# Patient Record
Sex: MALE | Race: WHITE | Employment: OTHER | URBAN - METROPOLITAN AREA
[De-identification: names, ages, dates, MRNs, and addresses within clinical notes are randomized per-mention and may not be internally consistent; named-entity substitution may affect disease eponyms.]

---

## 2017-02-06 ENCOUNTER — ANTICOAGULATION THERAPY VISIT (OUTPATIENT)
Dept: NURSING | Facility: CLINIC | Age: 82
End: 2017-02-06
Payer: COMMERCIAL

## 2017-02-06 DIAGNOSIS — Z79.01 LONG-TERM (CURRENT) USE OF ANTICOAGULANTS: Primary | ICD-10-CM

## 2017-02-06 DIAGNOSIS — I48.91 ATRIAL FIBRILLATION, UNSPECIFIED TYPE (H): ICD-10-CM

## 2017-02-06 LAB — INR POINT OF CARE: 2 (ref 0.86–1.14)

## 2017-02-06 PROCEDURE — 85610 PROTHROMBIN TIME: CPT | Mod: QW

## 2017-02-06 PROCEDURE — 99207 ZZC NO CHARGE NURSE ONLY: CPT

## 2017-02-06 PROCEDURE — 36416 COLLJ CAPILLARY BLOOD SPEC: CPT

## 2017-02-06 NOTE — PROGRESS NOTES
ANTICOAGULATION FOLLOW-UP CLINIC VISIT    Patient Name:  Linwood Chi  Date:  2/6/2017  Contact Type:  Face to Face    SUBJECTIVE:     Patient Findings     Positives No Problem Findings           OBJECTIVE    INR PROTIME   Date Value Ref Range Status   02/06/2017 2.0* 0.86 - 1.14 Final       ASSESSMENT / PLAN  INR assessment THER    Recheck INR In: 6 WEEKS    INR Location Clinic      Anticoagulation Summary as of 2/6/2017     INR goal 2.0-3.0   Selected INR 2.0 (2/6/2017)   Maintenance plan 5 mg (5 mg x 1) on Mon; 2.5 mg (5 mg x 0.5) all other days   Full instructions 5 mg on Mon; 2.5 mg all other days   Weekly total 20 mg   No change documented Marin Iqbal RN   Plan last modified Nannette Muhammad RN (4/11/2016)   Next INR check 3/20/2017   Priority INR   Target end date Indefinite    Indications   Long-term (current) use of anticoagulants [Z79.01] [Z79.01]  Atrial fibrillation (HCC) [I48.91] [I48.91]         Anticoagulation Episode Summary     INR check location     Preferred lab     Send INR reminders to [facility]    Comments             See the Encounter Report to view Anticoagulation Flowsheet and Dosing Calendar (Go to Encounters tab in chart review, and find the Anticoagulation Therapy Visit)    RN reviewed patient's weekly warfarin dose.  Pt INR remains within therapeutic range.  Pt will continue with current dosing and monitoring as planned, based on physician directed care plan.    [facility]     Marin Iqbal RN

## 2017-02-06 NOTE — MR AVS SNAPSHOT
Linwood Chi   2/6/2017 10:45 AM   Anticoagulation Therapy Visit    Description:  86 year old male   Provider:  SILVA ANTI COAG   Department:   Nurse           INR as of 2/6/2017     Selected INR 2.0 (2/6/2017)      Anticoagulation Summary as of 2/6/2017     INR goal 2.0-3.0   Selected INR 2.0 (2/6/2017)   Full instructions 5 mg on Mon; 2.5 mg all other days   Next INR check 3/20/2017    Indications   Long-term (current) use of anticoagulants [Z79.01] [Z79.01]  Atrial fibrillation (HCC) [I48.91] [I48.91]         Your next Anticoagulation Clinic appointment(s)     Feb 06, 2017 10:45 AM   [facility] SILVA ANTI COAG   [facility]    [address] [address] [phone]            Mar 20, 2017 10:45 AM   Anticoagulation Visit with SILVA ANTI COAG   [facility] ([address] [address] [phone]              Contact Numbers     [facility]  Please call [phone] to cancel and/or reschedule your appointment   Please call [phone] with any problems or questions regarding your therapy.        February 2017 Details    Sun Mon Tue Wed Thu Fri Sat        1               2               3               4                 5               6      5 mg   See details      7      2.5 mg         8      2.5 mg         9      2.5 mg         10      2.5 mg         11      2.5 mg           12      2.5 mg         13      5 mg         14      2.5 mg         15      2.5 mg         16      2.5 mg         17      2.5 mg         18      2.5 mg           19      2.5 mg         20      5 mg         21      2.5 mg         22      2.5 mg         23      2.5 mg         24      2.5 mg         25      2.5 mg           26      2.5 mg         27      5 mg         28      2.5 mg              Date Details   02/06 This INR check               How to take your warfarin dose     To take:  2.5 mg Take  0.5 of a 5 mg tablet.    To take:  5 mg Take 1 of the 5 mg tablets.           March 2017 Details    Sun Mon Tue Wed Thu Fri Sat        1      2.5 mg         2      2.5 mg         3      2.5 mg         4      2.5 mg           5      2.5 mg         6      5 mg         7      2.5 mg         8      2.5 mg         9      2.5 mg         10      2.5 mg         11      2.5 mg           12      2.5 mg         13      5 mg         14      2.5 mg         15      2.5 mg         16      2.5 mg         17      2.5 mg         18      2.5 mg           19      2.5 mg         20            21               22               23               24               25                 26               27               28               29               30               31                 Date Details   No additional details    Date of next INR:  3/20/2017         How to take your warfarin dose     To take:  2.5 mg Take 0.5 of a 5 mg tablet.    To take:  5 mg Take 1 of the 5 mg tablets.

## 2017-03-14 DIAGNOSIS — I48.91 ATRIAL FIBRILLATION, UNSPECIFIED TYPE (H): ICD-10-CM

## 2017-03-14 DIAGNOSIS — K21.9 GASTROESOPHAGEAL REFLUX DISEASE WITHOUT ESOPHAGITIS: Primary | ICD-10-CM

## 2017-03-14 RX ORDER — WARFARIN SODIUM 5 MG/1
TABLET ORAL
Qty: 50 TABLET | Refills: 11 | Status: SHIPPED | OUTPATIENT
Start: 2017-03-14 | End: 2018-06-16

## 2017-03-14 NOTE — TELEPHONE ENCOUNTER
omeprazole (PRILOSEC) 20 MG capsule (Discontinued) 12/26/2016      Last Written Prescription Date: 3/22/2016  Last Fill Quantity: 90,  # refills: 3   Last Office Visit with [facility], [facility] or [facility] prescribing provider: 12/26/2016    warfarin (COUMADIN) 5 MG tablet    Last Written Prescription Date: 12/14/2016  Last Fill Qty: 50, # refills: 0  Last Office Visit with [facility], [facility] or [facility] prescribing provider: 12/26/2016       Date and Result of Last PT/INR:   Lab Results   Component Value Date    INR 2.0 02/06/2017    INR 2.4 12/26/2016    INR 2.8 07/14/2015    INR  06/08/2013     NO ORDERS IN SYSTEM.  DR.DANG GARCIA ON CALL PLACED ORDER AND RESULT GIVEN TO HIM   VERBALLY AT 11:20./[facility]/MM  2.70    INR 8.54 06/04/2013

## 2017-03-17 ENCOUNTER — TELEPHONE (OUTPATIENT)
Dept: NURSING | Facility: CLINIC | Age: 82
End: 2017-03-17

## 2017-03-17 DIAGNOSIS — Z79.01 LONG-TERM (CURRENT) USE OF ANTICOAGULANTS: Primary | ICD-10-CM

## 2017-03-17 DIAGNOSIS — I48.91 ATRIAL FIBRILLATION, UNSPECIFIED TYPE (H): ICD-10-CM

## 2017-03-22 ENCOUNTER — ANTICOAGULATION THERAPY VISIT (OUTPATIENT)
Dept: NURSING | Facility: CLINIC | Age: 82
End: 2017-03-22
Payer: COMMERCIAL

## 2017-03-22 DIAGNOSIS — Z79.01 LONG-TERM (CURRENT) USE OF ANTICOAGULANTS: ICD-10-CM

## 2017-03-22 LAB — INR POINT OF CARE: 2.8 (ref 0.86–1.14)

## 2017-03-22 PROCEDURE — 99207 ZZC NO CHARGE NURSE ONLY: CPT

## 2017-03-22 PROCEDURE — 36416 COLLJ CAPILLARY BLOOD SPEC: CPT

## 2017-03-22 PROCEDURE — 85610 PROTHROMBIN TIME: CPT | Mod: QW

## 2017-03-22 NOTE — PROGRESS NOTES
ANTICOAGULATION FOLLOW-UP CLINIC VISIT    Patient Name:  Linwood Chi  Date:  3/22/2017  Contact Type:  Face to Face    SUBJECTIVE:     Patient Findings     Positives No Problem Findings           OBJECTIVE    INR Protime   Date Value Ref Range Status   03/22/2017 2.8 (A) 0.86 - 1.14 Final       ASSESSMENT / PLAN  INR assessment THER    Recheck INR In: 6 WEEKS    INR Location Clinic      Anticoagulation Summary as of 3/22/2017     INR goal 2.0-3.0   Today's INR 2.8   Maintenance plan 5 mg (5 mg x 1) on Mon; 2.5 mg (5 mg x 0.5) all other days   Full instructions 5 mg on Mon; 2.5 mg all other days   Weekly total 20 mg   No change documented Layla Garcia, RN   Plan last modified Nannette Muhammad RN (4/11/2016)   Next INR check 5/3/2017   Priority INR   Target end date Indefinite    Indications   Long-term (current) use of anticoagulants [Z79.01] [Z79.01]  Atrial fibrillation (HCC) [I48.91] [I48.91]         Anticoagulation Episode Summary     INR check location     Preferred lab     Send INR reminders to [facility]    Comments             See the Encounter Report to view Anticoagulation Flowsheet and Dosing Calendar (Go to Encounters tab in chart review, and find the Anticoagulation Therapy Visit)    Dosage adjustment made based on physician directed care plan.    Layla Garcia, SAMIR

## 2017-04-17 ENCOUNTER — OFFICE VISIT (OUTPATIENT)
Dept: FAMILY MEDICINE | Facility: CLINIC | Age: 82
End: 2017-04-17
Payer: COMMERCIAL

## 2017-04-17 VITALS
DIASTOLIC BLOOD PRESSURE: 62 MMHG | WEIGHT: 190 LBS | OXYGEN SATURATION: 98 % | BODY MASS INDEX: 30.53 KG/M2 | SYSTOLIC BLOOD PRESSURE: 142 MMHG | HEIGHT: 66 IN | TEMPERATURE: 97.4 F | HEART RATE: 63 BPM

## 2017-04-17 DIAGNOSIS — K62.5 RECTAL BLEEDING: Primary | ICD-10-CM

## 2017-04-17 LAB
BASOPHILS # BLD AUTO: 0 10E9/L (ref 0–0.2)
BASOPHILS NFR BLD AUTO: 0.2 %
DIFFERENTIAL METHOD BLD: ABNORMAL
EOSINOPHIL # BLD AUTO: 0.2 10E9/L (ref 0–0.7)
EOSINOPHIL NFR BLD AUTO: 1.5 %
ERYTHROCYTE [DISTWIDTH] IN BLOOD BY AUTOMATED COUNT: 15.9 % (ref 10–15)
HCT VFR BLD AUTO: 42.6 % (ref 40–53)
HGB BLD-MCNC: 14.6 G/DL (ref 13.3–17.7)
LYMPHOCYTES # BLD AUTO: 2.7 10E9/L (ref 0.8–5.3)
LYMPHOCYTES NFR BLD AUTO: 22.3 %
MCH RBC QN AUTO: 31.2 PG (ref 26.5–33)
MCHC RBC AUTO-ENTMCNC: 34.3 G/DL (ref 31.5–36.5)
MCV RBC AUTO: 91 FL (ref 78–100)
MONOCYTES # BLD AUTO: 0.9 10E9/L (ref 0–1.3)
MONOCYTES NFR BLD AUTO: 7 %
NEUTROPHILS # BLD AUTO: 8.5 10E9/L (ref 1.6–8.3)
NEUTROPHILS NFR BLD AUTO: 69 %
PLATELET # BLD AUTO: 247 10E9/L (ref 150–450)
RBC # BLD AUTO: 4.68 10E12/L (ref 4.4–5.9)
WBC # BLD AUTO: 12.3 10E9/L (ref 4–11)

## 2017-04-17 PROCEDURE — 36415 COLL VENOUS BLD VENIPUNCTURE: CPT | Performed by: FAMILY MEDICINE

## 2017-04-17 PROCEDURE — 85025 COMPLETE CBC W/AUTO DIFF WBC: CPT | Performed by: FAMILY MEDICINE

## 2017-04-17 PROCEDURE — 99214 OFFICE O/P EST MOD 30 MIN: CPT | Performed by: FAMILY MEDICINE

## 2017-04-17 ASSESSMENT — PAIN SCALES - GENERAL: PAINLEVEL: MILD PAIN (3)

## 2017-04-17 NOTE — MR AVS SNAPSHOT
After Visit Summary   4/17/2017    Linwood Chi    MRN: [id number]           Patient Information     Date Of Birth          5/27/1930        Visit Information        Provider Department      4/17/2017 9:00 AM Bernie Amaro MD [facility]        Today's Diagnoses     Rectal bleeding    -  1      Care Instructions    [facility]    If you have any questions regarding to your visit please contact your care team:       Team Purple:   Clinic Hours Telephone Number   [address] Dr[address]   7am-7pm  Monday - Thursday   7am-5pm  Fridays  [phone]  (Appointment scheduling available 24/7)    Questions about your Visit?   Team Line:  [phone]   Urgent Care - [place] and [facility] - 11am-9pm Monday-Friday Saturday-Sunday- 9am-5pm   [place] - 5pm-9pm Monday-Friday Saturday-Sunday- 9am-5pm  [phone] - [place]   [phone] - [place]       What options do I have for visits at the clinic other than the traditional office visit?  To expand how we care for you, many of our providers are utilizing electronic visits (e-visits) and telephone visits, when medically appropriate, for interactions with their patients rather than a visit in the clinic.   We also offer nurse visits for many medical concerns. Just like any other service, we will bill your insurance company for this type of visit based on time spent on the phone with your provider. Not all insurance companies cover these visits. Please check with your medical insurance if this type of visit is covered. You will be responsible for any charges that are not paid by your insurance.      E-visits via [employer]:  generally incur a $35.00 fee.  Telephone visits:  Time spent on the phone: *charged based on time that is spent on the phone in increments of 10 minutes. Estimated cost:   5-10 mins $30.00   11-20 mins. $59.00   21-30 mins. $85.00      Use [employer] Rock[employer] (secure email communication and access to your chart) to send your primary care provider a message or make an appointment. Ask someone on your Team how to sign up for [employer].  For a Price Quote for your services, please call our Consumer Price Line at [phone].  As always, Thank you for trusting us with your health care needs!            Follow-ups after your visit        Additional Services     GASTROENTEROLOGY ADULT REF CONSULT ONLY       Preferred Location: [facility] [phone]   For acute rectal bleeding       Please be aware that coverage of these services is subject to the terms and limitations of your health insurance plan.  Call member services at your health plan with any benefit or coverage questions.  Any procedures must be performed at a [facility] facility OR coordinated by your clinic's referral office.    Please bring the following with you to your appointment:    (1) Any X-Rays, CTs or MRIs which have been performed.  Contact the facility where they were done to arrange for  prior to your scheduled appointment.    (2) List of current medications   (3) This referral request   (4) Any documents/labs given to you for this referral                  Your next 10 appointments already scheduled     May 03, 2017  9:45 AM CDT   Anticoagulation Visit with FZ ANTI COAELICEO   [facility] [place] ([facility])    [address]  [place] [address] [phone]              Who to contact     If you have questions or need follow up information about today's clinic visit or your schedule please contact [facility] directly at [phone].  Normal or non-critical lab and imaging results will be communicated to you by MyChart, letter or phone within 4 business days after the clinic has received the results. If you do not hear from us within 7 days, please contact the clinic through avocadostorehart or phone. If you have a critical or abnormal lab result, we  "will notify you by phone as soon as possible.  Submit refill requests through [employer] or call your pharmacy and they will forward the refill request to us. Please allow 3 business days for your refill to be completed.          Additional Information About Your Visit        Rabixohart Information     [employer] lets you send messages to your doctor, view your test results, renew your prescriptions, schedule appointments and more. To sign up, go to [email or web address] . Click on \"Log in\" on the left side of the screen, which will take you to the Welcome page. Then click on \"Sign up Now\" on the right side of the page.     You will be asked to enter the access code listed below, as well as some personal information. Please follow the directions to create your username and password.     Your access code is: [email or web address]  Expires: 2017  9:34 AM     Your access code will  in 90 days. If you need help or a new code, please call your [facility] clinic or [phone].        Care EveryWhere ID     This is your Care EveryWhere ID. This could be used by other organizations to access your [facility] medical records  [id number]        Your Vitals Were     Pulse Temperature Height Pulse Oximetry BMI (Body Mass Index)       63 97.4  F (36.3  C) (Oral) 5' 6\" (1.676 m) 98% 30.67 kg/m2        Blood Pressure from Last 3 Encounters:   17 142/62   16 138/80   16 123/72    Weight from Last 3 Encounters:   17 190 lb (86.2 kg)   16 189 lb 8 oz (86 kg)   16 189 lb (85.7 kg)              We Performed the Following     CBC with platelets differential     GASTROENTEROLOGY ADULT REF CONSULT ONLY        Primary Care Provider Office Phone # Fax #    Bernie Amaro [phone]       [address] [address]        Thank you!     Thank you for choosing [facility]  for your care. Our goal is always to provide you with " excellent care. Hearing back from our patients is one way we can continue to improve our services. Please take a few minutes to complete the written survey that you may receive in the mail after your visit with us. Thank you!             Your Updated Medication List - Protect others around you: Learn how to safely use, store and throw away your medicines at www.disposemymeds.org.          This list is accurate as of: 4/17/17  9:34 AM.  Always use your most recent med list.                   Brand Name Dispense Instructions for use    amLODIPine 5 MG tablet    NORVASC    90 tablet    Take 1 tablet (5 mg) by mouth daily       aspirin 81 MG tablet      Take 1 tablet by mouth daily.       atorvastatin 40 MG tablet    LIPITOR    90 tablet    Take 1 tablet (40 mg) by mouth daily       dorzolamide-timolol 2-0.5 % ophthalmic solution    COSOPT     Place 1 drop Into the left eye 2 times daily       latanoprost 0.005 % ophthalmic solution    XALATAN     Place 1 drop Into the left eye daily       lisinopril 20 MG tablet    PRINIVIL/ZESTRIL    90 tablet    Take 1 tablet (20 mg) by mouth daily       metFORMIN 500 MG tablet    GLUCOPHAGE    180 tablet    TAKE 1 TABLET BY MOUTH TWICE DAILY WITH MEALS       metoprolol 25 MG tablet    LOPRESSOR    90 tablet    Take one a day       omeprazole 20 MG CR capsule    priLOSEC    90 capsule    TAKE 1 CAPSULE BY MOUTH EVERY DAY 30 TO 60 MINUTES BEFORE A MEAL       order for DME     1 each    Equipment being ordered: Mild compression hose       warfarin 5 MG tablet    COUMADIN    50 tablet    TAKE ONE-HALF TABLET BY MOUTH ON DAILY EXCEPT TAKE 1 TABLET DAILY ON MONDAYS

## 2017-04-17 NOTE — NURSING NOTE
"Chief Complaint   Patient presents with     Rectal Problem     blood in stool       Initial /62  Pulse 63  Temp 97.4  F (36.3  C) (Oral)  Ht 5' 6\" (1.676 m)  Wt 190 lb (86.2 kg)  SpO2 98%  BMI 30.67 kg/m2 Estimated body mass index is 30.67 kg/(m^2) as calculated from the following:    Height as of this encounter: 5' 6\" (1.676 m).    Weight as of this encounter: 190 lb (86.2 kg).  Medication Reconciliation: complete   Malissa Li CMA    "

## 2017-04-17 NOTE — LETTER
[address] [place] [address]    April 17, 2017    Linwood Chi  [address] [address]          Dear Donnie Palumbo is a copy of your results.   Your lab tests are looking good. The colonoscopy will give more answers as to why you are having rectal bleeding.    Results for orders placed or performed in visit on 04/17/17   CBC with platelets differential   Result Value Ref Range    WBC 12.3 (H) 4.0 - 11.0 10e9/L    RBC Count 4.68 4.4 - 5.9 10e12/L    Hemoglobin 14.6 13.3 - 17.7 g/dL    Hematocrit 42.6 40.0 - 53.0 %    MCV 91 78 - 100 fl    MCH 31.2 26.5 - 33.0 pg    MCHC 34.3 31.5 - 36.5 g/dL    RDW 15.9 (H) 10.0 - 15.0 %    Platelet Count 247 150 - 450 10e9/L    Diff Method Automated Method     % Neutrophils 69.0 %    % Lymphocytes 22.3 %    % Monocytes 7.0 %    % Eosinophils 1.5 %    % Basophils 0.2 %    Absolute Neutrophil 8.5 (H) 1.6 - 8.3 10e9/L    Absolute Lymphocytes 2.7 0.8 - 5.3 10e9/L    Absolute Monocytes 0.9 0.0 - 1.3 10e9/L    Absolute Eosinophils 0.2 0.0 - 0.7 10e9/L    Absolute Basophils 0.0 0.0 - 0.2 10e9/L       If you have any questions or concerns, please call myself or my nurse at [phone].      Sincerely,        Bernie Amaro MD   /

## 2017-04-17 NOTE — PATIENT INSTRUCTIONS
[facility]    If you have any questions regarding to your visit please contact your care team:       Team Purple:   Clinic Hours Telephone Number   [address] Dr[address]   7am-7pm  Monday - Thursday   7am-5pm  Fridays  [phone]  (Appointment scheduling available 24/7)    Questions about your Visit?   Team Line:  [phone]   Urgent Care - [place] and [facility] - 11am-9pm Monday-Friday Saturday-Sunday- 9am-5pm   [place] - 5pm-9pm Monday-Friday Saturday-Sunday- 9am-5pm  [phone] - [facility]  [phone] - [place]       What options do I have for visits at the clinic other than the traditional office visit?  To expand how we care for you, many of our providers are utilizing electronic visits (e-visits) and telephone visits, when medically appropriate, for interactions with their patients rather than a visit in the clinic.   We also offer nurse visits for many medical concerns. Just like any other service, we will bill your insurance company for this type of visit based on time spent on the phone with your provider. Not all insurance companies cover these visits. Please check with your medical insurance if this type of visit is covered. You will be responsible for any charges that are not paid by your insurance.      E-visits via [employer]:  generally incur a $35.00 fee.  Telephone visits:  Time spent on the phone: *charged based on time that is spent on the phone in increments of 10 minutes. Estimated cost:   5-10 mins $30.00   11-20 mins. $59.00   21-30 mins. $85.00     Use echoechot (secure email communication and access to your chart) to send your primary care provider a message or make an appointment. Ask someone on your Team how to sign up for [employer].  For a Price Quote for your services, please call our Consumer Price Line at [phone].  As always, Thank you for trusting us with your health care needs!

## 2017-04-17 NOTE — PROGRESS NOTES
SUBJECTIVE:                                                    Linwood Chi is a 86 year old male who presents to clinic today for the following health issues:      Blood in stool      Duration: 14 hours    Description (location/character/radiation): blood in stool 6 times yesterday, patient states he passed gas and blood came out.    Intensity:  moderate    Accompanying signs and symptoms: abdominal pain    History (similar episodes/previous evaluation): Patient states he had a tear in his colon in the past and needed a blood transfusion (he states 5 pints of blood). Patient is now on warfarin    Precipitating or alleviating factors: Patient didn't take his warfarin and aspirin today    Therapies tried and outcome: None            Problem list and histories reviewed & adjusted, as indicated.  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     DM (diabetes mellitus), type 2 with renal complications (H)     A-fib (H)     Osteoarthritis of left knee     Onychomycosis     Long-term (current) use of anticoagulants [Z79.01]     Atrial fibrillation (HCC) [I48.91]     Type 2 diabetes mellitus with diabetic nephropathy (H)     Benign essential hypertension     Gastroesophageal reflux disease without esophagitis     Past Surgical History:   Procedure Laterality Date     BYPASS GRAFT ARTERY CORONARY  2004    3 vessel 2004     OPEN REDUCTION INTERNAL FIXATION PATELLA         Social History   Substance Use Topics     Smoking status: Former Smoker     Years: 50.00     Types: Cigarettes     Quit date: 3/4/1992     Smokeless tobacco: Former User      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     Family History   Problem Relation Age of Onset     Asthma Father      DIABETES Paternal Grandmother      CEREBROVASCULAR DISEASE Brother      Hypertension Brother      DIABETES Sister      Psychotic Disorder Sister          Current Outpatient Prescriptions   Medication Sig Dispense Refill     omeprazole (PRILOSEC) 20 MG CR capsule TAKE 1 CAPSULE BY MOUTH EVERY DAY 30 TO 60 MINUTES BEFORE A MEAL 90 capsule 3     warfarin (COUMADIN) 5 MG tablet TAKE ONE-HALF TABLET BY MOUTH ON DAILY EXCEPT TAKE 1 TABLET DAILY ON MONDAYS 50 tablet 11     metoprolol (LOPRESSOR) 25 MG tablet Take one a day 90 tablet 4     atorvastatin (LIPITOR) 40 MG tablet Take 1 tablet (40 mg) by mouth daily 90 tablet 4     lisinopril (PRINIVIL/ZESTRIL) 20 MG tablet Take 1 tablet (20 mg) by mouth daily 90 tablet 4     amLODIPine (NORVASC) 5 MG tablet Take 1 tablet (5 mg) by mouth daily 90 tablet 4     metFORMIN (GLUCOPHAGE) 500 MG tablet TAKE 1 TABLET BY MOUTH TWICE DAILY WITH MEALS 180 tablet 4     dorzolamide-timolol (COSOPT) 2-0.5 % ophthalmic solution Place 1 drop Into the left eye 2 times daily  6     latanoprost (XALATAN) 0.005 % ophthalmic solution Place 1 drop Into the left eye daily  3     order for DME Equipment being ordered: Mild compression hose 1 each 0     aspirin 81 MG tablet Take 1 tablet by mouth daily.  3     Allergies   Allergen Reactions     Crestor [Rosuvastatin] Cramps     Pcn [Bicillin C-R,]      Swelling      BP Readings from Last 3 Encounters:   04/17/17 142/62   12/26/16 138/80   07/22/16 123/72    Wt Readings from Last 3 Encounters:   04/17/17 190 lb (86.2 kg)   12/26/16 189 lb 8 oz (86 kg)   07/22/16 189 lb (85.7 kg)                  Labs reviewed in EPIC    Reviewed and updated as needed this visit by clinical staff       Reviewed and updated as needed this visit by Provider         ROS:  This 86 year old male is here today because he was invited to a neighbor's house  "yesterday after [employer] for [employer] dinner. Once he got home, he had about 6-7 loose bowel movements and they were bloody at the end. He just had another bloody bowel movement prior to this appointment. Has last colonoscopy was 6 years ago, but his prep was not very good. He will need another colonoscopy. His INR was 2.8 on 3/22/17.  All other review of systems are negative  Personal, family, and social history reviewed with patient and revised.         OBJECTIVE:                                                    /62  Pulse 63  Temp 97.4  F (36.3  C) (Oral)  Ht 5' 6\" (1.676 m)  Wt 190 lb (86.2 kg)  SpO2 98%  BMI 30.67 kg/m2  Body mass index is 30.67 kg/(m^2).  GENERAL: healthy, alert and no distress, but he is anxious and appears a little pale   NECK: no adenopathy, no asymmetry, masses, or scars and thyroid normal to palpation  RESP: lungs clear to auscultation - no rales, rhonchi or wheezes  CV: regular rate and rhythm, normal S1 S2, no S3 or S4, no murmur, click or rub, no peripheral edema and peripheral pulses strong  ABDOMEN: soft, nontender, no hepatosplenomegaly, no masses and bowel sounds normal  No shortness of breath with walking.   Rectal digital exam: positive for gross blood. He has no rectal fissures and no hemorrhoids.   Diagnostic Test Results:  No results found for this or any previous visit (from the past 24 hour(s)).     ASSESSMENT/PLAN:                                                             1. Rectal bleeding  As above, he needs colonoscopy ASAP. I will send him to [facility]. He will need to hold his coumadin for the procedure.   - CBC with platelets differential  - GASTROENTEROLOGY ADULT REF PROCEDURE ONLY    Return to clinic if no improvement     LORENZO DONAHUE MD  [facility]  "

## 2017-05-04 ENCOUNTER — TELEPHONE (OUTPATIENT)
Dept: FAMILY MEDICINE | Facility: CLINIC | Age: 82
End: 2017-05-04

## 2017-05-04 NOTE — TELEPHONE ENCOUNTER
Message left for patient to call the INR Clinic # at [phone] to reschedule his appointment. He was a no show for appointment on 5/3.    Layla Garcia RN - BC

## 2017-05-08 ENCOUNTER — ANTICOAGULATION THERAPY VISIT (OUTPATIENT)
Dept: NURSING | Facility: CLINIC | Age: 82
End: 2017-05-08
Payer: COMMERCIAL

## 2017-05-08 DIAGNOSIS — Z79.01 LONG-TERM (CURRENT) USE OF ANTICOAGULANTS: ICD-10-CM

## 2017-05-08 LAB — INR POINT OF CARE: 2.6 (ref 0.86–1.14)

## 2017-05-08 PROCEDURE — 36416 COLLJ CAPILLARY BLOOD SPEC: CPT

## 2017-05-08 PROCEDURE — 85610 PROTHROMBIN TIME: CPT | Mod: QW

## 2017-05-08 PROCEDURE — 99207 ZZC NO CHARGE NURSE ONLY: CPT

## 2017-05-08 NOTE — MR AVS SNAPSHOT
Linwood Chi   5/8/2017 1:00 PM   Anticoagulation Therapy Visit    Description:  86 year old male   Provider:  MIRANDA ANTI COAG   Department:  [facility]           INR as of 5/8/2017     Today's INR 2.6      Anticoagulation Summary as of 5/8/2017     INR goal 2.0-3.0   Today's INR 2.6   Full instructions 5 mg on Mon; 2.5 mg all other days   Next INR check     Indications   Long-term (current) use of anticoagulants [Z79.01] [Z79.01]  Atrial fibrillation (HCC) [I48.91] [I48.91]         Your next Anticoagulation Clinic appointment(s)     Jun 19, 2017  1:30 PM CDT   Anticoagulation Visit with MIRANDA ANTI COAG   [facility] ([facility]    [address] [place] [address] [phone]              Contact Numbers     [facility]  Please call [phone] to cancel and/or reschedule your appointment   Please call [phone] with any problems or questions regarding your therapy.        May 2017 Details    Sun Mon Tue Wed Thu Fri Sat      1               2               3               4               5               6                 7               8      5 mg   See details      9      2.5 mg         10      2.5 mg         11      2.5 mg         12      2.5 mg         13      2.5 mg           14      2.5 mg         15      5 mg         16      2.5 mg         17      2.5 mg         18      2.5 mg         19      2.5 mg         20      2.5 mg           21      2.5 mg         22      5 mg         23      2.5 mg         24      2.5 mg         25      2.5 mg         26      2.5 mg         27      2.5 mg           28      2.5 mg         29      5 mg         30      2.5 mg         31      2.5 mg             Date Details   05/08 This INR check      Date of next INR: No date specified         How to take your warfarin dose     To take:  2.5 mg Take 0.5 of a 5 mg tablet.    To take:  5 mg Take 1 of the 5 mg tablets.

## 2017-05-08 NOTE — PROGRESS NOTES
ANTICOAGULATION FOLLOW-UP CLINIC VISIT    Patient Name:  Linwood Chi  Date:  5/8/2017  Contact Type:  Face to Face    SUBJECTIVE:     Patient Findings     Positives No Problem Findings           OBJECTIVE    INR Protime   Date Value Ref Range Status   05/08/2017 2.6 (A) 0.86 - 1.14 Final       ASSESSMENT / PLAN  INR assessment THER    Recheck INR In: 6 WEEKS    INR Location Clinic      Anticoagulation Summary as of 5/8/2017     INR goal 2.0-3.0   Today's INR 2.6   Maintenance plan 5 mg (5 mg x 1) on Mon; 2.5 mg (5 mg x 0.5) all other days   Full instructions 5 mg on Mon; 2.5 mg all other days   Weekly total 20 mg   No change documented Mely Luz RN   Plan last modified Nannette Muhammad RN (4/11/2016)   Next INR check    Priority INR   Target end date Indefinite    Indications   Long-term (current) use of anticoagulants [Z79.01] [Z79.01]  Atrial fibrillation (HCC) [I48.91] [I48.91]         Anticoagulation Episode Summary     INR check location     Preferred lab     Send INR reminders to [facility]    Comments             See the Encounter Report to view Anticoagulation Flowsheet and Dosing Calendar (Go to Encounters tab in chart review, and find the Anticoagulation Therapy Visit)    Dosage adjustment made based on physician directed care plan. Reviewed both bleeding and clotting signs and symptoms with patient this visit. Pt. has no further questions or concerns.  Will call with any changes to diet, medications, or missed doses at INR line [phone].      Mely Luz RN

## 2017-06-19 ENCOUNTER — ANTICOAGULATION THERAPY VISIT (OUTPATIENT)
Dept: NURSING | Facility: CLINIC | Age: 82
End: 2017-06-19
Payer: COMMERCIAL

## 2017-06-19 DIAGNOSIS — Z79.01 LONG-TERM (CURRENT) USE OF ANTICOAGULANTS: ICD-10-CM

## 2017-06-19 LAB — INR POINT OF CARE: 2.7 (ref 0.86–1.14)

## 2017-06-19 PROCEDURE — 85610 PROTHROMBIN TIME: CPT | Mod: QW

## 2017-06-19 PROCEDURE — 99207 ZZC NO CHARGE NURSE ONLY: CPT

## 2017-06-19 PROCEDURE — 36416 COLLJ CAPILLARY BLOOD SPEC: CPT

## 2017-06-19 NOTE — MR AVS SNAPSHOT
Linwood Chi   6/19/2017 1:30 PM   Anticoagulation Therapy Visit    Description:  87 year old male   Provider:  MIRANDA ANTI COAG   Department:  [facility]           INR as of 6/19/2017     Today's INR 2.7      Anticoagulation Summary as of 6/19/2017     INR goal 2.0-3.0   Today's INR 2.7   Full instructions 5 mg on Mon; 2.5 mg all other days   Next INR check 7/31/2017    Indications   Long-term (current) use of anticoagulants [Z79.01] [Z79.01]  Atrial fibrillation (HCC) [I48.91] [I48.91]         Your next Anticoagulation Clinic appointment(s)     Jun 19, 2017  1:30 PM CDT   Anticoagulation Visit with MIRANDA ANTI COAG   [facility] ([facility]    [address] [address] [phone]            Jul 31, 2017  1:30 PM CDT   Anticoagulation Visit with MIRANDA ANTI COAG   [facility] ([facility]    [address] [address] [phone]              Contact Numbers     [facility]  Please call [phone] to cancel and/or reschedule your appointment   Please call [phone] with any problems or questions regarding your therapy.        June 2017 Details    Sun Mon Tue Wed Thu Fri Sat         1               2               3                 4               5               6               7               8               9               10                 11               12               13               14               15               16               17                 18               19      5 mg   See details      20      2.5 mg         21      2.5 mg         22      2.5 mg         23      2.5 mg         24      2.5 mg           25      2.5 mg         26      5 mg         27      2.5 mg         28      2.5 mg         29      2.5 mg         30      2.5 mg           Date Details   06/19 This INR check               How to take your warfarin dose     To take:  2.5 mg Take 0.5 of a 5 mg tablet.    To take:  5 mg Take 1  of the 5 mg tablets.           July 2017 Details    Sun Mon Tue Wed Thu Fri Sat           1      2.5 mg           2      2.5 mg         3      5 mg         4      2.5 mg         5      2.5 mg         6      2.5 mg         7      2.5 mg         8      2.5 mg           9      2.5 mg         10      5 mg         11      2.5 mg         12      2.5 mg         13      2.5 mg         14      2.5 mg         15      2.5 mg           16      2.5 mg         17      5 mg         18      2.5 mg         19      2.5 mg         20      2.5 mg         21      2.5 mg         22      2.5 mg           23      2.5 mg         24      5 mg         25      2.5 mg         26      2.5 mg         27      2.5 mg         28      2.5 mg         29      2.5 mg           30      2.5 mg         31                  Date Details   No additional details    Date of next INR:  7/31/2017         How to take your warfarin dose     To take:  2.5 mg Take 0.5 of a 5 mg tablet.    To take:  5 mg Take 1 of the 5 mg tablets.

## 2017-06-19 NOTE — PROGRESS NOTES
ANTICOAGULATION FOLLOW-UP CLINIC VISIT    Patient Name:  Linwood Chi  Date:  6/19/2017  Contact Type:  Face to Face    SUBJECTIVE:     Patient Findings     Positives No Problem Findings           OBJECTIVE    INR Protime   Date Value Ref Range Status   06/19/2017 2.7 (A) 0.86 - 1.14 Final       ASSESSMENT / PLAN  INR assessment THER    Recheck INR In: 6 WEEKS    INR Location Clinic      Anticoagulation Summary as of 6/19/2017     INR goal 2.0-3.0   Today's INR 2.7   Maintenance plan 5 mg (5 mg x 1) on Mon; 2.5 mg (5 mg x 0.5) all other days   Full instructions 5 mg on Mon; 2.5 mg all other days   Weekly total 20 mg   No change documented Mely Luz RN   Plan last modified Nannette Muhammad RN (4/11/2016)   Next INR check 7/31/2017   Priority INR   Target end date Indefinite    Indications   Long-term (current) use of anticoagulants [Z79.01] [Z79.01]  Atrial fibrillation (HCC) [I48.91] [I48.91]         Anticoagulation Episode Summary     INR check location     Preferred lab     Send INR reminders to [facility]    Comments             See the Encounter Report to view Anticoagulation Flowsheet and Dosing Calendar (Go to Encounters tab in chart review, and find the Anticoagulation Therapy Visit)    Dosage adjustment made based on physician directed care plan. Reviewed both bleeding and clotting signs and symptoms with patient this visit. Pt. has no further questions or concerns.  Will call with any changes to diet, medications, or missed doses at INR line [phone].      Mely Luz RN

## 2017-07-31 ENCOUNTER — ANTICOAGULATION THERAPY VISIT (OUTPATIENT)
Dept: NURSING | Facility: CLINIC | Age: 82
End: 2017-07-31
Payer: COMMERCIAL

## 2017-07-31 ENCOUNTER — TELEPHONE (OUTPATIENT)
Dept: FAMILY MEDICINE | Facility: CLINIC | Age: 82
End: 2017-07-31

## 2017-07-31 DIAGNOSIS — Z79.01 LONG-TERM (CURRENT) USE OF ANTICOAGULANTS: Primary | ICD-10-CM

## 2017-07-31 DIAGNOSIS — I48.91 ATRIAL FIBRILLATION (H): ICD-10-CM

## 2017-07-31 DIAGNOSIS — Z79.01 LONG-TERM (CURRENT) USE OF ANTICOAGULANTS: ICD-10-CM

## 2017-07-31 LAB — INR POINT OF CARE: 2.2 (ref 0.86–1.14)

## 2017-07-31 PROCEDURE — 36416 COLLJ CAPILLARY BLOOD SPEC: CPT

## 2017-07-31 PROCEDURE — 99207 ZZC NO CHARGE NURSE ONLY: CPT

## 2017-07-31 PROCEDURE — 85610 PROTHROMBIN TIME: CPT | Mod: QW

## 2017-07-31 NOTE — MR AVS SNAPSHOT
Linwood Chi   7/31/2017 1:30 PM   Anticoagulation Therapy Visit    Description:  87 year old male   Provider:  MIRANDA ANTI COAG   Department:  [facility]           INR as of 7/31/2017     Today's INR 2.2      Anticoagulation Summary as of 7/31/2017     INR goal 2.0-3.0   Today's INR 2.2   Full instructions 5 mg on Mon; 2.5 mg all other days   Next INR check 9/11/2017    Indications   Long-term (current) use of anticoagulants [Z79.01] [Z79.01]  Atrial fibrillation (HCC) [I48.91] [I48.91]         Your next Anticoagulation Clinic appointment(s)     Jul 31, 2017  1:30 PM CDT   Anticoagulation Visit with MIRANDA ANTI COAG   [facility] ([facility]    [address] [address] [phone]            Sep 11, 2017  1:30 PM CDT   Anticoagulation Visit with MIRANDA ANTI COAG   [facility] ([place] [id number] [address] [phone]              Contact Numbers     [facility]  Please call [phone] to cancel and/or reschedule your appointment   Please call [phone] with any problems or questions regarding your therapy.        July 2017 Details    Sun Mon Tue Wed Thu Fri Sat           1                 2               3               4               5               6               7               8                 9               10               11               12               13               14               15                 16               17               18               19               20               21               22                 23               24               25               26               27               28               29                 30               31      5 mg   See details            Date Details   07/31 This INR check               How to take your warfarin dose     To take:  5 mg Take 1 of the 5 mg tablets.           August 2017 Details    Sun Mon Tue Wed Thu Fri Sat        1      2.5 mg         2      2.5 mg         3      2.5 mg         4      2.5 mg         5      2.5 mg           6      2.5 mg         7      5 mg         8      2.5 mg         9      2.5 mg         10      2.5 mg         11      2.5 mg         12      2.5 mg           13      2.5 mg         14      5 mg         15      2.5 mg         16      2.5 mg         17      2.5 mg         18      2.5 mg         19      2.5 mg           20      2.5 mg         21      5 mg         22      2.5 mg         23      2.5 mg         24      2.5 mg         25      2.5 mg         26      2.5 mg           27      2.5 mg         28      5 mg         29      2.5 mg         30      2.5 mg         31      2.5 mg            Date Details   No additional details            How to take your warfarin dose     To take:  2.5 mg Take 0.5 of a 5 mg tablet.    To take:  5 mg Take 1 of the 5 mg tablets.           September 2017 Details    Sun Mon Tue Wed Thu Fri Sat          1      2.5 mg         2      2.5 mg           3      2.5 mg         4      5 mg         5      2.5 mg         6      2.5 mg         7      2.5 mg         8      2.5 mg         9      2.5 mg           10      2.5 mg         11            12               13               14               15               16                 17               18               19               20               21               22               23                 24               25               26               27               28               29               30                Date Details   No additional details    Date of next INR:  9/11/2017         How to take your warfarin dose     To take:  2.5 mg Take 0.5 of a 5 mg tablet.    To take:  5 mg Take 1 of the 5 mg tablets.

## 2017-07-31 NOTE — PROGRESS NOTES
ANTICOAGULATION FOLLOW-UP CLINIC VISIT    Patient Name:  Linwood Chi  Date:  7/31/2017  Contact Type:  Face to Face    SUBJECTIVE:     Patient Findings     Positives No Problem Findings           OBJECTIVE    INR Protime   Date Value Ref Range Status   07/31/2017 2.2 (A) 0.86 - 1.14 Final       ASSESSMENT / PLAN  No question data found.  Anticoagulation Summary as of 7/31/2017     INR goal 2.0-3.0   Today's INR 2.2   Maintenance plan 5 mg (5 mg x 1) on Mon; 2.5 mg (5 mg x 0.5) all other days   Full instructions 5 mg on Mon; 2.5 mg all other days   Weekly total 20 mg   No change documented Layla Garcia, RN   Plan last modified Nannette Muhammad RN (4/11/2016)   Next INR check 9/11/2017   Priority INR   Target end date Indefinite    Indications   Long-term (current) use of anticoagulants [Z79.01] [Z79.01]  Atrial fibrillation (HCC) [I48.91] [I48.91]         Anticoagulation Episode Summary     INR check location     Preferred lab     Send INR reminders to [facility]    Comments             See the Encounter Report to view Anticoagulation Flowsheet and Dosing Calendar (Go to Encounters tab in chart review, and find the Anticoagulation Therapy Visit)    Dosage adjustment made based on physician directed care plan.    Layla Garcia, SAMIR

## 2017-09-11 ENCOUNTER — ANTICOAGULATION THERAPY VISIT (OUTPATIENT)
Dept: NURSING | Facility: CLINIC | Age: 82
End: 2017-09-11
Payer: COMMERCIAL

## 2017-09-11 DIAGNOSIS — I48.91 ATRIAL FIBRILLATION (H): ICD-10-CM

## 2017-09-11 DIAGNOSIS — Z79.01 LONG-TERM (CURRENT) USE OF ANTICOAGULANTS: ICD-10-CM

## 2017-09-11 LAB — INR POINT OF CARE: 2.4 (ref 0.86–1.14)

## 2017-09-11 PROCEDURE — 36416 COLLJ CAPILLARY BLOOD SPEC: CPT

## 2017-09-11 PROCEDURE — 85610 PROTHROMBIN TIME: CPT | Mod: QW

## 2017-09-11 PROCEDURE — 99207 ZZC NO CHARGE NURSE ONLY: CPT

## 2017-09-11 NOTE — PROGRESS NOTES
ANTICOAGULATION FOLLOW-UP CLINIC VISIT    Patient Name:  Linwood Chi  Date:  9/11/2017  Contact Type:  Face to Face    SUBJECTIVE:     Patient Findings     Positives No Problem Findings           OBJECTIVE    INR Protime   Date Value Ref Range Status   09/11/2017 2.4 (A) 0.86 - 1.14 Final       ASSESSMENT / PLAN  INR assessment THER    Recheck INR In: 6 WEEKS    INR Location Clinic      Anticoagulation Summary as of 9/11/2017     INR goal 2.0-3.0   Today's INR 2.4   Maintenance plan 5 mg (5 mg x 1) on Mon; 2.5 mg (5 mg x 0.5) all other days   Full instructions 5 mg on Mon; 2.5 mg all other days   Weekly total 20 mg   No change documented Davie Lyles RN   Plan last modified Nannette Muhammad RN (4/11/2016)   Next INR check 10/23/2017   Priority INR   Target end date Indefinite    Indications   Long-term (current) use of anticoagulants [Z79.01] [Z79.01]  Atrial fibrillation (HCC) [I48.91] [I48.91]         Anticoagulation Episode Summary     INR check location     Preferred lab     Send INR reminders to [facility]    Comments             See the Encounter Report to view Anticoagulation Flowsheet and Dosing Calendar (Go to Encounters tab in chart review, and find the Anticoagulation Therapy Visit)    Dosage adjustment made based on physician directed care plan.    Davie Lyles, SAMIR

## 2017-09-11 NOTE — MR AVS SNAPSHOT
Linwood Chi   9/11/2017 1:30 PM   Anticoagulation Therapy Visit    Description:  87 year old male   Provider:  MIRANDA ANTI COAG   Department:  [facility]           INR as of 9/11/2017     Today's INR 2.4      Anticoagulation Summary as of 9/11/2017     INR goal 2.0-3.0   Today's INR 2.4   Full instructions 5 mg on Mon; 2.5 mg all other days   Next INR check 10/23/2017    Indications   Long-term (current) use of anticoagulants [Z79.01] [Z79.01]  Atrial fibrillation (HCC) [I48.91] [I48.91]         Your next Anticoagulation Clinic appointment(s)     Sep 11, 2017  1:30 PM CDT   Anticoagulation Visit with MIRANDA ANTI COAG   [facility] ([facility]    [address] [address] [phone]            Oct 23, 2017  1:30 PM CDT   Anticoagulation Visit with MIRANDA ANTI COAG   [facility] ([place] [id number] [address] [facility] [id number] [phone]              Contact Numbers     [facility]  Please call [phone] to cancel and/or reschedule your appointment   Please call [phone] with any problems or questions regarding your therapy.        September 2017 Details    Sun Mon Tue Wed Thu Fri Sat          1               2                 3               4               5               6               7               8               9                 10               11      5 mg   See details      12      2.5 mg         13      2.5 mg         14      2.5 mg         15      2.5 mg         16      2.5 mg           17      2.5 mg         18      5 mg         19      2.5 mg         20      2.5 mg         21      2.5 mg         22      2.5 mg         23      2.5 mg           24      2.5 mg         25      5 mg         26      2.5 mg         27      2.5 mg         28      2.5 mg         29      2.5 mg         30      2.5 mg          Date Details   09/11 This INR check               How to take your warfarin dose     To take:  2.5 mg  Take 0.5 of a 5 mg tablet.    To take:  5 mg Take 1 of the 5 mg tablets.           October 2017 Details    Sun Mon Tue Wed Thu Fri Sat     1      2.5 mg         2      5 mg         3      2.5 mg         4      2.5 mg         5      2.5 mg         6      2.5 mg         7      2.5 mg           8      2.5 mg         9      5 mg         10      2.5 mg         11      2.5 mg         12      2.5 mg         13      2.5 mg         14      2.5 mg           15      2.5 mg         16      5 mg         17      2.5 mg         18      2.5 mg         19      2.5 mg         20      2.5 mg         21      2.5 mg           22      2.5 mg         23            24               25               26               27               28                 29               30               31                    Date Details   No additional details    Date of next INR:  10/23/2017         How to take your warfarin dose     To take:  2.5 mg Take 0.5 of a 5 mg tablet.    To take:  5 mg Take 1 of the 5 mg tablets.

## 2017-10-23 ENCOUNTER — ANTICOAGULATION THERAPY VISIT (OUTPATIENT)
Dept: NURSING | Facility: CLINIC | Age: 82
End: 2017-10-23
Payer: COMMERCIAL

## 2017-10-23 DIAGNOSIS — Z79.01 LONG-TERM (CURRENT) USE OF ANTICOAGULANTS: ICD-10-CM

## 2017-10-23 DIAGNOSIS — I48.91 ATRIAL FIBRILLATION (H): ICD-10-CM

## 2017-10-23 LAB — INR POINT OF CARE: 2.4 (ref 0.86–1.14)

## 2017-10-23 PROCEDURE — 36416 COLLJ CAPILLARY BLOOD SPEC: CPT

## 2017-10-23 PROCEDURE — 85610 PROTHROMBIN TIME: CPT | Mod: QW

## 2017-10-23 PROCEDURE — 99207 ZZC NO CHARGE NURSE ONLY: CPT

## 2017-10-23 NOTE — PROGRESS NOTES
ANTICOAGULATION FOLLOW-UP CLINIC VISIT    Patient Name:  Linwood Chi  Date:  10/23/2017  Contact Type:  Face to Face    SUBJECTIVE:     Patient Findings     Positives No Problem Findings           OBJECTIVE    INR Protime   Date Value Ref Range Status   10/23/2017 2.4 (A) 0.86 - 1.14 Final       ASSESSMENT / PLAN  No question data found.  Anticoagulation Summary as of 10/23/2017     INR goal 2.0-3.0   Today's INR 2.4   Maintenance plan 5 mg (5 mg x 1) on Mon; 2.5 mg (5 mg x 0.5) all other days   Full instructions 5 mg on Mon; 2.5 mg all other days   Weekly total 20 mg   No change documented Layla Garcia, RN   Plan last modified Nannette Muhammad RN (4/11/2016)   Next INR check 12/4/2017   Priority INR   Target end date Indefinite    Indications   Long-term (current) use of anticoagulants [Z79.01] [Z79.01]  Atrial fibrillation (HCC) [I48.91] [I48.91]         Anticoagulation Episode Summary     INR check location     Preferred lab     Send INR reminders to [facility]    Comments             See the Encounter Report to view Anticoagulation Flowsheet and Dosing Calendar (Go to Encounters tab in chart review, and find the Anticoagulation Therapy Visit)    Dosage adjustment made based on physician directed care plan.    Layla Garcia, SAMIR

## 2017-10-23 NOTE — MR AVS SNAPSHOT
Linwood Chi   10/23/2017 1:30 PM   Anticoagulation Therapy Visit    Description:  87 year old male   Provider:  MIRANDA ANTI COAG   Department:  [facility]           INR as of 10/23/2017     Today's INR 2.4      Anticoagulation Summary as of 10/23/2017     INR goal 2.0-3.0   Today's INR 2.4   Full instructions 5 mg on Mon; 2.5 mg all other days   Next INR check 12/4/2017    Indications   Long-term (current) use of anticoagulants [Z79.01] [Z79.01]  Atrial fibrillation (HCC) [I48.91] [I48.91]         Your next Anticoagulation Clinic appointment(s)     Oct 23, 2017  1:30 PM CDT   Anticoagulation Visit with MIRANDA ANTI COAG   [facility] ([facility]    [address] [address] [phone]            Dec 04, 2017  1:00 PM CST   Anticoagulation Visit with MIRANDA ANTI COAG   [facility] ([facility]    [address] [address] [phone]              Contact Numbers     [facility]  Please call [phone] to cancel and/or reschedule your appointment   Please call [phone] with any problems or questions regarding your therapy.        October 2017 Details    Sun Mon Tue Wed Thu Fri Sat     1               2               3               4               5               6               7                 8               9               10               11               12               13               14                 15               16               17               18               19               20               21                 22               23      5 mg   See details      24      2.5 mg         25      2.5 mg         26      2.5 mg         27      2.5 mg         28      2.5 mg           29      2.5 mg         30      5 mg         31      2.5 mg              Date Details   10/23 This INR check               How to take your warfarin dose     To take:  2.5 mg Take 0.5 of a 5 mg tablet.    To take:  5 mg Take  1 of the 5 mg tablets.           November 2017 Details    Sun Mon Tue Wed Thu Fri Sat        1      2.5 mg         2      2.5 mg         3      2.5 mg         4      2.5 mg           5      2.5 mg         6      5 mg         7      2.5 mg         8      2.5 mg         9      2.5 mg         10      2.5 mg         11      2.5 mg           12      2.5 mg         13      5 mg         14      2.5 mg         15      2.5 mg         16      2.5 mg         17      2.5 mg         18      2.5 mg           19      2.5 mg         20      5 mg         21      2.5 mg         22      2.5 mg         23      2.5 mg         24      2.5 mg         25      2.5 mg           26      2.5 mg         27      5 mg         28      2.5 mg         29      2.5 mg         30      2.5 mg            Date Details   No additional details            How to take your warfarin dose     To take:  2.5 mg Take 0.5 of a 5 mg tablet.    To take:  5 mg Take 1 of the 5 mg tablets.           December 2017 Details    Sun Mon Tue Wed Thu Fri Sat          1      2.5 mg         2      2.5 mg           3      2.5 mg         4            5               6               7               8               9                 10               11               12               13               14               15               16                 17               18               19               20               21               22               23                 24               25               26               27               28               29               30                 31                      Date Details   No additional details    Date of next INR:  12/4/2017         How to take your warfarin dose     To take:  2.5 mg Take 0.5 of a 5 mg tablet.    To take:  5 mg Take 1 of the 5 mg tablets.

## 2017-12-04 ENCOUNTER — ANTICOAGULATION THERAPY VISIT (OUTPATIENT)
Dept: NURSING | Facility: CLINIC | Age: 82
End: 2017-12-04
Payer: COMMERCIAL

## 2017-12-04 DIAGNOSIS — I48.91 ATRIAL FIBRILLATION (H): ICD-10-CM

## 2017-12-04 DIAGNOSIS — Z79.01 LONG-TERM (CURRENT) USE OF ANTICOAGULANTS: ICD-10-CM

## 2017-12-04 LAB — INR POINT OF CARE: 2.6 (ref 0.86–1.14)

## 2017-12-04 PROCEDURE — 99207 ZZC NO CHARGE NURSE ONLY: CPT

## 2017-12-04 PROCEDURE — 36416 COLLJ CAPILLARY BLOOD SPEC: CPT

## 2017-12-04 PROCEDURE — 85610 PROTHROMBIN TIME: CPT | Mod: QW

## 2017-12-04 NOTE — PROGRESS NOTES
ANTICOAGULATION FOLLOW-UP CLINIC VISIT    Patient Name:  Linwood Chi  Date:  12/4/2017  Contact Type:  Face to Face    SUBJECTIVE:     Patient Findings     Positives No Problem Findings           OBJECTIVE    INR Protime   Date Value Ref Range Status   12/04/2017 2.6 (A) 0.86 - 1.14 Final       ASSESSMENT / PLAN  No question data found.  Anticoagulation Summary as of 12/4/2017     INR goal 2.0-3.0   Today's INR 2.6   Maintenance plan 5 mg (5 mg x 1) on Mon; 2.5 mg (5 mg x 0.5) all other days   Full instructions 5 mg on Mon; 2.5 mg all other days   Weekly total 20 mg   No change documented Layla Garcia, RN   Plan last modified Nannette Muhammad RN (4/11/2016)   Next INR check 1/15/2018   Priority INR   Target end date Indefinite    Indications   Long-term (current) use of anticoagulants [Z79.01] [Z79.01]  Atrial fibrillation (HCC) [I48.91] [I48.91]         Anticoagulation Episode Summary     INR check location     Preferred lab     Send INR reminders to [facility] CL[facility]    Comments             See the Encounter Report to view Anticoagulation Flowsheet and Dosing Calendar (Go to Encounters tab in chart review, and find the Anticoagulation Therapy Visit)    Dosage adjustment made based on physician directed care plan.    Layla Garcia, SAMIR

## 2017-12-04 NOTE — MR AVS SNAPSHOT
Linwood Chi   12/4/2017 1:00 PM   Anticoagulation Therapy Visit    Description:  87 year old male   Provider:  MIRANDA ANTI COAG   Department:  [facility]           INR as of 12/4/2017     Today's INR 2.6      Anticoagulation Summary as of 12/4/2017     INR goal 2.0-3.0   Today's INR 2.6   Full instructions 5 mg on Mon; 2.5 mg all other days   Next INR check 1/15/2018    Indications   Long-term (current) use of anticoagulants [Z79.01] [Z79.01]  Atrial fibrillation (HCC) [I48.91] [I48.91]         Your next Anticoagulation Clinic appointment(s)     Dec 04, 2017  1:00 PM CST   Anticoagulation Visit with  ANTI COAG   [facility] ([facility]    [address] [place] [phone]            Zohaib 15, 2018  1:30 PM CST   Anticoagulation Visit with MIRANDA ANTI COAG   [facility] ([facility]    [address] [facility] [phone]              Contact Numbers     [facility]  Please call [phone] to cancel and/or reschedule your appointment   Please call [phone] with any problems or questions regarding your therapy.        December 2017 Details    Sun Mon Tue Wed Thu Fri Sat          1               2                 3               4      5 mg   See details      5      2.5 mg         6      2.5 mg         7      2.5 mg         8      2.5 mg         9      2.5 mg           10      2.5 mg         11      5 mg         12      2.5 mg         13      2.5 mg         14      2.5 mg         15      2.5 mg         16      2.5 mg           17      2.5 mg         18      5 mg         19      2.5 mg         20      2.5 mg         21      2.5 mg         22      2.5 mg         23      2.5 mg           24      2.5 mg         25      5 mg         26      2.5 mg         27      2.5 mg         28      2.5 mg         29      2.5 mg         30      2.5 mg           31      2.5 mg                Date Details   12/04 This INR  check               How to take your warfarin dose     To take:  2.5 mg Take 0.5 of a 5 mg tablet.    To take:  5 mg Take 1 of the 5 mg tablets.           January 2018 Details    Sun Mon Tue Wed Thu Fri Sat      1      5 mg         2      2.5 mg         3      2.5 mg         4      2.5 mg         5      2.5 mg         6      2.5 mg           7      2.5 mg         8      5 mg         9      2.5 mg         10      2.5 mg         11      2.5 mg         12      2.5 mg         13      2.5 mg           14      2.5 mg         15            16               17               18               19               20                 21               22               23               24               25               26               27                 28               29               30               31                   Date Details   No additional details    Date of next INR:  1/15/2018         How to take your warfarin dose     To take:  2.5 mg Take 0.5 of a 5 mg tablet.    To take:  5 mg Take 1 of the 5 mg tablets.

## 2018-01-16 ENCOUNTER — ANTICOAGULATION THERAPY VISIT (OUTPATIENT)
Dept: NURSING | Facility: CLINIC | Age: 83
End: 2018-01-16
Payer: COMMERCIAL

## 2018-01-16 DIAGNOSIS — Z79.01 LONG-TERM (CURRENT) USE OF ANTICOAGULANTS: ICD-10-CM

## 2018-01-16 DIAGNOSIS — I48.91 ATRIAL FIBRILLATION (H): ICD-10-CM

## 2018-01-16 LAB — INR POINT OF CARE: 2.9 (ref 0.86–1.14)

## 2018-01-16 PROCEDURE — 36416 COLLJ CAPILLARY BLOOD SPEC: CPT

## 2018-01-16 PROCEDURE — 85610 PROTHROMBIN TIME: CPT | Mod: QW

## 2018-01-16 PROCEDURE — 99207 ZZC NO CHARGE NURSE ONLY: CPT

## 2018-01-16 NOTE — MR AVS SNAPSHOT
Linwood Chi   1/16/2018 2:00 PM   Anticoagulation Therapy Visit    Description:  87 year old male   Provider:  MIRANDA ANTI COAG   Department:  [facility]           INR as of 1/16/2018     Today's INR 2.9      Anticoagulation Summary as of 1/16/2018     INR goal 2.0-3.0   Today's INR 2.9   Full instructions 5 mg on Mon; 2.5 mg all other days   Next INR check 2/12/2018    Indications   Long-term (current) use of anticoagulants [Z79.01] [Z79.01]  Atrial fibrillation (HCC) [I48.91] [I48.91]         Your next Anticoagulation Clinic appointment(s)     Feb 12, 2018  2:00 PM CST   Anticoagulation Visit with MIRANDA ANTI COAG   [facility] ([facility]    [address] [place] [id number] [phone]              Contact Numbers     [facility]  Please call [phone] to cancel and/or reschedule your appointment   Please call [phone] with any problems or questions regarding your therapy.        January 2018 Details    Sun Mon Tue Wed Thu Fri Sat      1               2               3               4               5               6                 7               8               9               10               11               12               13                 14               15               16      2.5 mg   See details      17      2.5 mg         18      2.5 mg         19      2.5 mg         20      2.5 mg           21      2.5 mg         22      5 mg         23      2.5 mg         24      2.5 mg         25      2.5 mg         26      2.5 mg         27      2.5 mg           28      2.5 mg         29      5 mg         30      2.5 mg         31      2.5 mg             Date Details   01/16 This INR check               How to take your warfarin dose     To take:  2.5 mg Take 0.5 of a 5 mg tablet.    To take:  5 mg Take 1 of the 5 mg tablets.           February 2018 Details    Sun Mon Tue Wed Thu Fri Sat         1      2.5 mg         2      2.5 mg         3      2.5 mg            4      2.5 mg         5      5 mg         6      2.5 mg         7      2.5 mg         8      2.5 mg         9      2.5 mg         10      2.5 mg           11      2.5 mg         12            13               14               15               16               17                 18               19               20               21               22               23               24                 25               26               27               28                   Date Details   No additional details    Date of next INR:  2/12/2018         How to take your warfarin dose     To take:  2.5 mg Take 0.5 of a 5 mg tablet.    To take:  5 mg Take 1 of the 5 mg tablets.

## 2018-01-16 NOTE — PROGRESS NOTES
ANTICOAGULATION FOLLOW-UP CLINIC VISIT    Patient Name:  Linwood Chi  Date:  1/16/2018  Contact Type:  Face to Face    SUBJECTIVE:     Patient Findings     Positives Other complaints (Right shoulder pain), No Problem Findings           OBJECTIVE    INR Protime   Date Value Ref Range Status   01/16/2018 2.9 (A) 0.86 - 1.14 Final       ASSESSMENT / PLAN  INR assessment THER    Recheck INR In: 5 WEEKS    INR Location Clinic      Anticoagulation Summary as of 1/16/2018     INR goal 2.0-3.0   Today's INR 2.9   Maintenance plan 5 mg (5 mg x 1) on Mon; 2.5 mg (5 mg x 0.5) all other days   Full instructions 5 mg on Mon; 2.5 mg all other days   Weekly total 20 mg   No change documented Brianne Gibbs RN   Plan last modified Nannette Muhammad RN (4/11/2016)   Next INR check 2/12/2018   Priority INR   Target end date Indefinite    Indications   Long-term (current) use of anticoagulants [Z79.01] [Z79.01]  Atrial fibrillation (HCC) [I48.91] [I48.91]         Anticoagulation Episode Summary     INR check location     Preferred lab     Send INR reminders to [facility] CLINIC    Comments             See the Encounter Report to view Anticoagulation Flowsheet and Dosing Calendar (Go to Encounters tab in chart review, and find the Anticoagulation Therapy Visit)    Dosage adjustment made based on physician directed care plan.    Brianne Gibbs RN

## 2018-01-22 DIAGNOSIS — E78.5 HYPERLIPIDEMIA WITH TARGET LDL LESS THAN 100: ICD-10-CM

## 2018-01-22 DIAGNOSIS — I10 BENIGN ESSENTIAL HYPERTENSION: ICD-10-CM

## 2018-01-22 RX ORDER — ATORVASTATIN CALCIUM 40 MG/1
TABLET, FILM COATED ORAL
Qty: 90 TABLET | Refills: 0 | Status: SHIPPED | OUTPATIENT
Start: 2018-01-22 | End: 2018-02-12

## 2018-01-22 NOTE — TELEPHONE ENCOUNTER
Pending Prescriptions:                       Disp   Refills    atorvastatin (LIPITOR) 40 MG tablet [Phar*90 tab*0            Sig: TAKE 1 TABLET(40 MG) BY MOUTH DAILY    Routing refill request to provider for review/approval because:  Labs not current:  LDL Lab order pending     Bailey Zee RN

## 2018-01-22 NOTE — TELEPHONE ENCOUNTER
Appointment scheduled 2/12/18  Signed Prescriptions:                        Disp   Refills    atorvastatin (LIPITOR) 40 MG tablet        90 tab*0        Sig: TAKE 1 TABLET(40 MG) BY MOUTH DAILY  Authorizing Provider: ALEX EMERY

## 2018-02-12 ENCOUNTER — OFFICE VISIT (OUTPATIENT)
Dept: FAMILY MEDICINE | Facility: CLINIC | Age: 83
End: 2018-02-12
Payer: COMMERCIAL

## 2018-02-12 ENCOUNTER — ANTICOAGULATION THERAPY VISIT (OUTPATIENT)
Dept: NURSING | Facility: CLINIC | Age: 83
End: 2018-02-12
Payer: COMMERCIAL

## 2018-02-12 VITALS
WEIGHT: 191 LBS | DIASTOLIC BLOOD PRESSURE: 70 MMHG | SYSTOLIC BLOOD PRESSURE: 132 MMHG | RESPIRATION RATE: 14 BRPM | HEIGHT: 66 IN | OXYGEN SATURATION: 97 % | TEMPERATURE: 97.8 F | HEART RATE: 67 BPM | BODY MASS INDEX: 30.7 KG/M2

## 2018-02-12 DIAGNOSIS — I10 BENIGN ESSENTIAL HYPERTENSION: ICD-10-CM

## 2018-02-12 DIAGNOSIS — Z79.01 LONG-TERM (CURRENT) USE OF ANTICOAGULANTS: ICD-10-CM

## 2018-02-12 DIAGNOSIS — K21.9 GASTROESOPHAGEAL REFLUX DISEASE WITHOUT ESOPHAGITIS: ICD-10-CM

## 2018-02-12 DIAGNOSIS — Z13.89 SCREENING FOR DIABETIC PERIPHERAL NEUROPATHY: ICD-10-CM

## 2018-02-12 DIAGNOSIS — E78.5 HYPERLIPIDEMIA WITH TARGET LDL LESS THAN 100: ICD-10-CM

## 2018-02-12 DIAGNOSIS — L30.1 DYSHIDROSIS: ICD-10-CM

## 2018-02-12 DIAGNOSIS — N18.30 CKD (CHRONIC KIDNEY DISEASE) STAGE 3, GFR 30-59 ML/MIN (H): ICD-10-CM

## 2018-02-12 DIAGNOSIS — Z91.81 AT RISK FOR FALLING: ICD-10-CM

## 2018-02-12 DIAGNOSIS — E11.21 TYPE 2 DIABETES MELLITUS WITH DIABETIC NEPHROPATHY, WITHOUT LONG-TERM CURRENT USE OF INSULIN (H): Primary | ICD-10-CM

## 2018-02-12 DIAGNOSIS — I48.91 ATRIAL FIBRILLATION (H): ICD-10-CM

## 2018-02-12 LAB
HBA1C MFR BLD: 6.3 % (ref 4.3–6)
INR POINT OF CARE: 2.5 (ref 0.86–1.14)

## 2018-02-12 PROCEDURE — 80053 COMPREHEN METABOLIC PANEL: CPT | Performed by: FAMILY MEDICINE

## 2018-02-12 PROCEDURE — 83036 HEMOGLOBIN GLYCOSYLATED A1C: CPT | Performed by: FAMILY MEDICINE

## 2018-02-12 PROCEDURE — 36416 COLLJ CAPILLARY BLOOD SPEC: CPT

## 2018-02-12 PROCEDURE — 99214 OFFICE O/P EST MOD 30 MIN: CPT | Performed by: FAMILY MEDICINE

## 2018-02-12 PROCEDURE — 85610 PROTHROMBIN TIME: CPT | Mod: QW

## 2018-02-12 PROCEDURE — 80061 LIPID PANEL: CPT | Performed by: FAMILY MEDICINE

## 2018-02-12 PROCEDURE — 99207 C FOOT EXAM  NO CHARGE: CPT | Performed by: FAMILY MEDICINE

## 2018-02-12 PROCEDURE — 99207 ZZC NO CHARGE NURSE ONLY: CPT

## 2018-02-12 RX ORDER — LISINOPRIL 20 MG/1
TABLET ORAL
Qty: 90 TABLET | Refills: 4 | Status: SHIPPED | OUTPATIENT
Start: 2018-02-12 | End: 2019-04-03

## 2018-02-12 RX ORDER — ATORVASTATIN CALCIUM 40 MG/1
TABLET, FILM COATED ORAL
Qty: 90 TABLET | Refills: 4 | Status: SHIPPED | OUTPATIENT
Start: 2018-02-12 | End: 2019-05-09

## 2018-02-12 RX ORDER — AMLODIPINE BESYLATE 5 MG/1
TABLET ORAL
Qty: 90 TABLET | Refills: 4 | Status: SHIPPED | OUTPATIENT
Start: 2018-02-12 | End: 2019-04-25

## 2018-02-12 RX ORDER — METOPROLOL TARTRATE 25 MG/1
TABLET, FILM COATED ORAL
Qty: 90 TABLET | Refills: 4 | Status: SHIPPED | OUTPATIENT
Start: 2018-02-12 | End: 2020-02-04

## 2018-02-12 NOTE — PROGRESS NOTES
ANTICOAGULATION FOLLOW-UP CLINIC VISIT    Patient Name:  Linwood Chi  Date:  2/12/2018  Contact Type:  Face to Face    SUBJECTIVE:     Patient Findings     Positives No Problem Findings           OBJECTIVE    INR Protime   Date Value Ref Range Status   02/12/2018 2.5 (A) 0.86 - 1.14 Final       ASSESSMENT / PLAN  INR assessment THER    Recheck INR In: 6 WEEKS    INR Location Clinic      Anticoagulation Summary as of 2/12/2018     INR goal 2.0-3.0   Today's INR 2.5   Maintenance plan 5 mg (5 mg x 1) on Mon; 2.5 mg (5 mg x 0.5) all other days   Full instructions 5 mg on Mon; 2.5 mg all other days   Weekly total 20 mg   No change documented Layla Garcia, RN   Plan last modified Nannette Muhammad RN (4/11/2016)   Next INR check 3/26/2018   Priority INR   Target end date Indefinite    Indications   Long-term (current) use of anticoagulants [Z79.01] [Z79.01]  Atrial fibrillation (HCC) [I48.91] [I48.91]         Anticoagulation Episode Summary     INR check location     Preferred lab     Send INR reminders to [facility] CL[facility]    Comments             See the Encounter Report to view Anticoagulation Flowsheet and Dosing Calendar (Go to Encounters tab in chart review, and find the Anticoagulation Therapy Visit)    Dosage adjustment made based on physician directed care plan.    Layla Garcia, SAMIR

## 2018-02-12 NOTE — MR AVS SNAPSHOT
After Visit Summary   2/12/2018    Linwood Chi    MRN: [id number]           Patient Information     Date Of Birth          5/27/1930        Visit Information        Provider Department      2/12/2018 2:15 PM Bernie Amaro MD [facility]        Today's Diagnoses     Type 2 diabetes mellitus with diabetic nephropathy, without long-term current use of insulin (H)    -  1    CKD (chronic kidney disease) stage 3, GFR 30-59 ml/min        Benign essential hypertension        Hyperlipidemia with target LDL less than 100        Gastroesophageal reflux disease without esophagitis        Screening for diabetic peripheral neuropathy        Dyshidrosis        At risk for falling          Care Instructions    [facility]    If you have any questions regarding to your visit please contact your care team:       Team Purple:   Clinic Hours Telephone Number   Dr. Bernie Salcido   7am-7pm  Monday - Thursday   7am-5pm  Fridays  [phone]  (Appointment scheduling available 24/7)    Questions about your Visit?   Team Line:  [phone]   Urgent Care - [place] [facility] - 11am-9pm Monday-Friday Saturday-Sunday- 9am-5pm   [place] - 5pm-9pm Monday-Friday Saturday-Sunday- 9am-5pm  [phone] - [place]   [phone] - [place]       What options do I have for visits at the clinic other than the traditional office visit?  To expand how we care for you, many of our providers are utilizing electronic visits (e-visits) and telephone visits, when medically appropriate, for interactions with their patients rather than a visit in the clinic.   We also offer nurse visits for many medical concerns. Just like any other service, we will bill your insurance company for this type of visit based on time spent on the phone with your provider. Not all insurance companies cover these visits. Please check with your  medical insurance if this type of visit is covered. You will be responsible for any charges that are not paid by your insurance.      E-visits via [employer]:  generally incur a $35.00 fee.  Telephone visits:  Time spent on the phone: *charged based on time that is spent on the phone in increments of 10 minutes. Estimated cost:   5-10 mins $30.00   11-20 mins. $59.00   21-30 mins. $85.00     Use [employer] (secure email communication and access to your chart) to send your primary care provider a message or make an appointment. Ask someone on your Team how to sign up for [employer].  For a Price Quote for your services, please call our [employer] Line at [phone].  As always, Thank you for trusting us with your health care needs!              Follow-ups after your visit        Your next 10 appointments already scheduled     Mar 26, 2018  2:00 PM CDT   Anticoagulation Visit with FZ ANTI COAG   [facility] [place] ([address]  [place] [address] [phone]              Who to contact     If you have questions or need follow up information about today's clinic visit or your schedule please contact [facility] directly at [phone].  Normal or non-critical lab and imaging results will be communicated to you by [employer], letter or phone within 4 business days after the clinic has received the results. If you do not hear from us within 7 days, please contact the clinic through [employer] or phone. If you have a critical or abnormal lab result, we will notify you by phone as soon as possible.  Submit refill requests through [employer] or call your pharmacy and they will forward the refill request to us. Please allow 3 business days for your refill to be completed.          Additional Information About Your Visit        [employer] Information     [employer] lets you send messages to your doctor, view your test results, renew your prescriptions, schedule appointments and  "more. To sign up, go to [email or web address] . Click on \"Log in\" on the left side of the screen, which will take you to the Welcome page. Then click on \"Sign up Now\" on the right side of the page.     You will be asked to enter the access code listed below, as well as some personal information. Please follow the directions to create your username and password.     Your access code is: [email or web address]  Expires: 2018  3:01 PM     Your access code will  in 90 days. If you need help or a new code, please call your [place] clinic or [phone].        Care EveryWhere ID     This is your Care EveryWhere ID. This could be used by other organizations to access your [place] medical records  [id number]        Your Vitals Were     Pulse Temperature Respirations Height Pulse Oximetry BMI (Body Mass Index)    67 97.8  F (36.6  C) 14 5' 6\" (1.676 m) 97% 30.83 kg/m2       Blood Pressure from Last 3 Encounters:   18 132/70   17 142/62   16 138/80    Weight from Last 3 Encounters:   18 191 lb (86.6 kg)   17 190 lb (86.2 kg)   16 189 lb 8 oz (86 kg)              We Performed the Following     Albumin Random Urine Quantitative with Creat Ratio     Comprehensive metabolic panel     FOOT EXAM  NO CHARGE [97594.114]     HEMOGLOBIN A1C     Lipid panel reflex to direct LDL Fasting          Today's Medication Changes          These changes are accurate as of 18  3:01 PM.  If you have any questions, ask your nurse or doctor.               These medicines have changed or have updated prescriptions.        Dose/Directions    amLODIPine 5 MG tablet   Commonly known as:  NORVASC   This may have changed:  See the new instructions.   Used for:  CKD (chronic kidney disease) stage 3, GFR 30-59 ml/min   Changed by:  Bernie Amaro MD        TAKE 1 TABLET(5 MG) BY MOUTH DAILY   Quantity:  90 tablet   Refills:  4       atorvastatin 40 MG tablet   Commonly known as:  LIPITOR   This may have " changed:  See the new instructions.   Used for:  Hyperlipidemia with target LDL less than 100, Benign essential hypertension   Changed by:  Bernie Amaro MD        TAKE 1 TABLET(40 MG) BY MOUTH DAILY   Quantity:  90 tablet   Refills:  4       lisinopril 20 MG tablet   Commonly known as:  PRINIVIL/ZESTRIL   This may have changed:  See the new instructions.   Used for:  CKD (chronic kidney disease) stage 3, GFR 30-59 ml/min   Changed by:  Bernie Amaro MD        TAKE 1 TABLET(20 MG) BY MOUTH DAILY   Quantity:  90 tablet   Refills:  4       metFORMIN 500 MG tablet   Commonly known as:  GLUCOPHAGE   This may have changed:  See the new instructions.   Used for:  Type 2 diabetes mellitus with diabetic nephropathy, without long-term current use of insulin (H)   Changed by:  Bernie Amaro MD        TAKE 1 TABLET BY MOUTH TWICE DAILY WITH MEALS   Quantity:  180 tablet   Refills:  4       omeprazole 20 MG CR capsule   Commonly known as:  priLOSEC   This may have changed:  See the new instructions.   Used for:  Gastroesophageal reflux disease without esophagitis   Changed by:  Bernie Amaro MD        TAKE 1 CAPSULE BY MOUTH EVERY DAY 30 TO 60 MINUTES BEFORE A MEAL   Quantity:  90 capsule   Refills:  4            Where to get your medicines      [facility] [address] [place] [address] & [facility]  [address] [facility] [address]    Hours:  24-hours Phone:  [phone]     amLODIPine 5 MG tablet    atorvastatin 40 MG tablet    lisinopril 20 MG tablet    metFORMIN 500 MG tablet    metoprolol tartrate 25 MG tablet    omeprazole 20 MG CR capsule                Primary Care Provider Office Phone # Fax #    Bernie Amaro [phone]       [facility] [address] [facility] [address]        Equal Access to Services     CHALINO SNOW AH: Renetta nagy  Sojewellali, waaxda luqadaha, qaybta kaalmada gokul, teresita chisholmreji angelica. So [facility] [phone].    ATENCIÓN: Si jessi wright, tiene a khanna disposición servicios gratuitos de asistencia lingüística. Trudy al [phone].    We comply with applicable federal civil rights laws and [place] laws. We do not discriminate on the basis of race, color, national origin, age, disability, sex, sexual orientation, or gender identity.            Thank you!     Thank you for choosing [facility]  for your care. Our goal is always to provide you with excellent care. Hearing back from our patients is one way we can continue to improve our services. Please take a few minutes to complete the written survey that you may receive in the mail after your visit with us. Thank you!             Your Updated Medication List - Protect others around you: Learn how to safely use, store and throw away your medicines at www.disposemymeds.org.          This list is accurate as of 2/12/18  3:01 PM.  Always use your most recent med list.                   Brand Name Dispense Instructions for use Diagnosis    amLODIPine 5 MG tablet    NORVASC    90 tablet    TAKE 1 TABLET(5 MG) BY MOUTH DAILY    CKD (chronic kidney disease) stage 3, GFR 30-59 ml/min       aspirin 81 MG tablet      Take 1 tablet by mouth daily.    Type 2 diabetes, HbA1c goal < 7% (H)       atorvastatin 40 MG tablet    LIPITOR    90 tablet    TAKE 1 TABLET(40 MG) BY MOUTH DAILY    Hyperlipidemia with target LDL less than 100, Benign essential hypertension       dorzolamide-timolol 2-0.5 % ophthalmic solution    COSOPT     Place 1 drop Into the left eye 2 times daily        latanoprost 0.005 % ophthalmic solution    XALATAN     Place 1 drop Into the left eye daily        lisinopril 20 MG tablet    PRINIVIL/ZESTRIL    90 tablet    TAKE 1 TABLET(20 MG) BY MOUTH DAILY    CKD (chronic kidney disease) stage 3, GFR 30-59 ml/min       metFORMIN 500 MG tablet     GLUCOPHAGE    180 tablet    TAKE 1 TABLET BY MOUTH TWICE DAILY WITH MEALS    Type 2 diabetes mellitus with diabetic nephropathy, without long-term current use of insulin (H)       metoprolol tartrate 25 MG tablet    LOPRESSOR    90 tablet    Take one a day    Benign essential hypertension       omeprazole 20 MG CR capsule    priLOSEC    90 capsule    TAKE 1 CAPSULE BY MOUTH EVERY DAY 30 TO 60 MINUTES BEFORE A MEAL    Gastroesophageal reflux disease without esophagitis       order for DME     1 each    Equipment being ordered: Mild compression hose    Pain and swelling of left lower leg       warfarin 5 MG tablet    COUMADIN    50 tablet    TAKE ONE-HALF TABLET BY MOUTH ON DAILY EXCEPT TAKE 1 TABLET DAILY ON MONDAYS    Atrial fibrillation, unspecified type (H)

## 2018-02-12 NOTE — MR AVS SNAPSHOT
Linwood Chi   2/12/2018 2:00 PM   Anticoagulation Therapy Visit    Description:  87 year old male   Provider:  MIRANDA ANTI COAG   Department:  [facility]           INR as of 2/12/2018     Today's INR 2.5      Anticoagulation Summary as of 2/12/2018     INR goal 2.0-3.0   Today's INR 2.5   Full instructions 5 mg on Mon; 2.5 mg all other days   Next INR check 3/26/2018    Indications   Long-term (current) use of anticoagulants [Z79.01] [Z79.01]  Atrial fibrillation (HCC) [I48.91] [I48.91]         Your next Anticoagulation Clinic appointment(s)     Feb 12, 2018  2:00 PM CST   Anticoagulation Visit with MIRANDA ANTI COAG   [facility] ([facility]    [address] [address] [phone]            Mar 26, 2018  2:00 PM CDT   Anticoagulation Visit with MIRANDA ANTI COAG   [facility] ([facility]    [address] [address] [phone]              Contact Numbers     [facility]  Please call [phone] to cancel and/or reschedule your appointment   Please call [phone] with any problems or questions regarding your therapy.        February 2018 Details    Sun Mon Tue Wed Thu Fri Sat         1               2               3                 4               5               6               7               8               9               10                 11               12      5 mg   See details      13      2.5 mg         14      2.5 mg         15      2.5 mg         16      2.5 mg         17      2.5 mg           18      2.5 mg         19      5 mg         20      2.5 mg         21      2.5 mg         22      2.5 mg         23      2.5 mg         24      2.5 mg           25      2.5 mg         26      5 mg         27      2.5 mg         28      2.5 mg             Date Details   02/12 This INR check               How to take your warfarin dose     To take:  2.5 mg Take 0.5 of a 5 mg tablet.    To take:  5 mg Take 1  of the 5 mg tablets.           March 2018 Details    Sun Mon Tue Wed Thu Fri Sat         1      2.5 mg         2      2.5 mg         3      2.5 mg           4      2.5 mg         5      5 mg         6      2.5 mg         7      2.5 mg         8      2.5 mg         9      2.5 mg         10      2.5 mg           11      2.5 mg         12      5 mg         13      2.5 mg         14      2.5 mg         15      2.5 mg         16      2.5 mg         17      2.5 mg           18      2.5 mg         19      5 mg         20      2.5 mg         21      2.5 mg         22      2.5 mg         23      2.5 mg         24      2.5 mg           25      2.5 mg         26            27               28               29               30               31                Date Details   No additional details    Date of next INR:  3/26/2018         How to take your warfarin dose     To take:  2.5 mg Take 0.5 of a 5 mg tablet.    To take:  5 mg Take 1 of the 5 mg tablets.

## 2018-02-12 NOTE — PATIENT INSTRUCTIONS
[facility]    If you have any questions regarding to your visit please contact your care team:       Team Purple:   Clinic Hours Telephone Number   Dr. Bernie Salcido   7am-7pm  Monday - Thursday   7am-5pm  Fridays  [phone]  (Appointment scheduling available 24/7)    Questions about your Visit?   Team Line:  [phone]   Urgent Care - [place] [facility] - 11am-9pm Monday-Friday Saturday-Sunday- 9am-5pm   [place] - 5pm-9pm Monday-Friday Saturday-Sunday- 9am-5pm  [phone] - [facility]  [phone] - [place]       What options do I have for visits at the clinic other than the traditional office visit?  To expand how we care for you, many of our providers are utilizing electronic visits (e-visits) and telephone visits, when medically appropriate, for interactions with their patients rather than a visit in the clinic.   We also offer nurse visits for many medical concerns. Just like any other service, we will bill your insurance company for this type of visit based on time spent on the phone with your provider. Not all insurance companies cover these visits. Please check with your medical insurance if this type of visit is covered. You will be responsible for any charges that are not paid by your insurance.      E-visits via [employer]:  generally incur a $35.00 fee.  Telephone visits:  Time spent on the phone: *charged based on time that is spent on the phone in increments of 10 minutes. Estimated cost:   5-10 mins $30.00   11-20 mins. $59.00   21-30 mins. $85.00     Use [employer] (secure email communication and access to your chart) to send your primary care provider a message or make an appointment. Ask someone on your Team how to sign up for [employer].  For a Price Quote for your services, please call our Consumer Price Line at [phone].  As always, Thank you for trusting us with your health care needs!

## 2018-02-13 LAB
ALBUMIN SERPL-MCNC: 3.8 G/DL (ref 3.4–5)
ALP SERPL-CCNC: 100 U/L (ref 40–150)
ALT SERPL W P-5'-P-CCNC: 30 U/L (ref 0–70)
ANION GAP SERPL CALCULATED.3IONS-SCNC: 8 MMOL/L (ref 3–14)
AST SERPL W P-5'-P-CCNC: 16 U/L (ref 0–45)
BILIRUB SERPL-MCNC: 0.6 MG/DL (ref 0.2–1.3)
BUN SERPL-MCNC: 18 MG/DL (ref 7–30)
CALCIUM SERPL-MCNC: 9.1 MG/DL (ref 8.5–10.1)
CHLORIDE SERPL-SCNC: 103 MMOL/L (ref 94–109)
CHOLEST SERPL-MCNC: 133 MG/DL
CO2 SERPL-SCNC: 25 MMOL/L (ref 20–32)
CREAT SERPL-MCNC: 1.1 MG/DL (ref 0.66–1.25)
GFR SERPL CREATININE-BSD FRML MDRD: 63 ML/MIN/1.7M2
GLUCOSE SERPL-MCNC: 97 MG/DL (ref 70–99)
HDLC SERPL-MCNC: 48 MG/DL
LDLC SERPL CALC-MCNC: 59 MG/DL
NONHDLC SERPL-MCNC: 85 MG/DL
POTASSIUM SERPL-SCNC: 4.9 MMOL/L (ref 3.4–5.3)
PROT SERPL-MCNC: 7.5 G/DL (ref 6.8–8.8)
SODIUM SERPL-SCNC: 136 MMOL/L (ref 133–144)
TRIGL SERPL-MCNC: 132 MG/DL

## 2018-03-26 ENCOUNTER — ANTICOAGULATION THERAPY VISIT (OUTPATIENT)
Dept: NURSING | Facility: CLINIC | Age: 83
End: 2018-03-26
Payer: COMMERCIAL

## 2018-03-26 DIAGNOSIS — I48.91 ATRIAL FIBRILLATION (H): ICD-10-CM

## 2018-03-26 DIAGNOSIS — Z79.01 LONG-TERM (CURRENT) USE OF ANTICOAGULANTS: ICD-10-CM

## 2018-03-26 LAB — INR POINT OF CARE: 2.9 (ref 0.86–1.14)

## 2018-03-26 PROCEDURE — 85610 PROTHROMBIN TIME: CPT | Mod: QW

## 2018-03-26 PROCEDURE — 99207 ZZC NO CHARGE NURSE ONLY: CPT

## 2018-03-26 PROCEDURE — 36416 COLLJ CAPILLARY BLOOD SPEC: CPT

## 2018-03-26 NOTE — MR AVS SNAPSHOT
Linwood Chi   3/26/2018 2:00 PM   Anticoagulation Therapy Visit    Description:  87 year old male   Provider:  MIRANDA ANTI COAG   Department:  [facility]           INR as of 3/26/2018     Today's INR 2.9      Anticoagulation Summary as of 3/26/2018     INR goal 2.0-3.0   Today's INR 2.9   Full instructions 5 mg on Mon; 2.5 mg all other days   Next INR check 5/7/2018    Indications   Long-term (current) use of anticoagulants [Z79.01] [Z79.01]  Atrial fibrillation (HCC) [I48.91] [I48.91]         Your next Anticoagulation Clinic appointment(s)     Mar 26, 2018  2:00 PM CDT   Anticoagulation Visit with MIRANDA ANTI COAG   [facility] ([facility]    [address] [address] [phone]            May 07, 2018  1:45 PM CDT   Anticoagulation Visit with MIRANDA ANTI COAG   [facility] ([facility]    [address] [address] [phone]              Contact Numbers     [facility]  Please call [phone] to cancel and/or reschedule your appointment   Please call [phone] with any problems or questions regarding your therapy.        March 2018 Details    Sun Mon Tue Wed Thu Fri Sat         1               2               3                 4               5               6               7               8               9               10                 11               12               13               14               15               16               17                 18               19               20               21               22               23               24                 25               26      5 mg   See details      27      2.5 mg         28      2.5 mg         29      2.5 mg         30      2.5 mg         31      2.5 mg          Date Details   03/26 This INR check               How to take your warfarin dose     To take:  2.5 mg Take 0.5 of a 5 mg tablet.    To take:  5 mg Take 1 of the 5 mg  tablets.           April 2018 Details    Sun Mon Tue Wed Thu Fri Sat     1      2.5 mg         2      5 mg         3      2.5 mg         4      2.5 mg         5      2.5 mg         6      2.5 mg         7      2.5 mg           8      2.5 mg         9      5 mg         10      2.5 mg         11      2.5 mg         12      2.5 mg         13      2.5 mg         14      2.5 mg           15      2.5 mg         16      5 mg         17      2.5 mg         18      2.5 mg         19      2.5 mg         20      2.5 mg         21      2.5 mg           22      2.5 mg         23      5 mg         24      2.5 mg         25      2.5 mg         26      2.5 mg         27      2.5 mg         28      2.5 mg           29      2.5 mg         30      5 mg               Date Details   No additional details            How to take your warfarin dose     To take:  2.5 mg Take 0.5 of a 5 mg tablet.    To take:  5 mg Take 1 of the 5 mg tablets.           May 2018 Details    Sun Mon Tue Wed Thu Fri Sat       1      2.5 mg         2      2.5 mg         3      2.5 mg         4      2.5 mg         5      2.5 mg           6      2.5 mg         7            8               9               10               11               12                 13               14               15               16               17               18               19                 20               21               22               23               24               25               26                 27               28               29               30               31                  Date Details   No additional details    Date of next INR:  5/7/2018         How to take your warfarin dose     To take:  2.5 mg Take 0.5 of a 5 mg tablet.    To take:  5 mg Take 1 of the 5 mg tablets.

## 2018-03-26 NOTE — PROGRESS NOTES
ANTICOAGULATION FOLLOW-UP CLINIC VISIT    Patient Name:  Linwood Chi  Date:  3/26/2018  Contact Type:  Face to Face    SUBJECTIVE:     Patient Findings     Positives No Problem Findings           OBJECTIVE    INR Protime   Date Value Ref Range Status   03/26/2018 2.9 (A) 0.86 - 1.14 Final       ASSESSMENT / PLAN  INR assessment THER    Recheck INR In: 6 WEEKS    INR Location Clinic      Anticoagulation Summary as of 3/26/2018     INR goal 2.0-3.0   Today's INR 2.9   Maintenance plan 5 mg (5 mg x 1) on Mon; 2.5 mg (5 mg x 0.5) all other days   Full instructions 5 mg on Mon; 2.5 mg all other days   Weekly total 20 mg   No change documented Layla Garcia, RN   Plan last modified Nannette Muhammad RN (4/11/2016)   Next INR check 5/7/2018   Priority INR   Target end date Indefinite    Indications   Long-term (current) use of anticoagulants [Z79.01] [Z79.01]  Atrial fibrillation (HCC) [I48.91] [I48.91]         Anticoagulation Episode Summary     INR check location     Preferred lab     Send INR reminders to [facility] CL[facility]    Comments             See the Encounter Report to view Anticoagulation Flowsheet and Dosing Calendar (Go to Encounters tab in chart review, and find the Anticoagulation Therapy Visit)    Dosage adjustment made based on physician directed care plan.    Layla Garcia, SAMIR

## 2018-05-07 ENCOUNTER — ANTICOAGULATION THERAPY VISIT (OUTPATIENT)
Dept: NURSING | Facility: CLINIC | Age: 83
End: 2018-05-07
Payer: COMMERCIAL

## 2018-05-07 DIAGNOSIS — I48.91 ATRIAL FIBRILLATION (H): ICD-10-CM

## 2018-05-07 DIAGNOSIS — Z79.01 LONG-TERM (CURRENT) USE OF ANTICOAGULANTS: ICD-10-CM

## 2018-05-07 LAB — INR POINT OF CARE: 2.8 (ref 0.86–1.14)

## 2018-05-07 PROCEDURE — 99207 ZZC NO CHARGE NURSE ONLY: CPT

## 2018-05-07 PROCEDURE — 36416 COLLJ CAPILLARY BLOOD SPEC: CPT

## 2018-05-07 PROCEDURE — 85610 PROTHROMBIN TIME: CPT | Mod: QW

## 2018-05-07 NOTE — MR AVS SNAPSHOT
Linwood Chi   5/7/2018 1:45 PM   Anticoagulation Therapy Visit    Description:  87 year old male   Provider:  MIRANDA ANTI COAG   Department:  [facility]           INR as of 5/7/2018     Today's INR 2.8      Anticoagulation Summary as of 5/7/2018     INR goal 2.0-3.0   Today's INR 2.8   Full instructions 5 mg on Mon; 2.5 mg all other days   Next INR check 6/18/2018    Indications   Long-term (current) use of anticoagulants [Z79.01] [Z79.01]  Atrial fibrillation (HCC) [I48.91] [I48.91]         Your next Anticoagulation Clinic appointment(s)     Jun 18, 2018  2:00 PM CDT   Anticoagulation Visit with MIRANDA ANTI COAG   [facility] ([address] [address] [phone]              Contact Numbers     [facility]  Please call [phone] to cancel and/or reschedule your appointment   Please call [phone] with any problems or questions regarding your therapy.        May 2018 Details    Sun Mon Tue Wed Thu Fri Sat       1               2               3               4               5                 6               7      5 mg   See details      8      2.5 mg         9      2.5 mg         10      2.5 mg         11      2.5 mg         12      2.5 mg           13      2.5 mg         14      5 mg         15      2.5 mg         16      2.5 mg         17      2.5 mg         18      2.5 mg         19      2.5 mg           20      2.5 mg         21      5 mg         22      2.5 mg         23      2.5 mg         24      2.5 mg         25      2.5 mg         26      2.5 mg           27      2.5 mg         28      5 mg         29      2.5 mg         30      2.5 mg         31      2.5 mg            Date Details   05/07 This INR check               How to take your warfarin dose     To take:  2.5 mg Take 0.5 of a 5 mg tablet.    To take:  5 mg Take 1 of the 5 mg tablets.           June 2018 Details    Sun Mon Tue Wed Thu Fri Sat          1      2.5 mg          2      2.5 mg           3      2.5 mg         4      5 mg         5      2.5 mg         6      2.5 mg         7      2.5 mg         8      2.5 mg         9      2.5 mg           10      2.5 mg         11      5 mg         12      2.5 mg         13      2.5 mg         14      2.5 mg         15      2.5 mg         16      2.5 mg           17      2.5 mg         18            19               20               21               22               23                 24               25               26               27               28               29               30                Date Details   No additional details    Date of next INR:  6/18/2018         How to take your warfarin dose     To take:  2.5 mg Take 0.5 of a 5 mg tablet.    To take:  5 mg Take 1 of the 5 mg tablets.

## 2018-05-07 NOTE — PROGRESS NOTES
SUBJECTIVE:   Linwood Chi is a 87 year old male who presents to clinic today for the following health issues:      Diabetes Follow-up      Patient is checking blood sugars: not at all    Diabetic concerns: None     Symptoms of hypoglycemia (low blood sugar): none     Paresthesias (numbness or burning in feet) or sores: No     Date of last diabetic eye exam: go every 6 months, have an appointment coming up on 05/29/2018    Hyperlipidemia Follow-Up      Rate your low fat/cholesterol diet?: good    Taking statin?  Yes, no muscle aches from statin    Other lipid medications/supplements?:  none    Hypertension Follow-up      Outpatient blood pressures are not being checked.    Low Salt Diet: watching salt level intake    BP Readings from Last 2 Encounters:   05/08/18 152/70   02/12/18 132/70     Hemoglobin A1C (%)   Date Value   02/12/2018 6.3 (H)   12/26/2016 6.3 (H)     LDL Cholesterol Calculated (mg/dL)   Date Value   02/12/2018 59   12/26/2016 76       Amount of exercise or physical activity: 3 days/week for an average of 30-45 minutes going to the [facility]    Problems taking medications regularly: No    Medication side effects: none    Diet: low salt and low fat/cholesterol             Problem list and histories reviewed & adjusted, as indicated.  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     DM (diabetes mellitus), type 2 with renal complications (H)     A-fib (H)     Osteoarthritis of left knee     Onychomycosis     Long-term (current) use of anticoagulants [Z79.01]     Atrial fibrillation (HCC) [I48.91]     Benign essential  hypertension     Gastroesophageal reflux disease without esophagitis     Type 2 diabetes mellitus with diabetic nephropathy, without long-term current use of insulin (H)     Past Surgical History:   Procedure Laterality Date     BYPASS GRAFT ARTERY CORONARY  2004    3 vessel 2004     OPEN REDUCTION INTERNAL FIXATION PATELLA         Social History   Substance Use Topics     Smoking status: Former Smoker     Years: 50.00     Types: Cigarettes     Quit date: 3/4/1992     Smokeless tobacco: Former User     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  3     atorvastatin (LIPITOR) 40 MG tablet TAKE 1 TABLET(40 MG) BY MOUTH DAILY 90 tablet 4     dorzolamide-timolol (COSOPT) 2-0.5 % ophthalmic solution Place 1 drop Into the left eye 2 times daily  6     latanoprost (XALATAN) 0.005 % ophthalmic solution Place 1 drop Into the left eye daily  3     lisinopril (PRINIVIL/ZESTRIL) 20 MG tablet TAKE 1 TABLET(20 MG) BY MOUTH DAILY 90 tablet 4     metFORMIN (GLUCOPHAGE) 500 MG tablet TAKE 1 TABLET BY MOUTH TWICE DAILY WITH MEALS 180 tablet 4     metoprolol tartrate (LOPRESSOR) 25 MG tablet Take one a day 90 tablet 4     omeprazole (PRILOSEC) 20 MG CR capsule TAKE 1 CAPSULE BY MOUTH EVERY DAY 30 TO 60 MINUTES BEFORE A MEAL 90 capsule 4     order for DME Equipment being ordered: Mild compression hose 1 each 0     warfarin (COUMADIN) 5 MG tablet TAKE ONE-HALF TABLET BY MOUTH ON DAILY EXCEPT TAKE 1 TABLET DAILY ON MONDAYS 50 tablet 11     Allergies   Allergen Reactions     Crestor [Rosuvastatin] Cramps     Pcn  "[Bicillin C-R,]      Swelling      Recent Labs   Lab Test  05/08/18   1528  02/12/18   1406  12/26/16   0929  07/22/16   1309  12/31/15   1356   08/31/15   1546   01/15/15   1418   A1C  6.3*  6.3*  6.3*  6.3*   --    < >  6.1*   --   6.4*   LDL   --   59  76   --   71   --    --    < >   --    HDL   --   48  49   --   41   --    --    < >   --    TRIG   --   132  135   --   116   --    --    < >   --    ALT   --   30  23   --    --    --   25   --    --    CR   --   1.10  1.51*  1.27*  1.17   --   1.17   --   1.31*   GFRESTIMATED   --   63  44*  54*  59*   --   59*   --   52*   GFRESTBLACK   --   76  53*  65  72   --   72   --   63   POTASSIUM   --   4.9  4.4  4.7  4.6   --   4.7   --   4.5   TSH   --    --    --   1.53   --    --    --    --   1.75    < > = values in this interval not displayed.      BP Readings from Last 3 Encounters:   05/08/18 120/60   02/12/18 132/70   04/17/17 142/62    Wt Readings from Last 3 Encounters:   05/08/18 188 lb 8 oz (85.5 kg)   02/12/18 191 lb (86.6 kg)   04/17/17 190 lb (86.2 kg)                  Labs reviewed in EPIC    Reviewed and updated as needed this visit by clinical staff       Reviewed and updated as needed this visit by Provider         ROS:  This 87 year old male is here today for diabetes check. He lives in his own house and still does his own yard work. Neighbor clear his snow for him. He is active in his [employer]od[employer]. Likes to sign, compose Confucianism songs, and do sketches of people. Has no new concerns. All other review of systems are negative  Personal, family, and social history reviewed with patient and revised.    CONSTITUTIONAL: NEGATIVE for fever, chills, change in weight  ENT/MOUTH: NEGATIVE for ear, mouth and throat problems  RESP: NEGATIVE for significant cough or SOB  CV: NEGATIVE for chest pain, palpitations or peripheral edema    OBJECTIVE:     /60  Pulse 55  Temp 96.3  F (35.7  C) (Oral)  Resp 24  Ht 5' 6\" (1.676 m)  Wt 188 lb 8 oz (85.5 kg)  SpO2 " 98%  BMI 30.42 kg/m2  Body mass index is 30.42 kg/(m^2).  GENERAL: healthy, alert and no distress  Appears younger than his age   NECK: no adenopathy, no asymmetry, masses, or scars and thyroid normal to palpation  RESP: lungs clear to auscultation - no rales, rhonchi or wheezes  CV: regular rate and rhythm, normal S1 S2, no S3 or S4, no murmur, click or rub, no peripheral edema and peripheral pulses strong  ABDOMEN: soft, nontender, no hepatosplenomegaly, no masses and bowel sounds normal  MS: no gross musculoskeletal defects noted, no edema  Well hydrated  Well nourished  Well groomed  Alert and oriented X 3  Good spirits  Brisk gait with no shortness of breath   His artistic sketching is very good. He is very talented.      Diagnostic Test Results:  Results for orders placed or performed in visit on 05/08/18 (from the past 24 hour(s))   Hemoglobin A1c   Result Value Ref Range    Hemoglobin A1C 6.3 (H) 0 - 5.6 %       ASSESSMENT/PLAN:              1. Type 2 diabetes mellitus with diabetic nephropathy, without long-term current use of insulin (H)  Good control   - Albumin Random Urine Quantitative with Creat Ratio  - Hemoglobin A1c  - Basic metabolic panel    2. Benign essential hypertension  Good control   - Basic metabolic panel  - CBC with platelets differential    3. CKD (chronic kidney disease) stage 3, GFR 30-59 ml/min  Good control   - Basic metabolic panel  - CBC with platelets differential    Return to clinic 6 months     LORENZO DONAHUE MD  [facility]

## 2018-05-07 NOTE — PROGRESS NOTES
ANTICOAGULATION FOLLOW-UP CLINIC VISIT    Patient Name:  Linwood Chi  Date:  5/7/2018  Contact Type:  Face to Face    SUBJECTIVE:     Patient Findings     Positives No Problem Findings           OBJECTIVE    INR Protime   Date Value Ref Range Status   05/07/2018 2.8 (A) 0.86 - 1.14 Final       ASSESSMENT / PLAN  INR assessment THER    Recheck INR In: 6 WEEKS    INR Location Clinic      Anticoagulation Summary as of 5/7/2018     INR goal 2.0-3.0   Today's INR 2.8   Maintenance plan 5 mg (5 mg x 1) on Mon; 2.5 mg (5 mg x 0.5) all other days   Full instructions 5 mg on Mon; 2.5 mg all other days   Weekly total 20 mg   No change documented Brianne Gibbs RN   Plan last modified Nannette Muhammad RN (4/11/2016)   Next INR check 6/18/2018   Priority INR   Target end date Indefinite    Indications   Long-term (current) use of anticoagulants [Z79.01] [Z79.01]  Atrial fibrillation (HCC) [I48.91] [I48.91]         Anticoagulation Episode Summary     INR check location     Preferred lab     Send INR reminders to [facility]    Comments             See the Encounter Report to view Anticoagulation Flowsheet and Dosing Calendar (Go to Encounters tab in chart review, and find the Anticoagulation Therapy Visit)    Dosage adjustment made based on physician directed care plan.    Brianne Gibbs RN

## 2018-05-08 ENCOUNTER — OFFICE VISIT (OUTPATIENT)
Dept: FAMILY MEDICINE | Facility: CLINIC | Age: 83
End: 2018-05-08
Payer: COMMERCIAL

## 2018-05-08 VITALS
HEART RATE: 55 BPM | RESPIRATION RATE: 24 BRPM | HEIGHT: 66 IN | OXYGEN SATURATION: 98 % | DIASTOLIC BLOOD PRESSURE: 60 MMHG | WEIGHT: 188.5 LBS | BODY MASS INDEX: 30.29 KG/M2 | TEMPERATURE: 96.3 F | SYSTOLIC BLOOD PRESSURE: 120 MMHG

## 2018-05-08 DIAGNOSIS — N18.30 CKD (CHRONIC KIDNEY DISEASE) STAGE 3, GFR 30-59 ML/MIN (H): ICD-10-CM

## 2018-05-08 DIAGNOSIS — I10 BENIGN ESSENTIAL HYPERTENSION: ICD-10-CM

## 2018-05-08 DIAGNOSIS — E11.21 TYPE 2 DIABETES MELLITUS WITH DIABETIC NEPHROPATHY, WITHOUT LONG-TERM CURRENT USE OF INSULIN (H): Primary | ICD-10-CM

## 2018-05-08 LAB
BASOPHILS # BLD AUTO: 0 10E9/L (ref 0–0.2)
BASOPHILS NFR BLD AUTO: 0.3 %
CREAT UR-MCNC: 54 MG/DL
DIFFERENTIAL METHOD BLD: ABNORMAL
EOSINOPHIL # BLD AUTO: 0.3 10E9/L (ref 0–0.7)
EOSINOPHIL NFR BLD AUTO: 3.4 %
ERYTHROCYTE [DISTWIDTH] IN BLOOD BY AUTOMATED COUNT: 16.9 % (ref 10–15)
HBA1C MFR BLD: 6.3 % (ref 0–5.6)
HCT VFR BLD AUTO: 40.9 % (ref 40–53)
HGB BLD-MCNC: 13.4 G/DL (ref 13.3–17.7)
LYMPHOCYTES # BLD AUTO: 2.7 10E9/L (ref 0.8–5.3)
LYMPHOCYTES NFR BLD AUTO: 34.8 %
MCH RBC QN AUTO: 30.2 PG (ref 26.5–33)
MCHC RBC AUTO-ENTMCNC: 32.8 G/DL (ref 31.5–36.5)
MCV RBC AUTO: 92 FL (ref 78–100)
MICROALBUMIN UR-MCNC: 910 MG/L
MICROALBUMIN/CREAT UR: 1691.45 MG/G CR (ref 0–17)
MONOCYTES # BLD AUTO: 0.7 10E9/L (ref 0–1.3)
MONOCYTES NFR BLD AUTO: 9.2 %
NEUTROPHILS # BLD AUTO: 4 10E9/L (ref 1.6–8.3)
NEUTROPHILS NFR BLD AUTO: 52.3 %
PLATELET # BLD AUTO: 260 10E9/L (ref 150–450)
RBC # BLD AUTO: 4.44 10E12/L (ref 4.4–5.9)
WBC # BLD AUTO: 7.7 10E9/L (ref 4–11)

## 2018-05-08 PROCEDURE — 99214 OFFICE O/P EST MOD 30 MIN: CPT | Performed by: FAMILY MEDICINE

## 2018-05-08 PROCEDURE — 85025 COMPLETE CBC W/AUTO DIFF WBC: CPT | Performed by: FAMILY MEDICINE

## 2018-05-08 PROCEDURE — 83036 HEMOGLOBIN GLYCOSYLATED A1C: CPT | Performed by: FAMILY MEDICINE

## 2018-05-08 PROCEDURE — 36415 COLL VENOUS BLD VENIPUNCTURE: CPT | Performed by: FAMILY MEDICINE

## 2018-05-08 PROCEDURE — 82043 UR ALBUMIN QUANTITATIVE: CPT | Performed by: FAMILY MEDICINE

## 2018-05-08 PROCEDURE — 80048 BASIC METABOLIC PNL TOTAL CA: CPT | Performed by: FAMILY MEDICINE

## 2018-05-08 NOTE — MR AVS SNAPSHOT
After Visit Summary   5/8/2018    Linwood Chi    MRN: [id number]           Patient Information     Date Of Birth          5/27/1930        Visit Information        Provider Department      5/8/2018 3:30 PM Bernie Amaro MD [facility]        Today's Diagnoses     Type 2 diabetes mellitus with diabetic nephropathy, without long-term current use of insulin (H)    -  1    Benign essential hypertension        CKD (chronic kidney disease) stage 3, GFR 30-59 ml/min          Care Instructions    [facility]    If you have any questions regarding to your visit please contact your care team:       Team Purple:   Clinic Hours Telephone Number   Dr. Bernie Salcido   7am-7pm  Monday - Thursday   7am-5pm  Fridays  [phone]  (Appointment scheduling available 24/7)    Questions about your recent visit?   Team Line:  [phone]   Urgent Care - [place] and [facility] - 11am-9pm Monday-Friday Saturday-Sunday- 9am-5pm   [place] - 5pm-9pm Monday-Friday Saturday-Sunday- 9am-5pm  [phone] - [place]  [phone] - [place]       What options do I have for a visit other than an office visit? We offer electronic visits (e-visits) and telephone visits, when medically appropriate.  Please check with your medical insurance to see if these types of visits are covered, as you will be responsible for any charges that are not paid by your insurance.      You can use Blurr (secure electronic communication) to access to your chart, send your primary care provider a message, or make an appointment. Ask a team member how to get started.     For a price quote for your services, please call our Consumer Price Line at [phone] or our Imaging Cost estimation line at [phone] (for imaging tests).              Follow-ups after your visit        Your next 10 appointments already scheduled     Jun 18, 2018  2:00 PM  "CDT   Anticoagulation Visit with SILVA ANTI COAG   [facility] ([facility])    [address]  [place] [address] [phone]              Who to contact     If you have questions or need follow up information about today's clinic visit or your schedule please contact [facility] directly at [phone].  Normal or non-critical lab and imaging results will be communicated to you by MyChart, letter or phone within 4 business days after the clinic has received the results. If you do not hear from us within 7 days, please contact the clinic through EmpowrNethart or phone. If you have a critical or abnormal lab result, we will notify you by phone as soon as possible.  Submit refill requests through Sensorion or call your pharmacy and they will forward the refill request to us. Please allow 3 business days for your refill to be completed.          Additional Information About Your Visit        MyCharWinProbe Information     Sensorion lets you send messages to your doctor, view your test results, renew your prescriptions, schedule appointments and more. To sign up, go to [email or web address] . Click on \"Log in\" on the left side of the screen, which will take you to the Welcome page. Then click on \"Sign up Now\" on the right side of the page.     You will be asked to enter the access code listed below, as well as some personal information. Please follow the directions to create your username and password.     Your access code is: [email or web address]  Expires: 2018  4:01 PM     Your access code will  in 90 days. If you need help or a new code, please call your [place] clinic or [phone].        Care EveryWhere ID     This is your Care EveryWhere ID. This could be used by other organizations to access your [place] medical records  [id number]        Your Vitals Were     Pulse Temperature Respirations Height Pulse Oximetry BMI (Body Mass Index)    55 96.3  F (35.7  C) (Oral) 24 " "5' 6\" (1.676 m) 98% 30.42 kg/m2       Blood Pressure from Last 3 Encounters:   05/08/18 120/60   02/12/18 132/70   04/17/17 142/62    Weight from Last 3 Encounters:   05/08/18 188 lb 8 oz (85.5 kg)   02/12/18 191 lb (86.6 kg)   04/17/17 190 lb (86.2 kg)              We Performed the Following     Albumin Random Urine Quantitative with Creat Ratio     Basic metabolic panel     CBC with platelets differential     Hemoglobin A1c        Primary Care Provider Office Phone # Fax #    Bernie Amaro [phone] [address] [address]        Equal Access to Services     CHALINO SNOW : Hadii waldo donaldo Soderek, waaxda luqadaha, qaybta kaalmada adeegyada, teresita bay . So [facility] [phone].    ATENCIÓN: Si habla español, tiene a khanna disposición servicios gratuitos de asistencia lingüística. [facility] [phone].    We comply with applicable federal civil rights laws and [place] laws. We do not discriminate on the basis of race, color, national origin, age, disability, sex, sexual orientation, or gender identity.            Thank you!     Thank you for choosing [facility]  for your care. Our goal is always to provide you with excellent care. Hearing back from our patients is one way we can continue to improve our services. Please take a few minutes to complete the written survey that you may receive in the mail after your visit with us. Thank you!             Your Updated Medication List - Protect others around you: Learn how to safely use, store and throw away your medicines at www.disposemymeds.org.          This list is accurate as of 5/8/18  4:02 PM.  Always use your most recent med list.                   Brand Name Dispense Instructions for use Diagnosis    amLODIPine 5 MG tablet    NORVASC    90 tablet    TAKE 1 TABLET(5 MG) BY MOUTH DAILY    CKD (chronic kidney disease) stage 3, GFR 30-59 ml/min       aspirin 81 MG " tablet      Take 1 tablet by mouth daily.    Type 2 diabetes, HbA1c goal < 7% (H)       atorvastatin 40 MG tablet    LIPITOR    90 tablet    TAKE 1 TABLET(40 MG) BY MOUTH DAILY    Hyperlipidemia with target LDL less than 100, Benign essential hypertension       dorzolamide-timolol 2-0.5 % ophthalmic solution    COSOPT     Place 1 drop Into the left eye 2 times daily        latanoprost 0.005 % ophthalmic solution    XALATAN     Place 1 drop Into the left eye daily        lisinopril 20 MG tablet    PRINIVIL/ZESTRIL    90 tablet    TAKE 1 TABLET(20 MG) BY MOUTH DAILY    CKD (chronic kidney disease) stage 3, GFR 30-59 ml/min       metFORMIN 500 MG tablet    GLUCOPHAGE    180 tablet    TAKE 1 TABLET BY MOUTH TWICE DAILY WITH MEALS    Type 2 diabetes mellitus with diabetic nephropathy, without long-term current use of insulin (H)       metoprolol tartrate 25 MG tablet    LOPRESSOR    90 tablet    Take one a day    Benign essential hypertension       omeprazole 20 MG CR capsule    priLOSEC    90 capsule    TAKE 1 CAPSULE BY MOUTH EVERY DAY 30 TO 60 MINUTES BEFORE A MEAL    Gastroesophageal reflux disease without esophagitis       order for DME     1 each    Equipment being ordered: Mild compression hose    Pain and swelling of left lower leg       warfarin 5 MG tablet    COUMADIN    50 tablet    TAKE ONE-HALF TABLET BY MOUTH ON DAILY EXCEPT TAKE 1 TABLET DAILY ON MONDAYS    Atrial fibrillation, unspecified type (H)

## 2018-05-08 NOTE — PATIENT INSTRUCTIONS
[facility]    If you have any questions regarding to your visit please contact your care team:       Team Purple:   Clinic Hours Telephone Number   Dr. Bernie Salcido   7am-7pm  Monday - Thursday   7am-5pm  Fridays  [phone]  (Appointment scheduling available 24/7)    Questions about your recent visit?   Team Line:  [phone]   Urgent Care - [place] and [facility] - 11am-9pm Monday-Friday Saturday-Sunday- 9am-5pm   [place] - 5pm-9pm Monday-Friday Saturday-Sunday- 9am-5pm  [phone] - [place]  [phone] [facility]       What options do I have for a visit other than an office visit? We offer electronic visits (e-visits) and telephone visits, when medically appropriate.  Please check with your medical insurance to see if these types of visits are covered, as you will be responsible for any charges that are not paid by your insurance.      You can use Tynt (secure electronic communication) to access to your chart, send your primary care provider a message, or make an appointment. Ask a team member how to get started.     For a price quote for your services, please call our Consumer Price Line at [phone] or our Imaging Cost estimation line at [phone] (for imaging tests).

## 2018-05-09 LAB
ANION GAP SERPL CALCULATED.3IONS-SCNC: 9 MMOL/L (ref 3–14)
BUN SERPL-MCNC: 18 MG/DL (ref 7–30)
CALCIUM SERPL-MCNC: 8.9 MG/DL (ref 8.5–10.1)
CHLORIDE SERPL-SCNC: 106 MMOL/L (ref 94–109)
CO2 SERPL-SCNC: 25 MMOL/L (ref 20–32)
CREAT SERPL-MCNC: 1.19 MG/DL (ref 0.66–1.25)
GFR SERPL CREATININE-BSD FRML MDRD: 58 ML/MIN/1.7M2
GLUCOSE SERPL-MCNC: 95 MG/DL (ref 70–99)
POTASSIUM SERPL-SCNC: 4.4 MMOL/L (ref 3.4–5.3)
SODIUM SERPL-SCNC: 140 MMOL/L (ref 133–144)

## 2018-06-18 ENCOUNTER — ANTICOAGULATION THERAPY VISIT (OUTPATIENT)
Dept: NURSING | Facility: CLINIC | Age: 83
End: 2018-06-18
Payer: COMMERCIAL

## 2018-06-18 DIAGNOSIS — I48.91 ATRIAL FIBRILLATION (H): ICD-10-CM

## 2018-06-18 DIAGNOSIS — Z79.01 LONG-TERM (CURRENT) USE OF ANTICOAGULANTS: ICD-10-CM

## 2018-06-18 LAB — INR POINT OF CARE: 2.5 (ref 0.86–1.14)

## 2018-06-18 PROCEDURE — 99207 ZZC NO CHARGE NURSE ONLY: CPT

## 2018-06-18 PROCEDURE — 36416 COLLJ CAPILLARY BLOOD SPEC: CPT

## 2018-06-18 PROCEDURE — 85610 PROTHROMBIN TIME: CPT | Mod: QW

## 2018-06-18 NOTE — PROGRESS NOTES
ANTICOAGULATION FOLLOW-UP CLINIC VISIT    Patient Name:  Linwood Chi  Date:  6/18/2018  Contact Type:  Face to Face    SUBJECTIVE:     Patient Findings     Positives No Problem Findings           OBJECTIVE    INR Protime   Date Value Ref Range Status   06/18/2018 2.5 (A) 0.86 - 1.14 Final       ASSESSMENT / PLAN  INR assessment THER    Recheck INR In: 6 WEEKS    INR Location Clinic      Anticoagulation Summary as of 6/18/2018     INR goal 2.0-3.0   Today's INR 2.5   Warfarin maintenance plan 5 mg (5 mg x 1) on Mon; 2.5 mg (5 mg x 0.5) all other days   Full warfarin instructions 5 mg on Mon; 2.5 mg all other days   Weekly warfarin total 20 mg   No change documented Layla Garcia RN   Plan last modified Nannette Muhammad RN (4/11/2016)   Next INR check 7/30/2018   Priority INR   Target end date Indefinite    Indications   Long-term (current) use of anticoagulants [Z79.01] [Z79.01]  Atrial fibrillation (HCC) [I48.91] [I48.91]         Anticoagulation Episode Summary     INR check location     Preferred lab     Send INR reminders to [facility]    Comments       Anticoagulation Care Providers     Provider Role Specialty Phone number    Bernie Amaro MD [facility] [phone]            See the Encounter Report to view Anticoagulation Flowsheet and Dosing Calendar (Go to Encounters tab in chart review, and find the Anticoagulation Therapy Visit)    Dosage adjustment made based on physician directed care plan.    Layla Garcia RN

## 2018-06-18 NOTE — MR AVS SNAPSHOT
Linwood Chi   6/18/2018 4:00 PM   Anticoagulation Therapy Visit    Description:  88 year old male   Provider:  MIRANDA ANTI COAG   Department:  [facility]           INR as of 6/18/2018     Today's INR 2.5      Anticoagulation Summary as of 6/18/2018     INR goal 2.0-3.0   Today's INR 2.5   Full warfarin instructions 5 mg on Mon; 2.5 mg all other days   Next INR check 7/30/2018    Indications   Long-term (current) use of anticoagulants [Z79.01] [Z79.01]  Atrial fibrillation (HCC) [I48.91] [I48.91]         Your next Anticoagulation Clinic appointment(s)     Jul 30, 2018  2:00 PM CDT   Anticoagulation Visit with MIRANDA ANTI JUANA   [facility] ([address] [address] [phone]              Contact Numbers     [facility]  Please call [phone] to cancel and/or reschedule your appointment   Please call [phone] with any problems or questions regarding your therapy.        June 2018 Details    Sun Mon Tue Wed Thu Fri Sat          1               2                 3               4               5               6               7               8               9                 10               11               12               13               14               15               16                 17               18      5 mg   See details      19      2.5 mg         20      2.5 mg         21      2.5 mg         22      2.5 mg         23      2.5 mg           24      2.5 mg         25      5 mg         26      2.5 mg         27      2.5 mg         28      2.5 mg         29      2.5 mg         30      2.5 mg          Date Details   06/18 This INR check               How to take your warfarin dose     To take:  2.5 mg Take 0.5 of a 5 mg tablet.    To take:  5 mg Take 1 of the 5 mg tablets.           July 2018 Details    Sun Mon Tue Wed Thu Fri Sat     1      2.5 mg         2      5 mg         3      2.5 mg         4      2.5 mg         5      2.5  mg         6      2.5 mg         7      2.5 mg           8      2.5 mg         9      5 mg         10      2.5 mg         11      2.5 mg         12      2.5 mg         13      2.5 mg         14      2.5 mg           15      2.5 mg         16      5 mg         17      2.5 mg         18      2.5 mg         19      2.5 mg         20      2.5 mg         21      2.5 mg           22      2.5 mg         23      5 mg         24      2.5 mg         25      2.5 mg         26      2.5 mg         27      2.5 mg         28      2.5 mg           29      2.5 mg         30            31                    Date Details   No additional details    Date of next INR:  7/30/2018         How to take your warfarin dose     To take:  2.5 mg Take 0.5 of a 5 mg tablet.    To take:  5 mg Take 1 of the 5 mg tablets.

## 2018-07-30 ENCOUNTER — ANTICOAGULATION THERAPY VISIT (OUTPATIENT)
Dept: NURSING | Facility: CLINIC | Age: 83
End: 2018-07-30
Payer: COMMERCIAL

## 2018-07-30 DIAGNOSIS — I48.91 ATRIAL FIBRILLATION (H): ICD-10-CM

## 2018-07-30 DIAGNOSIS — Z79.01 LONG-TERM (CURRENT) USE OF ANTICOAGULANTS: ICD-10-CM

## 2018-07-30 LAB — INR POINT OF CARE: 2.6 (ref 0.86–1.14)

## 2018-07-30 PROCEDURE — 99207 ZZC NO CHARGE NURSE ONLY: CPT

## 2018-07-30 PROCEDURE — 36416 COLLJ CAPILLARY BLOOD SPEC: CPT

## 2018-07-30 PROCEDURE — 85610 PROTHROMBIN TIME: CPT | Mod: QW

## 2018-07-30 NOTE — PROGRESS NOTES
ANTICOAGULATION FOLLOW-UP CLINIC VISIT    Patient Name:  Linwood Chi  Date:  7/30/2018  Contact Type:  Face to Face    SUBJECTIVE:     Patient Findings     Positives No Problem Findings           OBJECTIVE    INR Protime   Date Value Ref Range Status   07/30/2018 2.6 (A) 0.86 - 1.14 Final       ASSESSMENT / PLAN  INR assessment THER    Recheck INR In: 6 WEEKS    INR Location Clinic      Anticoagulation Summary as of 7/30/2018     INR goal 2.0-3.0   Today's INR 2.6   Warfarin maintenance plan 5 mg (5 mg x 1) on Mon; 2.5 mg (5 mg x 0.5) all other days   Full warfarin instructions 5 mg on Mon; 2.5 mg all other days   Weekly warfarin total 20 mg   No change documented Layla Garcia RN   Plan last modified Nannette Muhammad RN (4/11/2016)   Next INR check 9/10/2018   Priority INR   Target end date Indefinite    Indications   Long-term (current) use of anticoagulants [Z79.01] [Z79.01]  Atrial fibrillation (HCC) [I48.91] [I48.91]         Anticoagulation Episode Summary     INR check location     Preferred lab     Send INR reminders to [facility] CL[facility]IC    Comments       Anticoagulation Care Providers     Provider Role Specialty Phone number    Bernie Amaro MD [facility] [phone]            See the Encounter Report to view Anticoagulation Flowsheet and Dosing Calendar (Go to Encounters tab in chart review, and find the Anticoagulation Therapy Visit)    Dosage adjustment made based on physician directed care plan.    Layla Garcia RN

## 2018-07-30 NOTE — MR AVS SNAPSHOT
Linwood Chi   7/30/2018 2:00 PM   Anticoagulation Therapy Visit    Description:  88 year old male   Provider:  MIRANDA ANTI COAG   Department:  [facility]           INR as of 7/30/2018     Today's INR 2.6      Anticoagulation Summary as of 7/30/2018     INR goal 2.0-3.0   Today's INR 2.6   Full warfarin instructions 5 mg on Mon; 2.5 mg all other days   Next INR check 9/10/2018    Indications   Long-term (current) use of anticoagulants [Z79.01] [Z79.01]  Atrial fibrillation (HCC) [I48.91] [I48.91]         Your next Anticoagulation Clinic appointment(s)     Jul 30, 2018  2:00 PM CDT   Anticoagulation Visit with MIRANDA ANTI COAG   [facility] ([address] [address] [phone]            Sep 10, 2018  2:00 PM CDT   Anticoagulation Visit with MIRANDA ANTI COAG   [facility] ([address] [address] [phone]              Contact Numbers     [facility]  Please call [phone] to cancel and/or reschedule your appointment   Please call [phone] with any problems or questions regarding your therapy.        July 2018 Details    Sun Mon Tue Wed Thu Fri Sat     1               2               3               4               5               6               7                 8               9               10               11               12               13               14                 15               16               17               18               19               20               21                 22               23               24               25               26               27               28                 29               30      5 mg   See details      31      2.5 mg              Date Details   07/30 This INR check               How to take your warfarin dose     To take:  2.5 mg Take 0.5 of a 5 mg tablet.    To take:  5 mg Take 1 of the 5 mg tablets.            August 2018 Details    Sun Mon Tue Wed Thu Fri Sat        1      2.5 mg         2      2.5 mg         3      2.5 mg         4      2.5 mg           5      2.5 mg         6      5 mg         7      2.5 mg         8      2.5 mg         9      2.5 mg         10      2.5 mg         11      2.5 mg           12      2.5 mg         13      5 mg         14      2.5 mg         15      2.5 mg         16      2.5 mg         17      2.5 mg         18      2.5 mg           19      2.5 mg         20      5 mg         21      2.5 mg         22      2.5 mg         23      2.5 mg         24      2.5 mg         25      2.5 mg           26      2.5 mg         27      5 mg         28      2.5 mg         29      2.5 mg         30      2.5 mg         31      2.5 mg           Date Details   No additional details            How to take your warfarin dose     To take:  2.5 mg Take 0.5 of a 5 mg tablet.    To take:  5 mg Take 1 of the 5 mg tablets.           September 2018 Details    Sun Mon Tue Wed u Fri Sat           1      2.5 mg           2      2.5 mg         3      5 mg         4      2.5 mg         5      2.5 mg         6      2.5 mg         7      2.5 mg         8      2.5 mg           9      2.5 mg         10            11               12               13               14               15                 16               17               18               19               20               21               22                 23               24               25               26               27               28               29                 30                      Date Details   No additional details    Date of next INR:  9/10/2018         How to take your warfarin dose     To take:  2.5 mg Take 0.5 of a 5 mg tablet.    To take:  5 mg Take 1 of the 5 mg tablets.

## 2018-08-31 DIAGNOSIS — I48.91 ATRIAL FIBRILLATION, UNSPECIFIED TYPE (H): ICD-10-CM

## 2018-08-31 RX ORDER — WARFARIN SODIUM 5 MG/1
TABLET ORAL
Qty: 136 TABLET | Refills: 0 | Status: SHIPPED | OUTPATIENT
Start: 2018-08-31 | End: 2019-07-09

## 2018-09-10 ENCOUNTER — ANTICOAGULATION THERAPY VISIT (OUTPATIENT)
Dept: NURSING | Facility: CLINIC | Age: 83
End: 2018-09-10
Payer: COMMERCIAL

## 2018-09-10 ENCOUNTER — TELEPHONE (OUTPATIENT)
Dept: FAMILY MEDICINE | Facility: CLINIC | Age: 83
End: 2018-09-10

## 2018-09-10 DIAGNOSIS — Z79.01 LONG-TERM (CURRENT) USE OF ANTICOAGULANTS: ICD-10-CM

## 2018-09-10 DIAGNOSIS — I48.91 ATRIAL FIBRILLATION (H): ICD-10-CM

## 2018-09-10 DIAGNOSIS — Z79.01 LONG-TERM (CURRENT) USE OF ANTICOAGULANTS: Primary | ICD-10-CM

## 2018-09-10 LAB — INR POINT OF CARE: 2.3 (ref 0.86–1.14)

## 2018-09-10 PROCEDURE — 36416 COLLJ CAPILLARY BLOOD SPEC: CPT

## 2018-09-10 PROCEDURE — 85610 PROTHROMBIN TIME: CPT | Mod: QW

## 2018-09-10 PROCEDURE — 99207 ZZC NO CHARGE NURSE ONLY: CPT

## 2018-09-10 NOTE — PROGRESS NOTES
ANTICOAGULATION FOLLOW-UP CLINIC VISIT    Patient Name:  Linwood Chi  Date:  9/10/2018  Contact Type:  Face to Face    SUBJECTIVE:     Patient Findings     Positives No Problem Findings    Comments Bleeding Signs/Symptoms: NO  Thromboembolic Signs/Symptoms: NO     Medication Changes:  NO  Dietary Changes:  NO  Bacterial/Viral Infection: NO     Missed Coumadin Doses: NO  Other Concerns:  NO           OBJECTIVE    INR Protime   Date Value Ref Range Status   09/10/2018 2.3 (A) 0.86 - 1.14 Final       ASSESSMENT / PLAN  INR assessment THER    Recheck INR In: 6 WEEKS    INR Location Clinic      Anticoagulation Summary as of 9/10/2018     INR goal 2.0-3.0   Today's INR 2.3   Warfarin maintenance plan 5 mg (5 mg x 1) on Mon; 2.5 mg (5 mg x 0.5) all other days   Full warfarin instructions 5 mg on Mon; 2.5 mg all other days   Weekly warfarin total 20 mg   No change documented Layla Garcia RN   Plan last modified Nannette Muhammad RN (4/11/2016)   Next INR check 10/22/2018   Priority INR   Target end date Indefinite    Indications   Long-term (current) use of anticoagulants [Z79.01] [Z79.01]  Atrial fibrillation (HCC) [I48.91] [I48.91]         Anticoagulation Episode Summary     INR check location     Preferred lab     Send INR reminders to [facility]    Comments       Anticoagulation Care Providers     Provider Role Specialty Phone number    Bernie Amaro MD [facility] [phone]            See the Encounter Report to view Anticoagulation Flowsheet and Dosing Calendar (Go to Encounters tab in chart review, and find the Anticoagulation Therapy Visit)    Dosage adjustment made based on physician directed care plan.    Layla Garcia RN

## 2018-09-10 NOTE — MR AVS SNAPSHOT
Linwood Chi   9/10/2018 2:00 PM   Anticoagulation Therapy Visit    Description:  88 year old male   Provider:  MIRANDA ANTI COAG   Department:  [facility]           INR as of 9/10/2018     Today's INR 2.3      Anticoagulation Summary as of 9/10/2018     INR goal 2.0-3.0   Today's INR 2.3   Full warfarin instructions 5 mg on Mon; 2.5 mg all other days   Next INR check 10/22/2018    Indications   Long-term (current) use of anticoagulants [Z79.01] [Z79.01]  Atrial fibrillation (HCC) [I48.91] [I48.91]         Your next Anticoagulation Clinic appointment(s)     Sep 10, 2018  2:00 PM CDT   Anticoagulation Visit with MIRANDA ANTI COAG   [facility] ([address] [address] [phone]            Oct 22, 2018  2:00 PM CDT   Anticoagulation Visit with MIRANDA ANTI COAG   [facility] ([address] [address] [phone]              Contact Numbers     [facility]  Please call [phone] to cancel and/or reschedule your appointment   Please call [phone] with any problems or questions regarding your therapy.        September 2018 Details    Sun Mon Tue Wed Thu Fri Sat           1                 2               3               4               5               6               7               8                 9               10      5 mg   See details      11      2.5 mg         12      2.5 mg         13      2.5 mg         14      2.5 mg         15      2.5 mg           16      2.5 mg         17      5 mg         18      2.5 mg         19      2.5 mg         20      2.5 mg         21      2.5 mg         22      2.5 mg           23      2.5 mg         24      5 mg         25      2.5 mg         26      2.5 mg         27      2.5 mg         28      2.5 mg         29      2.5 mg           30      2.5 mg                Date Details   09/10 This INR check               How to take your warfarin  dose     To take:  2.5 mg Take 0.5 of a 5 mg tablet.    To take:  5 mg Take 1 of the 5 mg tablets.           October 2018 Details    Sun Mon Tue Wed Thu Fri Sat      1      5 mg         2      2.5 mg         3      2.5 mg         4      2.5 mg         5      2.5 mg         6      2.5 mg           7      2.5 mg         8      5 mg         9      2.5 mg         10      2.5 mg         11      2.5 mg         12      2.5 mg         13      2.5 mg           14      2.5 mg         15      5 mg         16      2.5 mg         17      2.5 mg         18      2.5 mg         19      2.5 mg         20      2.5 mg           21      2.5 mg         22            23               24               25               26               27                 28               29               30               31                   Date Details   No additional details    Date of next INR:  10/22/2018         How to take your warfarin dose     To take:  2.5 mg Take 0.5 of a 5 mg tablet.    To take:  5 mg Take 1 of the 5 mg tablets.

## 2018-09-10 NOTE — TELEPHONE ENCOUNTER
Has the patient previously taken warfarin? yes  If yes, for what indication? Atrial Fibrillation     Does the patient have any of the following indications for a higher range of 2.5-3.5:    Mitral position mechanical valve? yes    Courtney-Shiley, Ball and Cage or Monoleaflet valve (regardless of position) no    Other (if yes, please explain) no      Annual INR referral needed.  Orders teed up.    Layla Garcia RN - [facility]

## 2018-10-22 ENCOUNTER — ANTICOAGULATION THERAPY VISIT (OUTPATIENT)
Dept: NURSING | Facility: CLINIC | Age: 83
End: 2018-10-22
Payer: COMMERCIAL

## 2018-10-22 DIAGNOSIS — I48.91 ATRIAL FIBRILLATION (H): ICD-10-CM

## 2018-10-22 LAB — INR POINT OF CARE: 2.6 (ref 0.86–1.14)

## 2018-10-22 PROCEDURE — 36416 COLLJ CAPILLARY BLOOD SPEC: CPT

## 2018-10-22 PROCEDURE — 99207 ZZC NO CHARGE NURSE ONLY: CPT

## 2018-10-22 PROCEDURE — 85610 PROTHROMBIN TIME: CPT | Mod: QW

## 2018-10-22 NOTE — MR AVS SNAPSHOT
Linwood Chi   10/22/2018 2:00 PM   Anticoagulation Therapy Visit    Description:  88 year old male   Provider:  MIRANDA ANTI COAG   Department:  [facility]           INR as of 10/22/2018     Today's INR 2.6      Anticoagulation Summary as of 10/22/2018     INR goal 2.0-3.0   Today's INR 2.6   Full warfarin instructions 5 mg on Mon; 2.5 mg all other days   Next INR check 12/3/2018    Indications   Long-term (current) use of anticoagulants [Z79.01] [Z79.01]  Atrial fibrillation (HCC) [I48.91] [I48.91]         Your next Anticoagulation Clinic appointment(s)     Oct 22, 2018  2:00 PM CDT   Anticoagulation Visit with MIRANDA ANTI COAG   [facility]    [address] [facility] [address] [phone]            Dec 03, 2018  2:00 PM CST   Anticoagulation Visit with MIRANDA ANTI COAG   [facility] ([place] [id number] [facility] [address] [phone]              Contact Numbers     [facility]  Please call [phone] to cancel and/or reschedule your appointment   Please call [phone] with any problems or questions regarding your therapy.        October 2018 Details    Sun Mon Tue Wed Thu Fri Sat      1               2               3               4               5               6                 7               8               9               10               11               12               13                 14               15               16               17               18               19               20                 21               22      5 mg   See details      23      2.5 mg         24      2.5 mg         25      2.5 mg         26      2.5 mg         27      2.5 mg           28      2.5 mg         29      5 mg         30      2.5 mg         31      2.5 mg             Date Details   10/22 This INR check               How to take your warfarin dose     To take:  2.5 mg Take 0.5 of a 5 mg tablet.    To  take:  5 mg Take 1 of the 5 mg tablets.           November 2018 Details    Sun Mon Tue Wed Thu Fri Sat         1      2.5 mg         2      2.5 mg         3      2.5 mg           4      2.5 mg         5      5 mg         6      2.5 mg         7      2.5 mg         8      2.5 mg         9      2.5 mg         10      2.5 mg           11      2.5 mg         12      5 mg         13      2.5 mg         14      2.5 mg         15      2.5 mg         16      2.5 mg         17      2.5 mg           18      2.5 mg         19      5 mg         20      2.5 mg         21      2.5 mg         22      2.5 mg         23      2.5 mg         24      2.5 mg           25      2.5 mg         26      5 mg         27      2.5 mg         28      2.5 mg         29      2.5 mg         30      2.5 mg           Date Details   No additional details            How to take your warfarin dose     To take:  2.5 mg Take 0.5 of a 5 mg tablet.    To take:  5 mg Take 1 of the 5 mg tablets.           December 2018 Details    Sun Mon Tue Wed Thu Fri Sat           1      2.5 mg           2      2.5 mg         3            4               5               6               7               8                 9               10               11               12               13               14               15                 16               17               18               19               20               21               22                 23               24               25               26               27               28               29                 30               31                     Date Details   No additional details    Date of next INR:  12/3/2018         How to take your warfarin dose     To take:  2.5 mg Take 0.5 of a 5 mg tablet.    To take:  5 mg Take 1 of the 5 mg tablets.

## 2018-10-22 NOTE — PROGRESS NOTES
ANTICOAGULATION FOLLOW-UP CLINIC VISIT    Patient Name:  Linwood Chi  Date:  10/22/2018  Contact Type:  Face to Face    SUBJECTIVE:     Patient Findings     Positives No Problem Findings    Comments Bleeding Signs/Symptoms: NO  Thromboembolic Signs/Symptoms: NO     Medication Changes:  NO  Dietary Changes:  NO  Bacterial/Viral Infection: NO     Missed Coumadin Doses: NO  Other Concerns:  NO             OBJECTIVE    INR Protime   Date Value Ref Range Status   10/22/2018 2.6 (A) 0.86 - 1.14 Final       ASSESSMENT / PLAN  INR assessment THER    Recheck INR In: 6 WEEKS    INR Location Clinic      Anticoagulation Summary as of 10/22/2018     INR goal 2.0-3.0   Today's INR 2.6   Warfarin maintenance plan 5 mg (5 mg x 1) on Mon; 2.5 mg (5 mg x 0.5) all other days   Full warfarin instructions 5 mg on Mon; 2.5 mg all other days   Weekly warfarin total 20 mg   No change documented Layla Garcia RN   Plan last modified Nannette Muhammad RN (4/11/2016)   Next INR check 12/3/2018   Priority INR   Target end date Indefinite    Indications   Long-term (current) use of anticoagulants [Z79.01] [Z79.01]  Atrial fibrillation (HCC) [I48.91] [I48.91]         Anticoagulation Episode Summary     INR check location     Preferred lab     Send INR reminders to [facility]    Comments       Anticoagulation Care Providers     Provider Role Specialty Phone number    Bernie Amaro MD [facility] [phone]            See the Encounter Report to view Anticoagulation Flowsheet and Dosing Calendar (Go to Encounters tab in chart review, and find the Anticoagulation Therapy Visit)    Dosage adjustment made based on physician directed care plan.    Layla Garcia RN

## 2018-10-30 ENCOUNTER — TRANSFERRED RECORDS (OUTPATIENT)
Dept: HEALTH INFORMATION MANAGEMENT | Facility: CLINIC | Age: 83
End: 2018-10-30

## 2018-10-30 LAB — INR PPP: 1.7

## 2018-10-31 ENCOUNTER — TRANSFERRED RECORDS (OUTPATIENT)
Dept: HEALTH INFORMATION MANAGEMENT | Facility: CLINIC | Age: 83
End: 2018-10-31

## 2018-11-01 LAB — INR PPP: 1.1

## 2018-11-02 LAB — INR PPP: 1.2

## 2018-11-03 LAB — INR PPP: 1.3

## 2018-11-05 ENCOUNTER — TELEPHONE (OUTPATIENT)
Dept: FAMILY MEDICINE | Facility: CLINIC | Age: 83
End: 2018-11-05

## 2018-11-05 NOTE — TELEPHONE ENCOUNTER
Called with verbal ok for orders requested per RN protocol. Asked if INR can be done at visit and they will make note of that. Provided number for INR vm.    Brianne Gibbs RN  [facility]

## 2018-11-05 NOTE — TELEPHONE ENCOUNTER
Reason for Call: Request for an order or referral:    Order or referral being requested: Verbal order for delayed home care till 11/6     Date needed: as soon as possible    Has the patient been seen by the PCP for this problem? NO    Additional comments: none     Phone number Patient can be reached at:  Other phone number:  [phone]    Best Time:  Any     Can we leave a detailed message on this number?  YES    Call taken on 11/5/2018 at 10:57 AM by Jhoan Franklin

## 2018-11-06 ENCOUNTER — ANTICOAGULATION THERAPY VISIT (OUTPATIENT)
Dept: NURSING | Facility: CLINIC | Age: 83
End: 2018-11-06

## 2018-11-06 ENCOUNTER — TELEPHONE (OUTPATIENT)
Dept: FAMILY MEDICINE | Facility: CLINIC | Age: 83
End: 2018-11-06

## 2018-11-06 DIAGNOSIS — I48.91 ATRIAL FIBRILLATION (H): ICD-10-CM

## 2018-11-06 LAB — INR PPP: 1.9

## 2018-11-06 NOTE — TELEPHONE ENCOUNTER
Called and left detailed message with verbal ok for orders requested per RN protocol. Dr. Amaro is PCP and will follow pt for homecare. Call back to the RN hotline if any further questions or concerns.    Brianne Gibbs RN  [facility]

## 2018-11-06 NOTE — MR AVS SNAPSHOT
Linwood Chi   11/6/2018   Anticoagulation Therapy Visit    Description:  88 year old male   Provider:  Bernie Amaro MD   Department:   Nurse           INR as of 11/6/2018     Today's INR 1.9!      Anticoagulation Summary as of 11/6/2018     INR goal 2.0-3.0   Today's INR 1.9!   Full warfarin instructions 5 mg on Mon; 2.5 mg all other days   Next INR check 11/13/2018    Indications   Long-term (current) use of anticoagulants [Z79.01] [Z79.01]  Atrial fibrillation (HCC) [I48.91] [I48.91]         Your next Anticoagulation Clinic appointment(s)     Dec 03, 2018  2:00 PM CST   Anticoagulation Visit with FZ ANTI COAG   [facility] ([address] [id number] [phone]              Contact Numbers     [facility]  Please call [phone] to cancel and/or reschedule your appointment   Please call [phone] with any problems or questions regarding your therapy.        November 2018 Details    Sun Mon Tue Wed Thu Fri Sat         1               2               3                 4               5               6      2.5 mg   See details      7      2.5 mg         8      2.5 mg         9      2.5 mg         10      2.5 mg           11      2.5 mg         12      5 mg         13            14               15               16               17                 18               19               20               21               22               23               24                 25               26               27               28               29               30                 Date Details   11/06 This INR check       Date of next INR:  11/13/2018         How to take your warfarin dose     To take:  2.5 mg Take 0.5 of a 5 mg tablet.    To take:  5 mg Take 1 of the 5 mg tablets.

## 2018-11-06 NOTE — TELEPHONE ENCOUNTER
Reason for Call: Request for an order or referral:    Order or referral being requested:   Skilled nursing   PT and OT     Date needed: as soon as possible    Has the patient been seen by the PCP for this problem? NO    Additional comments: Need to confirm  Will follow PT for homecare.     Phone number Patient can be reached at:  Other phone number:  [phone]    Best Time:  Any     Can we leave a detailed message on this number?  YES    Call taken on 11/6/2018 at 11:47 AM by Jhoan Franklin

## 2018-11-06 NOTE — PROGRESS NOTES
ANTICOAGULATION FOLLOW-UP CLINIC VISIT    Patient Name:  Linwood Chi  Date:  11/6/2018  Contact Type:  Telephone    SUBJECTIVE:     Patient Findings     Positives Hospital admission (10/30-11/3/18 rectal bleeding)    Comments S/O:  Rebeka DAWSON from [facility] care calls with patients INR today of 1.9 venous.  Linwood Chi is on Coumadin for A. Fib.  Current Coumadin dose is missed dose on 10/31, 11/1 and 11/2 5mg, then took maintenance dose.   Concerns today are: Recent hospitalization for rectal bleeding.    A/P: Linwood Chi's INR is Therapeutic  for his/her goal range of 2 - 3.  Reasons why INR is out of range may include: NA  Recommended to have Linwood Chi remain on the same Coumadin dose and to have his/her INR rechecked in 1 week on 11/13/18.    Brianne Gibbs RN  [facility]           OBJECTIVE    INR   Date Value Ref Range Status   11/06/2018 1.9  Final       ASSESSMENT / PLAN  INR assessment THER    Recheck INR In: 1 WEEK    INR Location Homecare INR      Anticoagulation Summary as of 11/6/2018     INR goal 2.0-3.0   Today's INR 1.9!   Warfarin maintenance plan 5 mg (5 mg x 1) on Mon; 2.5 mg (5 mg x 0.5) all other days   Full warfarin instructions 5 mg on Mon; 2.5 mg all other days   Weekly warfarin total 20 mg   No change documented Brianne Gibbs RN   Plan last modified Nannette Muhammad RN (4/11/2016)   Next INR check 11/13/2018   Priority INR   Target end date Indefinite    Indications   Long-term (current) use of anticoagulants [Z79.01] [Z79.01]  Atrial fibrillation (HCC) [I48.91] [I48.91]         Anticoagulation Episode Summary     INR check location     Preferred lab     Send INR reminders to [facility] CL[facility]    Comments       Anticoagulation Care Providers     Provider Role Specialty Phone number    Bernie Amaro MD [facility] [phone]            See the Encounter Report to view Anticoagulation Flowsheet and Dosing  Calendar (Go to Encounters tab in chart review, and find the Anticoagulation Therapy Visit)    Dosage adjustment made based on physician directed care plan.    Brianne Gibbs RN

## 2018-11-08 ENCOUNTER — TELEPHONE (OUTPATIENT)
Dept: FAMILY MEDICINE | Facility: CLINIC | Age: 83
End: 2018-11-08

## 2018-11-08 NOTE — TELEPHONE ENCOUNTER
Spoke with home care nurse and gave verbal okay for requested orders per standing orders.  She verbalized understanding.   Saloni Gillespie RN

## 2018-11-08 NOTE — TELEPHONE ENCOUNTER
Reason for Call: Request for an order or referral:    Order or referral being requested:  Home p/t 2 times per week for 2 weeks. For strength, gait and range of motion.     Date needed: as soon as possible    Has the patient been seen by the PCP for this problem? NO    Additional comments:  Na     Phone number Patient can be reached at:  Other phone number:  [phone]     Best Time:   Any     Can we leave a detailed message on this number?  YES    Call taken on 11/8/2018 at 11:22 AM by Genet Mix

## 2018-11-09 ENCOUNTER — ANTICOAGULATION THERAPY VISIT (OUTPATIENT)
Dept: NURSING | Facility: CLINIC | Age: 83
End: 2018-11-09
Payer: COMMERCIAL

## 2018-11-09 DIAGNOSIS — I48.91 ATRIAL FIBRILLATION (H): ICD-10-CM

## 2018-11-09 LAB — INR PPP: 2.5 (ref 0.9–?)

## 2018-11-09 NOTE — MR AVS SNAPSHOT
Linwood Chi   11/9/2018   Anticoagulation Therapy Visit    Description:  88 year old male   Provider:  Bernie Amaro MD   Department:  [facility]           INR as of 11/9/2018     Today's INR 2.5      Anticoagulation Summary as of 11/9/2018     INR goal 2.0-3.0   Today's INR 2.5   Full warfarin instructions 5 mg on Mon; 2.5 mg all other days   Next INR check 11/13/2018    Indications   Long-term (current) use of anticoagulants [Z79.01] [Z79.01]  Atrial fibrillation (HCC) [I48.91] [I48.91]         Your next Anticoagulation Clinic appointment(s)     Dec 03, 2018  2:00 PM CST   Anticoagulation Visit with SILVA ANTI COAG   [facility] ([address] [id number] [phone]              Contact Numbers     [facility]  Please call [phone] to cancel and/or reschedule your appointment   Please call [phone] with any problems or questions regarding your therapy.        November 2018 Details    Sun Mon Tue Wed Thu Fri Sat         1               2               3                 4               5               6               7               8               9      2.5 mg   See details      10      2.5 mg           11      2.5 mg         12      5 mg         13            14               15               16               17                 18               19               20               21               22               23               24                 25               26               27               28               29               30                 Date Details   11/09 This INR check       Date of next INR:  11/13/2018         How to take your warfarin dose     To take:  2.5 mg Take 0.5 of a 5 mg tablet.    To take:  5 mg Take 1 of the 5 mg tablets.

## 2018-11-09 NOTE — PROGRESS NOTES
ANTICOAGULATION FOLLOW-UP CLINIC VISIT    Patient Name:  Linwood Chi  Date:  11/9/2018  Contact Type:  Telephone/ [phone]    SUBJECTIVE:     Patient Findings     Positives No Problem Findings    Comments S/O:  Coleen from [facility] care calls with patients INR today of 2.5.  Linwood Chi is on Coumadin for A. Fib.  Current Coumadin dose is 5 mg Mon and 2.5 mg ROW.   Concerns today are: None Noted.    A/P: Linwood Chi's INR is Therapeutic  for his/her goal range of 2 - 3.  Reasons why INR is out of range may include: na  Recommended to have Linwood Chi remain on the same Coumadin dose and to have his/her INR rechecked in 1 week on 1/13.      Layla Garcia RN [facility]             OBJECTIVE    INR   Date Value Ref Range Status   11/09/2018 2.5 0.9 - 1.1t Final       ASSESSMENT / PLAN  No question data found.  Anticoagulation Summary as of 11/9/2018     INR goal 2.0-3.0   Today's INR 2.5   Warfarin maintenance plan 5 mg (5 mg x 1) on Mon; 2.5 mg (5 mg x 0.5) all other days   Full warfarin instructions 5 mg on Mon; 2.5 mg all other days   Weekly warfarin total 20 mg   No change documented Layla Garcia RN   Plan last modified Nannette Muhammad RN (4/11/2016)   Next INR check 11/13/2018   Priority INR   Target end date Indefinite    Indications   Long-term (current) use of anticoagulants [Z79.01] [Z79.01]  Atrial fibrillation (HCC) [I48.91] [I48.91]         Anticoagulation Episode Summary     INR check location     Preferred lab     Send INR reminders to [facility] CL[facility]IC    Comments       Anticoagulation Care Providers     Provider Role Specialty Phone number    Bernie Amaro MD [facility] [phone]            See the Encounter Report to view Anticoagulation Flowsheet and Dosing Calendar (Go to Encounters tab in chart review, and find the Anticoagulation Therapy Visit)    Dosage adjustment made based on physician directed care plan.    Layla Garcia RN

## 2018-11-12 ENCOUNTER — MEDICAL CORRESPONDENCE (OUTPATIENT)
Dept: HEALTH INFORMATION MANAGEMENT | Facility: CLINIC | Age: 83
End: 2018-11-12

## 2018-11-13 ENCOUNTER — ANTICOAGULATION THERAPY VISIT (OUTPATIENT)
Dept: NURSING | Facility: CLINIC | Age: 83
End: 2018-11-13
Payer: COMMERCIAL

## 2018-11-13 DIAGNOSIS — I48.91 ATRIAL FIBRILLATION (H): ICD-10-CM

## 2018-11-13 LAB — INR PPP: 2.5

## 2018-11-13 NOTE — PROGRESS NOTES
ANTICOAGULATION FOLLOW-UP CLINIC VISIT    Patient Name:  Linwood Chi  Date:  11/13/2018  Contact Type:  Telephone    SUBJECTIVE:     Patient Findings     Positives No Problem Findings    Comments S/O:  Raven DAWSON from [facility] calls with patients INR today of 2.5.  Linwood Chi is on Coumadin for A. Fib.  Current Coumadin dose is 5mg M, 2.5mg ROW.   Concerns today are: None Noted.    A/P: Linwood Chi's INR is Therapeutic  for his/her goal range of 2 - 3.  Reasons why INR is out of range may include: NA  Recommended to have Linwood Chi remain on the same Coumadin dose and to have his/her INR rechecked in 1 week on 11/20/18.    Brianne Gibbs RN  [facility]             OBJECTIVE    INR   Date Value Ref Range Status   11/13/2018 2.5  Final       ASSESSMENT / PLAN  INR assessment THER    Recheck INR In: 1 WEEK    INR Location Homecare INR      Anticoagulation Summary as of 11/13/2018     INR goal 2.0-3.0   Today's INR 2.5   Warfarin maintenance plan 5 mg (5 mg x 1) on Mon; 2.5 mg (5 mg x 0.5) all other days   Full warfarin instructions 5 mg on Mon; 2.5 mg all other days   Weekly warfarin total 20 mg   No change documented Brianne Gibbs RN   Plan last modified Nannette Muhammad RN (4/11/2016)   Next INR check 11/20/2018   Priority INR   Target end date Indefinite    Indications   Long-term (current) use of anticoagulants [Z79.01] [Z79.01]  Atrial fibrillation (HCC) [I48.91] [I48.91]         Anticoagulation Episode Summary     INR check location     Preferred lab     Send INR reminders to [facility] CL[facility]    Comments       Anticoagulation Care Providers     Provider Role Specialty Phone number    Bernie Amaro MD [facility] [phone]            See the Encounter Report to view Anticoagulation Flowsheet and Dosing Calendar (Go to Encounters tab in chart review, and find the Anticoagulation Therapy Visit)    Dosage adjustment made based  on physician directed care plan.    Brianne Gibbs RN

## 2018-11-13 NOTE — MR AVS SNAPSHOT
Linwood Chi   11/13/2018   Anticoagulation Therapy Visit    Description:  88 year old male   Provider:  Bernie Amaro MD   Department:  [facility] Nurse           INR as of 11/13/2018     Today's INR 2.5      Anticoagulation Summary as of 11/13/2018     INR goal 2.0-3.0   Today's INR 2.5   Full warfarin instructions 5 mg on Mon; 2.5 mg all other days   Next INR check 11/20/2018    Indications   Long-term (current) use of anticoagulants [Z79.01] [Z79.01]  Atrial fibrillation (HCC) [I48.91] [I48.91]         Your next Anticoagulation Clinic appointment(s)     Dec 03, 2018  2:00 PM CST   Anticoagulation Visit with SILVA ANTI COAG   [facility] ([address] [address] [phone]              Contact Numbers     [facility]  Please call [phone] to cancel and/or reschedule your appointment   Please call [phone] with any problems or questions regarding your therapy.        November 2018 Details    Sun Mon Tue Wed Thu Fri Sat         1               2               3                 4               5               6               7               8               9               10                 11               12               13      2.5 mg   See details      14      2.5 mg         15      2.5 mg         16      2.5 mg         17      2.5 mg           18      2.5 mg         19      5 mg         20            21               22               23               24                 25               26               27               28               29               30                 Date Details   11/13 This INR check       Date of next INR:  11/20/2018         How to take your warfarin dose     To take:  2.5 mg Take 0.5 of a 5 mg tablet.    To take:  5 mg Take 1 of the 5 mg tablets.

## 2018-11-14 ENCOUNTER — MEDICAL CORRESPONDENCE (OUTPATIENT)
Dept: HEALTH INFORMATION MANAGEMENT | Facility: CLINIC | Age: 83
End: 2018-11-14

## 2018-11-14 ENCOUNTER — TELEPHONE (OUTPATIENT)
Dept: FAMILY MEDICINE | Facility: CLINIC | Age: 83
End: 2018-11-14

## 2018-11-14 NOTE — TELEPHONE ENCOUNTER
Reason for call:  Patient reporting a symptom    Symptom or request: low heart rate. Resting 44 beats per minute. No symptoms    Duration (how long have symptoms been present): today    Have you been treated for this before? ?    Additional comments: Caller is reporting due to out of perimeters. Call patient at cell number [phone] if any questions. Thank you.    Phone Number patient can be reached at:  Other phone number:  [phone]     Best Time:  any    Can we leave a detailed message on this number:  NO    Call taken on 11/14/2018 at 11:41 AM by Marisela Vizcaino

## 2018-11-14 NOTE — TELEPHONE ENCOUNTER
Called and spoke with Nannette from [facility].   States she took patient's pulse manually due to irregularity after pt was resting for awhile and was 44 bpm.   States she looked through home care records and there was another day where pulse was 40 bpm.   States she asked patient if he was having any dizziness/lightheadedness, chest pain and patient denied any symptoms.   Pt has appt on 11/20/2018 for hospital f/u.     Routing to provider JAMES.     Birgit Morales RN

## 2018-11-15 ENCOUNTER — MEDICAL CORRESPONDENCE (OUTPATIENT)
Dept: HEALTH INFORMATION MANAGEMENT | Facility: CLINIC | Age: 83
End: 2018-11-15

## 2018-11-16 NOTE — PROGRESS NOTES
SUBJECTIVE:   Linwood Chi is a 88 year old male who presents to clinic today for the following health issues:      Diabetes Follow-up      Patient is checking blood sugars: not at all    Diabetic concerns: None     Symptoms of hypoglycemia (low blood sugar): none     Paresthesias (numbness or burning in feet) or sores: No, tingling      Date of last diabetic eye exam: appointment coming up next week     Diabetes Management Resources    Hyperlipidemia Follow-Up      Rate your low fat/cholesterol diet?: good    Taking statin?  Yes, no muscle aches from statin    Other lipid medications/supplements?:  none    Hypertension Follow-up      Outpatient blood pressures are being checked at nurse come to check at his house.  Results are varying all over.    Low Salt Diet: low salt    BP Readings from Last 2 Encounters:   11/20/18 128/64   05/08/18 120/60     Hemoglobin A1C (%)   Date Value   05/08/2018 6.3 (H)   02/12/2018 6.3 (H)     LDL Cholesterol Calculated (mg/dL)   Date Value   02/12/2018 59   12/26/2016 76       Amount of exercise or physical activity: None    Problems taking medications regularly: No    Medication side effects: none    Diet: low salt          Hospital Follow-up Visit:    Hospital/Nursing Home/IP Rehab Facility: [facility]  Date of Admission: 10/30/2018  Date of Discharge: 11/03/2018  Reason(s) for Admission: rectal bleeding             Problems taking medications regularly:  None       Medication changes since discharge: None       Problems adhering to non-medication therapy:  None    Summary of hospitalization:  CareEverywhere information obtained and reviewed  See outside records, reviewed and scanned  Diagnostic Tests/Treatments reviewed.  Follow up needed: none  Other Healthcare Providers Involved in Patient s Care:         None  Update since discharge: improved.     Post Discharge Medication Reconciliation: discharge medications reconciled, continue medications without  change.  Plan of care communicated with patient     Coding guidelines for this visit:  Type of Medical   Decision Making Face-to-Face Visit       within 7 Days of discharge Face-to-Face Visit        within 14 days of discharge   Moderate Complexity 94972 [id number]   High Complexity [id number]              Problem list and histories reviewed & adjusted, as indicated.  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     DM (diabetes mellitus), type 2 with renal complications (H)     A-fib (H)     Osteoarthritis of left knee     Onychomycosis     Long-term (current) use of anticoagulants [Z79.01]     Atrial fibrillation (HCC) [I48.91]     Benign essential hypertension     Gastroesophageal reflux disease without esophagitis     Type 2 diabetes mellitus with diabetic nephropathy, without long-term current use of insulin (H)     Past Surgical History:   Procedure Laterality Date     BYPASS GRAFT ARTERY CORONARY  2004    3 vessel 2004     OPEN REDUCTION INTERNAL FIXATION PATELLA         Social History   Substance Use Topics     Smoking status: Former Smoker     Years: 50.00     Types: Cigarettes     Quit date: 3/4/1992     Smokeless tobacco: Former User     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  3     atorvastatin (LIPITOR) 40 MG tablet TAKE 1 TABLET(40 MG) BY MOUTH DAILY 90 tablet 4     dorzolamide-timolol (COSOPT) 2-0.5 % ophthalmic solution Place 1 drop Into the left eye 2 times daily  6     latanoprost (XALATAN) 0.005 % ophthalmic solution Place 1 drop Into the left eye daily  3     lisinopril (PRINIVIL/ZESTRIL) 20 MG tablet TAKE 1 TABLET(20 MG) BY MOUTH DAILY 90 tablet 4     metFORMIN (GLUCOPHAGE) 500 MG tablet TAKE 1 TABLET BY MOUTH TWICE DAILY WITH MEALS 180 tablet 4     metoprolol tartrate (LOPRESSOR) 25 MG tablet Take one a day 90 tablet 4     omeprazole (PRILOSEC) 20 MG CR capsule TAKE 1 CAPSULE BY MOUTH EVERY DAY 30 TO 60 MINUTES BEFORE A MEAL 90 capsule 4     order for DME Equipment being ordered: Mild compression hose 1 each 0     warfarin (COUMADIN) 5 MG tablet TAKE ONE-HALF TABLET BY MOUTH ON DAILY EXCEPT TAKE 1 TABLET DAILY ON MONDAYS 136 tablet 0     [DISCONTINUED] warfarin (COUMADIN) 5 MG tablet TAKE ONE-HALF TABLET BY MOUTH ON DAILY EXCEPT TAKE 1 TABLET DAILY ON MONDAYS (Patient not taking: Reported on 11/20/2018) 136 tablet 0     Allergies   Allergen Reactions     Crestor [Rosuvastatin] Cramps     Penicillins      Swelling      Recent Labs   Lab Test  11/20/18   1042  05/08/18   1528  02/12/18   1406  12/26/16   0929  07/22/16   1309  12/31/15   1356   08/31/15   1546   01/15/15   1418   A1C  6.3*  6.3*  6.3*  6.3*  6.3*   --    < >  6.1*   --   6.4*   LDL   --    --   59  76   --   71   --    --    < >   --    HDL   --    --   48  49   --   41   --    --    < >   --    TRIG   --    --   132  135   --   116   --    --    < >   --    ALT   --    --   30  23   --    --    --   25   --    --    CR   --   1.19  1.10  1.51*  1.27*  1.17   --   1.17   --   1.31*   GFRESTIMATED   --   58*  63  44*  54*  59*   --   59*   --   52*   GFRESTBLACK   --   70  76  53*  65  72   --   72   --   63   POTASSIUM   --   4.4  4.9  4.4   "4.7  4.6   --   4.7   --   4.5   TSH   --    --    --    --   1.53   --    --    --    --   1.75    < > = values in this interval not displayed.      BP Readings from Last 3 Encounters:   11/20/18 128/64   05/08/18 120/60   02/12/18 132/70    Wt Readings from Last 3 Encounters:   11/20/18 188 lb 8 oz (85.5 kg)   05/08/18 188 lb 8 oz (85.5 kg)   02/12/18 191 lb (86.6 kg)                  Labs reviewed in EPIC    Reviewed and updated as needed this visit by clinical staff       Reviewed and updated as needed this visit by Provider         ROS:  This 88 year old male is here today in follow up of being in hospital for rectal bleeding. He drove himself to the hospital on 11/13/18. He is on daily coumadin and INR was 2.5 on admission. He underwent colonoscopy and was told that he needed another colonoscopy in 3 years due to polyps. He eats a healthy diet with protein. He lives alone. His pulse went down to 40 while  In the hospital. He was asymptomatic. I was given request to recheck to make sure his pulse was in the normal range after discharge. He is walking with a crutch today because his knees and hips are painful. Would like handicapped parking. All other review of systems are negative  Personal, family, and social history reviewed with patient and revised.    CONSTITUTIONAL: NEGATIVE for fever, chills, change in weight  ENT/MOUTH: NEGATIVE for ear, mouth and throat problems  RESP: NEGATIVE for significant cough or SOB  CV: NEGATIVE for chest pain, palpitations or peripheral edema    OBJECTIVE:     /64  Pulse 63  Temp 97.4  F (36.3  C) (Oral)  Resp 22  Ht 5' 6\" (1.676 m)  Wt 188 lb 8 oz (85.5 kg)  SpO2 96%  BMI 30.42 kg/m2  Body mass index is 30.42 kg/(m^2).  GENERAL: healthy, alert and no distress  NECK: no adenopathy, no asymmetry, masses, or scars and thyroid normal to palpation  RESP: lungs clear to auscultation - no rales, rhonchi or wheezes  CV: regular rate and rhythm, normal S1 S2, no S3 or S4, " no murmur, click or rub, no peripheral edema and peripheral pulses strong  ABDOMEN: soft, nontender, no hepatosplenomegaly, no masses and bowel sounds normal  MS: no gross musculoskeletal defects noted, no edema, but he walks slowly with a limp and hangs on to his right crutch due to knee pain   Well hydrated  Well nourished  Well groomed  Alert and oriented X 3  Good spirits    Diagnostic Test Results:  Results for orders placed or performed in visit on 11/20/18 (from the past 24 hour(s))   Hemoglobin A1c   Result Value Ref Range    Hemoglobin A1C 6.3 (H) 0 - 5.6 %   CBC with platelets differential   Result Value Ref Range    WBC 6.9 4.0 - 11.0 10e9/L    RBC Count 4.04 (L) 4.4 - 5.9 10e12/L    Hemoglobin 12.3 (L) 13.3 - 17.7 g/dL    Hematocrit 37.4 (L) 40.0 - 53.0 %    MCV 93 78 - 100 fl    MCH 30.4 26.5 - 33.0 pg    MCHC 32.9 31.5 - 36.5 g/dL    RDW 16.2 (H) 10.0 - 15.0 %    Platelet Count 293 150 - 450 10e9/L    % Neutrophils 63.4 %    % Lymphocytes 24.2 %    % Monocytes 9.7 %    % Eosinophils 2.3 %    % Basophils 0.4 %    Absolute Neutrophil 4.4 1.6 - 8.3 10e9/L    Absolute Lymphocytes 1.7 0.8 - 5.3 10e9/L    Absolute Monocytes 0.7 0.0 - 1.3 10e9/L    Absolute Eosinophils 0.2 0.0 - 0.7 10e9/L    Absolute Basophils 0.0 0.0 - 0.2 10e9/L    Diff Method Automated Method        ASSESSMENT/PLAN:              1. Type 2 diabetes mellitus with diabetic nephropathy, without long-term current use of insulin (H)  Good control   - TSH with free T4 reflex  - Basic metabolic panel  - Hemoglobin A1c    2. Rectal bleeding  Stable   - CBC with platelets differential    3. CKD (chronic kidney disease) stage 3, GFR 30-59 ml/min (H)  Stable   - Basic metabolic panel    4. Chronic atrial fibrillation (H)  Good control   - INR CLINIC REFERRAL    5. Arthralgia of both knees  Handicapped parking form completed     6. Benign essential hypertension  Good control and his pulse is in normal range also given that he is on lopressor.   -  Basic metabolic panel    Return to clinic 6 months for diabetes check     LORENZO DONAHUE MD  [facility]

## 2018-11-19 ENCOUNTER — ANTICOAGULATION THERAPY VISIT (OUTPATIENT)
Dept: NURSING | Facility: CLINIC | Age: 83
End: 2018-11-19
Payer: COMMERCIAL

## 2018-11-19 DIAGNOSIS — I48.91 ATRIAL FIBRILLATION (H): ICD-10-CM

## 2018-11-19 LAB — INR PPP: 2 (ref 0.8–1.1)

## 2018-11-19 NOTE — PROGRESS NOTES
ANTICOAGULATION FOLLOW-UP CLINIC VISIT    Patient Name:  Linwood Chi  Date:  11/19/2018  Contact Type:  Face to Face    SUBJECTIVE:     Patient Findings     Positives No Problem Findings    Comments S/O:  Raven from [facility] calls with patients INR today of 2.0.  Linwood Chi is on Coumadin for A. Fib.  Current Coumadin dose is 5 mg Mon and 2.5 mg ROW.   Concerns today are: None Noted.    A/P: Linwood Chi's INR is Therapeutic  for his/her goal range of 2 - 3.  Reasons why INR is out of range may include: na  Recommended to have Linwood Chi remain on the same Coumadin dose and to have his/her INR rechecked in 1 week on 11/26.      Layla Garcia RN BC             OBJECTIVE    INR   Date Value Ref Range Status   11/19/2018 2.0 (A) 0.8 - 1.1 Final       ASSESSMENT / PLAN  No question data found.  Anticoagulation Summary as of 11/19/2018     INR goal 2.0-3.0   Today's INR 2.0   Warfarin maintenance plan 5 mg (5 mg x 1) on Mon; 2.5 mg (5 mg x 0.5) all other days   Full warfarin instructions 5 mg on Mon; 2.5 mg all other days   Weekly warfarin total 20 mg   No change documented Layla Garcia RN   Plan last modified Nannette Muhammad RN (4/11/2016)   Next INR check 11/26/2018   Priority INR   Target end date Indefinite    Indications   Long-term (current) use of anticoagulants [Z79.01] [Z79.01]  Atrial fibrillation (HCC) [I48.91] [I48.91]         Anticoagulation Episode Summary     INR check location     Preferred lab     Send INR reminders to [facility]    Comments       Anticoagulation Care Providers     Provider Role Specialty Phone number    Bernie Amaro MD [facility] [phone]            See the Encounter Report to view Anticoagulation Flowsheet and Dosing Calendar (Go to Encounters tab in chart review, and find the Anticoagulation Therapy Visit)    Dosage adjustment made based on physician directed care plan.    Layla Garcia RN

## 2018-11-19 NOTE — MR AVS SNAPSHOT
Linwood Chi   11/19/2018   Anticoagulation Therapy Visit    Description:  88 year old male   Provider:  Bernie Amaro MD   Department:  [facility]           INR as of 11/19/2018     Today's INR 2.0      Anticoagulation Summary as of 11/19/2018     INR goal 2.0-3.0   Today's INR 2.0   Full warfarin instructions 5 mg on Mon; 2.5 mg all other days   Next INR check 11/26/2018    Indications   Long-term (current) use of anticoagulants [Z79.01] [Z79.01]  Atrial fibrillation (HCC) [I48.91] [I48.91]         Your next Anticoagulation Clinic appointment(s)     Dec 03, 2018  2:00 PM CST   Anticoagulation Visit with SILVA ANTI COAG   [facility] ([address] [address] [phone]              Contact Numbers     [facility]  Please call [phone] to cancel and/or reschedule your appointment   Please call [phone] with any problems or questions regarding your therapy.        November 2018 Details    Sun Mon Tue Wed Thu Fri Sat         1               2               3                 4               5               6               7               8               9               10                 11               12               13               14               15               16               17                 18               19      5 mg   See details      20      2.5 mg         21      2.5 mg         22      2.5 mg         23      2.5 mg         24      2.5 mg           25      2.5 mg         26            27               28               29               30                 Date Details   11/19 This INR check       Date of next INR:  11/26/2018         How to take your warfarin dose     To take:  2.5 mg Take 0.5 of a 5 mg tablet.    To take:  5 mg Take 1 of the 5 mg tablets.

## 2018-11-20 ENCOUNTER — OFFICE VISIT (OUTPATIENT)
Dept: FAMILY MEDICINE | Facility: CLINIC | Age: 83
End: 2018-11-20
Payer: COMMERCIAL

## 2018-11-20 VITALS
SYSTOLIC BLOOD PRESSURE: 128 MMHG | BODY MASS INDEX: 30.29 KG/M2 | HEART RATE: 63 BPM | OXYGEN SATURATION: 96 % | TEMPERATURE: 97.4 F | HEIGHT: 66 IN | RESPIRATION RATE: 22 BRPM | DIASTOLIC BLOOD PRESSURE: 64 MMHG | WEIGHT: 188.5 LBS

## 2018-11-20 DIAGNOSIS — K62.5 RECTAL BLEEDING: ICD-10-CM

## 2018-11-20 DIAGNOSIS — E11.21 TYPE 2 DIABETES MELLITUS WITH DIABETIC NEPHROPATHY, WITHOUT LONG-TERM CURRENT USE OF INSULIN (H): Primary | ICD-10-CM

## 2018-11-20 DIAGNOSIS — I10 BENIGN ESSENTIAL HYPERTENSION: ICD-10-CM

## 2018-11-20 DIAGNOSIS — N18.30 CKD (CHRONIC KIDNEY DISEASE) STAGE 3, GFR 30-59 ML/MIN (H): ICD-10-CM

## 2018-11-20 DIAGNOSIS — I48.20 CHRONIC ATRIAL FIBRILLATION (H): ICD-10-CM

## 2018-11-20 DIAGNOSIS — M25.562 ARTHRALGIA OF BOTH KNEES: ICD-10-CM

## 2018-11-20 DIAGNOSIS — M25.561 ARTHRALGIA OF BOTH KNEES: ICD-10-CM

## 2018-11-20 LAB
ANION GAP SERPL CALCULATED.3IONS-SCNC: 6 MMOL/L (ref 3–14)
BASOPHILS # BLD AUTO: 0 10E9/L (ref 0–0.2)
BASOPHILS NFR BLD AUTO: 0.4 %
BUN SERPL-MCNC: 20 MG/DL (ref 7–30)
CALCIUM SERPL-MCNC: 8.7 MG/DL (ref 8.5–10.1)
CHLORIDE SERPL-SCNC: 102 MMOL/L (ref 94–109)
CO2 SERPL-SCNC: 27 MMOL/L (ref 20–32)
CREAT SERPL-MCNC: 1.24 MG/DL (ref 0.66–1.25)
DIFFERENTIAL METHOD BLD: ABNORMAL
EOSINOPHIL # BLD AUTO: 0.2 10E9/L (ref 0–0.7)
EOSINOPHIL NFR BLD AUTO: 2.3 %
ERYTHROCYTE [DISTWIDTH] IN BLOOD BY AUTOMATED COUNT: 16.2 % (ref 10–15)
GFR SERPL CREATININE-BSD FRML MDRD: 55 ML/MIN/1.7M2
GLUCOSE SERPL-MCNC: 146 MG/DL (ref 70–99)
HBA1C MFR BLD: 6.3 % (ref 0–5.6)
HCT VFR BLD AUTO: 37.4 % (ref 40–53)
HGB BLD-MCNC: 12.3 G/DL (ref 13.3–17.7)
LYMPHOCYTES # BLD AUTO: 1.7 10E9/L (ref 0.8–5.3)
LYMPHOCYTES NFR BLD AUTO: 24.2 %
MCH RBC QN AUTO: 30.4 PG (ref 26.5–33)
MCHC RBC AUTO-ENTMCNC: 32.9 G/DL (ref 31.5–36.5)
MCV RBC AUTO: 93 FL (ref 78–100)
MONOCYTES # BLD AUTO: 0.7 10E9/L (ref 0–1.3)
MONOCYTES NFR BLD AUTO: 9.7 %
NEUTROPHILS # BLD AUTO: 4.4 10E9/L (ref 1.6–8.3)
NEUTROPHILS NFR BLD AUTO: 63.4 %
PLATELET # BLD AUTO: 293 10E9/L (ref 150–450)
POTASSIUM SERPL-SCNC: 4.2 MMOL/L (ref 3.4–5.3)
RBC # BLD AUTO: 4.04 10E12/L (ref 4.4–5.9)
SODIUM SERPL-SCNC: 135 MMOL/L (ref 133–144)
TSH SERPL DL<=0.005 MIU/L-ACNC: 1.49 MU/L (ref 0.4–4)
WBC # BLD AUTO: 6.9 10E9/L (ref 4–11)

## 2018-11-20 PROCEDURE — 85025 COMPLETE CBC W/AUTO DIFF WBC: CPT | Performed by: FAMILY MEDICINE

## 2018-11-20 PROCEDURE — 84443 ASSAY THYROID STIM HORMONE: CPT | Performed by: FAMILY MEDICINE

## 2018-11-20 PROCEDURE — 36415 COLL VENOUS BLD VENIPUNCTURE: CPT | Performed by: FAMILY MEDICINE

## 2018-11-20 PROCEDURE — 83036 HEMOGLOBIN GLYCOSYLATED A1C: CPT | Performed by: FAMILY MEDICINE

## 2018-11-20 PROCEDURE — 80048 BASIC METABOLIC PNL TOTAL CA: CPT | Performed by: FAMILY MEDICINE

## 2018-11-20 PROCEDURE — 99214 OFFICE O/P EST MOD 30 MIN: CPT | Performed by: FAMILY MEDICINE

## 2018-11-20 ASSESSMENT — PAIN SCALES - GENERAL: PAINLEVEL: MODERATE PAIN (4)

## 2018-11-20 NOTE — LETTER
"My Heart Failure Action Plan   Name: Linwood Chi    YOB: 1930   Date: 11/15/2018    My doctor: Bernie Amaro     [address]  [place] [id number] [phone]  My Diagnosis: {HF TYPE:109277::\"Systolic Heart Failure\"}   My Ejection Fraction: {EF%:477629}    My Exercise Goal: 30 minutes daily  .     My Weight Goal: ***  Wt Readings from Last 2 Encounters:   05/08/18 188 lb 8 oz (85.5 kg)   02/12/18 191 lb (86.6 kg)     Weigh yourself daily using the same scale. If you gain more than 2 pounds in 24 hours or 5 pounds in a week {HF WEIGHT GOAL:320067}    My Diet Goal: {HF DIET GOAL:122706::\"No added salt\"}    Emergency Room Visits:    Our goal is to improve your quality of life and help you avoid a visit to the emergency room or hospital.  If we work together, we can achieve this goal. But, if you feel you need to call 911 or go to the emergency room, please do so.  If you go to the emergency room, please bring your list of medicines and your daily weight chart with you.       GREEN ZONE     Doing well today    Weight gained is no more than 2 pounds a day or 5 pounds a week.    No swelling in feet, ankles, legs or stomach.    No more swelling than usual.    No more trouble breathing than usual.    No change in my sleep.    No other problems. Actions:    I am doing fine.  I will take my medicine, follow my diet, see my doctor, exercise, and watch for symptoms.           YELLOW ZONE         Having a bad day or flare up    Weight gain of more than 2 pounds in one day or 5 pounds in one week.    New swelling in ankle, leg, knee or thigh.    Bloating in belly, pants feel tighter.    Swelling in hands or face.    Coughing or trouble breathing while walking or talking.    Harder to breathe last night.    Have trouble sleeping, wake up short of breath.    Much more tired than usual.    Not eating.    Pain in my chest or bad leg cramps.    Feel weak or dizzy. " Actions:    I need to take action and call my doctor or nurse today.                 RED ZONE         Need medical care now    Weight gain of 5 pounds overnight.    Chest pain or pressure that does not go away.    Feel less alert.    Wheezing or have trouble breathing when at rest.    Cannot sleep lying down.    Cannot take my water pill.    Pass out or faint. Actions:    I need to call my doctor or nurse now!    Call 911 if I have chest pain or cannot breathe.

## 2018-11-20 NOTE — PATIENT INSTRUCTIONS
[facility]    If you have any questions regarding to your visit please contact your care team:       Team Purple:   Clinic Hours Telephone Number   Dr. Bernie Salcido   7am-7pm  Monday - Thursday   7am-5pm  Fridays  [phone]  (Appointment scheduling available 24/7)   Urgent Care - [place] and [facility] - 11am-9pm Monday-Friday Saturday-Sunday- 9am-5pm   [place] - 5pm-9pm Monday-Friday Saturday-Sunday- 9am-5pm  [phone] - [facility]  [phone] - [place]       What options do I have for a visit other than an office visit? We offer electronic visits (e-visits) and telephone visits, when medically appropriate.  Please check with your medical insurance to see if these types of visits are covered, as you will be responsible for any charges that are not paid by your insurance.      You can use [facility] (secure electronic communication) to access to your chart, send your primary care provider a message, or make an appointment. Ask a team member how to get started.     For a price quote for your services, please call our Consumer Price Line at [phone] or our Imaging Cost estimation line at [phone] (for imaging tests).

## 2018-11-20 NOTE — MR AVS SNAPSHOT
After Visit Summary   11/20/2018    Linwood Chi    MRN: [id number]           Patient Information     Date Of Birth          5/27/1930        Visit Information        Provider Department      11/20/2018 10:30 AM Bernie Amaro MD [facility]        Today's Diagnoses     Type 2 diabetes mellitus with diabetic nephropathy, without long-term current use of insulin (H)    -  1    Rectal bleeding        Benign essential hypertension        CKD (chronic kidney disease) stage 3, GFR 30-59 ml/min (H)        Chronic atrial fibrillation (H)          Care Instructions    [facility]    If you have any questions regarding to your visit please contact your care team:       Team Purple:   Clinic Hours Telephone Number   Dr. Bernie Salcido   7am-7pm  Monday - Thursday   7am-5pm  Fridays  [phone]  (Appointment scheduling available 24/7)   Urgent Care - [place] and [facility] - 11am-9pm Monday-Friday Saturday-Sunday- 9am-5pm   [place] - 5pm-9pm Monday-Friday Saturday-Sunday- 9am-5pm  [phone] - [place]  [phone] - [place]       What options do I have for a visit other than an office visit? We offer electronic visits (e-visits) and telephone visits, when medically appropriate.  Please check with your medical insurance to see if these types of visits are covered, as you will be responsible for any charges that are not paid by your insurance.      You can use [employer] (secure electronic communication) to access to your chart, send your primary care provider a message, or make an appointment. Ask a team member how to get started.     For a price quote for your services, please call our Consumer Price Line at [phone] or our Imaging Cost estimation line at [phone] (for imaging tests).              Follow-ups after your visit        Additional Services     INR CLINIC REFERRAL       Your provider has  "referred you to INR Services.    Please be aware that coverage of these services is subject to the terms and limitations of your health insurance plan.  Call member services at your health plan with any benefit or coverage questions.    Indication for Anticoagulation: Atrial Fibrillation  If nonstandard INR is desired, indicate goal range and explanation:    Expected Duration of Therapy: Lifetime                  Your next 10 appointments already scheduled     Dec 03, 2018  2:00 PM CST   Anticoagulation Visit with SILVA ANTI COAG   [facility] [place] ([facility])    [address]  [place] [address] [phone]              Who to contact     If you have questions or need follow up information about today's clinic visit or your schedule please contact [facility] directly at [phone].  Normal or non-critical lab and imaging results will be communicated to you by MyChart, letter or phone within 4 business days after the clinic has received the results. If you do not hear from us within 7 days, please contact the clinic through MyChart or phone. If you have a critical or abnormal lab result, we will notify you by phone as soon as possible.  Submit refill requests through [employer] or call your pharmacy and they will forward the refill request to us. Please allow 3 business days for your refill to be completed.          Additional Information About Your Visit        Care EveryWhere ID     This is your Care EveryWhere ID. This could be used by other organizations to access your [facility] medical records  [id number]        Your Vitals Were     Pulse Temperature Respirations Height Pulse Oximetry BMI (Body Mass Index)    63 97.4  F (36.3  C) (Oral) 22 5' 6\" (1.676 m) 96% 30.42 kg/m2       Blood Pressure from Last 3 Encounters:   11/20/18 128/64   05/08/18 120/60   02/12/18 132/70    Weight from Last 3 Encounters:   11/20/18 188 lb 8 oz (85.5 kg)   05/08/18 188 lb 8 oz (85.5 " kg)   02/12/18 191 lb (86.6 kg)              We Performed the Following     Basic metabolic panel     CBC with platelets differential     Hemoglobin A1c     INR CLINIC REFERRAL     TSH with free T4 reflex          Today's Medication Changes          These changes are accurate as of 11/20/18 11:06 AM.  If you have any questions, ask your nurse or doctor.               These medicines have changed or have updated prescriptions.        Dose/Directions    warfarin 5 MG tablet   Commonly known as:  COUMADIN   This may have changed:  Another medication with the same name was removed. Continue taking this medication, and follow the directions you see here.   Used for:  Atrial fibrillation, unspecified type (H)   Changed by:  Bernie Amaro MD        TAKE ONE-HALF TABLET BY MOUTH ON DAILY EXCEPT TAKE 1 TABLET DAILY ON MONDAYS   Quantity:  136 tablet   Refills:  0                Primary Care Provider Office Phone # Fax #    Bernie Amaro [phone] [address] [address]        Equal Access to Services     [facility] Hospital[facility] AH: Hadii waldo strickland hadasho Soomaali, waaxda luqadaha, qaybta kaalmada adeegyada, teresita bay . So [facility] [phone].    ATENCIÓN: Si habla español, tiene a khanna disposición servicios gratuitos de asistencia lingüística. Llame al [phone].    We comply with applicable federal civil rights laws and [place] laws. We do not discriminate on the basis of race, color, national origin, age, disability, sex, sexual orientation, or gender identity.            Thank you!     Thank you for choosing [facility]  for your care. Our goal is always to provide you with excellent care. Hearing back from our patients is one way we can continue to improve our services. Please take a few minutes to complete the written survey that you may receive in the mail after your visit with us. Thank you!             Your Updated  Medication List - Protect others around you: Learn how to safely use, store and throw away your medicines at www.disposemymeds.org.          This list is accurate as of 11/20/18 11:06 AM.  Always use your most recent med list.                   Brand Name Dispense Instructions for use Diagnosis    amLODIPine 5 MG tablet    NORVASC    90 tablet    TAKE 1 TABLET(5 MG) BY MOUTH DAILY    CKD (chronic kidney disease) stage 3, GFR 30-59 ml/min (H)       aspirin 81 MG tablet      Take 1 tablet by mouth daily.    Type 2 diabetes, HbA1c goal < 7% (H)       atorvastatin 40 MG tablet    LIPITOR    90 tablet    TAKE 1 TABLET(40 MG) BY MOUTH DAILY    Hyperlipidemia with target LDL less than 100, Benign essential hypertension       dorzolamide-timolol 2-0.5 % ophthalmic solution    COSOPT     Place 1 drop Into the left eye 2 times daily        latanoprost 0.005 % ophthalmic solution    XALATAN     Place 1 drop Into the left eye daily        lisinopril 20 MG tablet    PRINIVIL/ZESTRIL    90 tablet    TAKE 1 TABLET(20 MG) BY MOUTH DAILY    CKD (chronic kidney disease) stage 3, GFR 30-59 ml/min (H)       metFORMIN 500 MG tablet    GLUCOPHAGE    180 tablet    TAKE 1 TABLET BY MOUTH TWICE DAILY WITH MEALS    Type 2 diabetes mellitus with diabetic nephropathy, without long-term current use of insulin (H)       metoprolol tartrate 25 MG tablet    LOPRESSOR    90 tablet    Take one a day    Benign essential hypertension       omeprazole 20 MG CR capsule    priLOSEC    90 capsule    TAKE 1 CAPSULE BY MOUTH EVERY DAY 30 TO 60 MINUTES BEFORE A MEAL    Gastroesophageal reflux disease without esophagitis       order for DME     1 each    Equipment being ordered: Mild compression hose    Pain and swelling of left lower leg       warfarin 5 MG tablet    COUMADIN    136 tablet    TAKE ONE-HALF TABLET BY MOUTH ON DAILY EXCEPT TAKE 1 TABLET DAILY ON MONDAYS    Atrial fibrillation, unspecified type (H)

## 2018-11-20 NOTE — LETTER
[address] [place] [address]    November 20, 2018    Linwood Chi  [address] [address] [facility] [address]      Dear Linwood,    Your lab tests are looking good. Your hemoglobin is almost back to normal. Continue your healthy lifestyle.     Enclosed is a copy of your results.   Results for orders placed or performed in visit on 11/20/18   TSH with free T4 reflex   Result Value Ref Range    TSH 1.49 0.40 - 4.00 mU/L   Basic metabolic panel   Result Value Ref Range    Sodium 135 133 - 144 mmol/L    Potassium 4.2 3.4 - 5.3 mmol/L    Chloride 102 94 - 109 mmol/L    Carbon Dioxide 27 20 - 32 mmol/L    Anion Gap 6 3 - 14 mmol/L    Glucose 146 (H) 70 - 99 mg/dL    Urea Nitrogen 20 7 - 30 mg/dL    Creatinine 1.24 0.66 - 1.25 mg/dL    GFR Estimate 55 (L) >60 mL/min/1.7m2    GFR Estimate If Black 67 >60 mL/min/1.7m2    Calcium 8.7 8.5 - 10.1 mg/dL   Hemoglobin A1c   Result Value Ref Range    Hemoglobin A1C 6.3 (H) 0 - 5.6 %   CBC with platelets differential   Result Value Ref Range    WBC 6.9 4.0 - 11.0 10e9/L    RBC Count 4.04 (L) 4.4 - 5.9 10e12/L    Hemoglobin 12.3 (L) 13.3 - 17.7 g/dL    Hematocrit 37.4 (L) 40.0 - 53.0 %    MCV 93 78 - 100 fl    MCH 30.4 26.5 - 33.0 pg    MCHC 32.9 31.5 - 36.5 g/dL    RDW 16.2 (H) 10.0 - 15.0 %    Platelet Count 293 150 - 450 10e9/L    % Neutrophils 63.4 %    % Lymphocytes 24.2 %    % Monocytes 9.7 %    % Eosinophils 2.3 %    % Basophils 0.4 %    Absolute Neutrophil 4.4 1.6 - 8.3 10e9/L    Absolute Lymphocytes 1.7 0.8 - 5.3 10e9/L    Absolute Monocytes 0.7 0.0 - 1.3 10e9/L    Absolute Eosinophils 0.2 0.0 - 0.7 10e9/L    Absolute Basophils 0.0 0.0 - 0.2 10e9/L    Diff Method Automated Method        If you have any questions or concerns, please call myself or my nurse at [phone].    Sincerely,    Bernie Amaro MD/jose

## 2018-11-20 NOTE — Clinical Note
Patient had recent colonoscopy at [facility]. Seen in Care Everywhere. Please abstract to [employer]. Will need another colonoscopy in 3 years.   LORENZO DONAHUE M.D.

## 2018-11-26 ENCOUNTER — ANTICOAGULATION THERAPY VISIT (OUTPATIENT)
Dept: NURSING | Facility: CLINIC | Age: 83
End: 2018-11-26
Payer: COMMERCIAL

## 2018-11-26 DIAGNOSIS — I48.91 ATRIAL FIBRILLATION (H): ICD-10-CM

## 2018-11-26 LAB — INR PPP: 2.4 (ref 0.8–1.1)

## 2018-11-26 NOTE — MR AVS SNAPSHOT
Linwood Chi   11/26/2018   Anticoagulation Therapy Visit    Description:  88 year old male   Provider:  Bernie Amaro MD   Department:  [facility]           INR as of 11/26/2018     Today's INR 2.4      Anticoagulation Summary as of 11/26/2018     INR goal 2.0-3.0   Today's INR 2.4   Full warfarin instructions 5 mg on Mon; 2.5 mg all other days   Next INR check 12/3/2018    Indications   Long-term (current) use of anticoagulants [Z79.01] [Z79.01]  Atrial fibrillation (HCC) [I48.91] [I48.91]         Your next Anticoagulation Clinic appointment(s)     Dec 03, 2018  2:00 PM CST   Anticoagulation Visit with MIRANDA ANTI COAG   [facility] ([address] [address] [phone]              Contact Numbers     [facility]  Please call [phone] to cancel and/or reschedule your appointment   Please call [phone] with any problems or questions regarding your therapy.        November 2018 Details    Sun Mon Tue Wed Thu Fri Sat         1               2               3                 4               5               6               7               8               9               10                 11               12               13               14               15               16               17                 18               19               20               21               22               23               24                 25               26      5 mg   See details      27      2.5 mg         28      2.5 mg         29      2.5 mg         30      2.5 mg           Date Details   11/26 This INR check               How to take your warfarin dose     To take:  2.5 mg Take 0.5 of a 5 mg tablet.    To take:  5 mg Take 1 of the 5 mg tablets.           December 2018 Details    Sun Mon Tue Wed Thu Fri Sat           1      2.5 mg           2      2.5 mg         3            4               5               6               7               8                  9               10               11               12               13               14               15                 16               17               18               19               20               21               22                 23               24               25               26               27               28               29                 30               31                     Date Details   No additional details    Date of next INR:  12/3/2018         How to take your warfarin dose     To take:  2.5 mg Take 0.5 of a 5 mg tablet.    To take:  5 mg Take 1 of the 5 mg tablets.

## 2018-11-26 NOTE — PROGRESS NOTES
ANTICOAGULATION FOLLOW-UP CLINIC VISIT    Patient Name:  Linwood Chi  Date:  11/26/2018  Contact Type:  Telephone    SUBJECTIVE:     Patient Findings     Comments S/O:  Raven from  home care calls with patients INR today of 2.4.  Linwood Chi is on Coumadin for A. Fib.  Current Coumadin dose is 5 mg Mon and 2.5 mg ROW.   Concerns today are: None Noted.    A/P: Linwood Chi's INR is Therapeutic  for his/her goal range of 2 - 3.  Reasons why INR is out of range may include: na  Recommended to have Linwood Chi remain on the same Coumadin dose and to have his/her INR rechecked in 1 week on 12/3.      Layla Garcia RN BC             OBJECTIVE    INR   Date Value Ref Range Status   11/26/2018 2.4 (A) 0.8 - 1.1 Final       ASSESSMENT / PLAN  No question data found.  Anticoagulation Summary as of 11/26/2018     INR goal 2.0-3.0   Today's INR 2.4   Warfarin maintenance plan 5 mg (5 mg x 1) on Mon; 2.5 mg (5 mg x 0.5) all other days   Full warfarin instructions 5 mg on Mon; 2.5 mg all other days   Weekly warfarin total 20 mg   No change documented Layla Garcia RN   Plan last modified Nannette Muhammad RN (4/11/2016)   Next INR check 12/3/2018   Priority INR   Target end date Indefinite    Indications   Long-term (current) use of anticoagulants [Z79.01] [Z79.01]  Atrial fibrillation (HCC) [I48.91] [I48.91]         Anticoagulation Episode Summary     INR check location     Preferred lab     Send INR reminders to [facility] CL[facility]    Comments       Anticoagulation Care Providers     Provider Role Specialty Phone number    Bernie Amaro MD [facility] [phone]            See the Encounter Report to view Anticoagulation Flowsheet and Dosing Calendar (Go to Encounters tab in chart review, and find the Anticoagulation Therapy Visit)    Dosage adjustment made based on physician directed care plan.    Layla Garcia, SAMIR

## 2018-12-03 ENCOUNTER — ANTICOAGULATION THERAPY VISIT (OUTPATIENT)
Dept: NURSING | Facility: CLINIC | Age: 83
End: 2018-12-03
Payer: COMMERCIAL

## 2018-12-03 DIAGNOSIS — I48.91 ATRIAL FIBRILLATION (H): ICD-10-CM

## 2018-12-03 LAB — INR POINT OF CARE: 2.5 (ref 0.86–1.14)

## 2018-12-03 PROCEDURE — 99207 ZZC NO CHARGE NURSE ONLY: CPT

## 2018-12-03 PROCEDURE — 36416 COLLJ CAPILLARY BLOOD SPEC: CPT

## 2018-12-03 PROCEDURE — 85610 PROTHROMBIN TIME: CPT | Mod: QW

## 2018-12-03 NOTE — PROGRESS NOTES
ANTICOAGULATION FOLLOW-UP CLINIC VISIT    Patient Name:  Linwood Chi  Date:  12/3/2018  Contact Type:  Face to Face    SUBJECTIVE:     Patient Findings     Positives No Problem Findings    Comments Bleeding Signs/Symptoms: NO  Thromboembolic Signs/Symptoms: NO     Medication Changes:  NO  Dietary Changes:  NO  Bacterial/Viral Infection: NO     Missed Coumadin Doses: NO  Other Concerns:  NO             OBJECTIVE    INR Protime   Date Value Ref Range Status   12/03/2018 2.5 (A) 0.86 - 1.14 Final       ASSESSMENT / PLAN  INR assessment THER    Recheck INR In: 4 WEEKS    INR Location Clinic      Anticoagulation Summary as of 12/3/2018     INR goal 2.0-3.0   Today's INR 2.5   Warfarin maintenance plan 5 mg (5 mg x 1) on Mon; 2.5 mg (5 mg x 0.5) all other days   Full warfarin instructions 5 mg on Mon; 2.5 mg all other days   Weekly warfarin total 20 mg   No change documented Layla Garcia RN   Plan last modified Nannette Muhammad RN (4/11/2016)   Next INR check 12/31/2018   Priority INR   Target end date Indefinite    Indications   Long-term (current) use of anticoagulants [Z79.01] [Z79.01]  Atrial fibrillation (HCC) [I48.91] [I48.91]         Anticoagulation Episode Summary     INR check location     Preferred lab     Send INR reminders to [facility] CLINIC    Comments       Anticoagulation Care Providers     Provider Role Specialty Phone number    Bernie Amaro MD [facility] [phone]            See the Encounter Report to view Anticoagulation Flowsheet and Dosing Calendar (Go to Encounters tab in chart review, and find the Anticoagulation Therapy Visit)    Dosage adjustment made based on physician directed care plan.    Layla Garcia RN

## 2018-12-03 NOTE — MR AVS SNAPSHOT
Linwood Chi   12/3/2018 2:00 PM   Anticoagulation Therapy Visit    Description:  88 year old male   Provider:  MIRANDA ANTI COAG   Department:  [facility]           INR as of 12/3/2018     Today's INR 2.5      Anticoagulation Summary as of 12/3/2018     INR goal 2.0-3.0   Today's INR 2.5   Full warfarin instructions 5 mg on Mon; 2.5 mg all other days   Next INR check 12/31/2018    Indications   Long-term (current) use of anticoagulants [Z79.01] [Z79.01]  Atrial fibrillation (HCC) [I48.91] [I48.91]         Your next Anticoagulation Clinic appointment(s)     Dec 03, 2018  2:00 PM CST   Anticoagulation Visit with MIRANDA ANTI COAG   [facility] ([facility]    [address] [place] [address] [phone]            Dec 31, 2018 11:00 AM CST   Anticoagulation Visit with MIRANDA ANTI COAG   [facility] ([facility]    [address] [place] [address] [phone]              Contact Numbers     [facility]  Please call [phone] to cancel and/or reschedule your appointment   Please call [phone] with any problems or questions regarding your therapy.        December 2018 Details    Sun Mon Tue Wed Thu Fri Sat           1                 2               3      5 mg   See details      4      2.5 mg         5      2.5 mg         6      2.5 mg         7      2.5 mg         8      2.5 mg           9      2.5 mg         10      5 mg         11      2.5 mg         12      2.5 mg         13      2.5 mg         14      2.5 mg         15      2.5 mg           16      2.5 mg         17      5 mg         18      2.5 mg         19      2.5 mg         20      2.5 mg         21      2.5 mg         22      2.5 mg           23      2.5 mg         24      5 mg         25      2.5 mg         26      2.5 mg         27      2.5 mg         28      2.5 mg         29      2.5 mg           30      2.5 mg         31                  Date Details   12/03 This  INR check       Date of next INR:  12/31/2018         How to take your warfarin dose     To take:  2.5 mg Take 0.5 of a 5 mg tablet.    To take:  5 mg Take 1 of the 5 mg tablets.

## 2018-12-05 ENCOUNTER — TELEPHONE (OUTPATIENT)
Dept: FAMILY MEDICINE | Facility: CLINIC | Age: 83
End: 2018-12-05

## 2018-12-05 NOTE — TELEPHONE ENCOUNTER
Reason for Call:  Other call back    Detailed comments: Nannette calling. She saw him yesterday, his heart rate was 46 to 50 per minute. She is also discontinuing visits tomorrow.     No symptoms.     Phone Number Patient can be reached at: Other phone number:  [phone]    Best Time:  Any     Can we leave a detailed message on this number? YES    Call taken on 12/5/2018 at 2:49 PM by Genet Mix

## 2018-12-05 NOTE — TELEPHONE ENCOUNTER
Called and spoke with Nannette from [facility] PT/OT.   Advised her that patient should be seen for bradycardia. States she will let him know at tomorrow's appointment but stated it would be best for writer to call patient and let him know too and sometimes it can be difficult to get information across.     Called and spoke with patient and gave information.   Pt was hesitant but scheduled appt for 12/17/2018.  No further questions.     Birgit Morales RN

## 2018-12-06 ENCOUNTER — MEDICAL CORRESPONDENCE (OUTPATIENT)
Dept: HEALTH INFORMATION MANAGEMENT | Facility: CLINIC | Age: 83
End: 2018-12-06

## 2018-12-17 ENCOUNTER — OFFICE VISIT (OUTPATIENT)
Dept: FAMILY MEDICINE | Facility: CLINIC | Age: 83
End: 2018-12-17
Payer: COMMERCIAL

## 2018-12-17 VITALS
HEIGHT: 66 IN | TEMPERATURE: 97.3 F | HEART RATE: 70 BPM | RESPIRATION RATE: 22 BRPM | BODY MASS INDEX: 29.7 KG/M2 | SYSTOLIC BLOOD PRESSURE: 122 MMHG | DIASTOLIC BLOOD PRESSURE: 60 MMHG | WEIGHT: 184.8 LBS | OXYGEN SATURATION: 95 %

## 2018-12-17 DIAGNOSIS — I10 BENIGN ESSENTIAL HYPERTENSION: Primary | ICD-10-CM

## 2018-12-17 PROCEDURE — 99213 OFFICE O/P EST LOW 20 MIN: CPT | Performed by: FAMILY MEDICINE

## 2018-12-17 ASSESSMENT — MIFFLIN-ST. JEOR: SCORE: 1451

## 2018-12-17 NOTE — PROGRESS NOTES
SUBJECTIVE:   Linwood Chi is a 88 year old male who presents to clinic today for the following health issues:      Patient presents with:  Other: need to check for low pulse per PT           Problem list and histories reviewed & adjusted, as indicated.  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     DM (diabetes mellitus), type 2 with renal complications (H)     A-fib (H)     Osteoarthritis of left knee     Onychomycosis     Long-term (current) use of anticoagulants [Z79.01]     Atrial fibrillation (HCC) [I48.91]     Benign essential hypertension     Gastroesophageal reflux disease without esophagitis     Type 2 diabetes mellitus with diabetic nephropathy, without long-term current use of insulin (H)     Past Surgical History:   Procedure Laterality Date     BYPASS GRAFT ARTERY CORONARY      3 vessel      OPEN REDUCTION INTERNAL FIXATION PATELLA         Social History     Tobacco Use     Smoking status: Former Smoker     Years: 50.00     Types: Cigarettes     Last attempt to quit: 3/4/1992     Years since quittin.8     Smokeless tobacco: Former User   Substance Use Topics     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  3     atorvastatin (LIPITOR) 40 MG tablet TAKE 1 TABLET(40 MG) BY MOUTH DAILY 90 tablet 4     dorzolamide-timolol (COSOPT) 2-0.5 % ophthalmic solution Place 1 drop Into the left eye 2 times daily  6     latanoprost (XALATAN) 0.005 % ophthalmic solution Place 1 drop Into the left eye daily  3     lisinopril (PRINIVIL/ZESTRIL) 20 MG tablet TAKE 1 TABLET(20 MG) BY MOUTH DAILY 90 tablet 4     metFORMIN (GLUCOPHAGE) 500 MG tablet TAKE 1 TABLET BY MOUTH TWICE DAILY WITH MEALS 180 tablet 4     metoprolol tartrate (LOPRESSOR) 25 MG tablet Take one a day 90 tablet 4     omeprazole (PRILOSEC) 20 MG CR capsule TAKE 1 CAPSULE BY MOUTH EVERY DAY 30 TO 60 MINUTES BEFORE A MEAL 90 capsule 4     order for DME Equipment being ordered: Mild compression hose 1 each 0     warfarin (COUMADIN) 5 MG tablet TAKE ONE-HALF TABLET BY MOUTH ON DAILY EXCEPT TAKE 1 TABLET DAILY ON MONDAYS 136 tablet 0     Allergies   Allergen Reactions     Crestor [Rosuvastatin] Cramps     Penicillins      Swelling      BP Readings from Last 3 Encounters:   12/17/18 122/60   11/20/18 128/64   05/08/18 120/60    Wt Readings from Last 3 Encounters:   12/17/18 83.8 kg (184 lb 12.8 oz)   11/20/18 85.5 kg (188 lb 8 oz)   05/08/18 85.5 kg (188 lb 8 oz)                  Labs reviewed in EPIC    Reviewed and updated as needed this visit by clinical staff       Reviewed and updated as needed this visit by Provider         ROS:  This 88 year old male is here today by himself. He drove here. He is walking with a crutch due to recent back pain that he experienced while simply lying down while in home therapy did exercises with him. He simply extended his legs and had back pain. It is getting better and home physical therapy is complete now. However, the therapist was concerned because his pulse was low in the 60's at times. He was advised to be seen about this. All other review of systems are negative  Personal, family, and social history reviewed  "with patient and revised.         OBJECTIVE:     /60   Pulse 70   Temp 97.3  F (36.3  C) (Oral)   Resp 22   Ht 1.676 m (5' 6\")   Wt 83.8 kg (184 lb 12.8 oz)   SpO2 95%   BMI 29.83 kg/m    Body mass index is 29.83 kg/m .  GENERAL: healthy, alert and no distress  NECK: no adenopathy, no asymmetry, masses, or scars and thyroid normal to palpation  RESP: lungs clear to auscultation - no rales, rhonchi or wheezes  CV: regular rate and rhythm, normal S1 S2, no S3 or S4, no murmur, click or rub, no peripheral edema and peripheral pulses strong  MS: no gross musculoskeletal defects noted, no edema    Diagnostic Test Results:  none     ASSESSMENT/PLAN:              1. Benign essential hypertension  Good control and he is on a beta blocker that causes his pulse to be on the low side. Pulse is 70 and normal today. I reassured him.       Return to clinic as needed     LORENZO DONAHUE MD  [facility] [place]  "

## 2018-12-17 NOTE — PATIENT INSTRUCTIONS
[facility]    If you have any questions regarding to your visit please contact your care team:       Team Purple:   Clinic Hours Telephone Number   Dr. Bernie Salcido   7am-7pm  Monday - Thursday   7am-5pm  Fridays  [phone]  (Appointment scheduling available 24/7)   Urgent Care - [place] and [facility] - 11am-9pm Monday-Friday Saturday-Sunday- 9am-5pm   [place] - 5pm-9pm Monday-Friday Saturday-Sunday- 9am-5pm  [phone] - [place]  [phone] - [place]       What options do I have for a visit other than an office visit? We offer electronic visits (e-visits) and telephone visits, when medically appropriate.  Please check with your medical insurance to see if these types of visits are covered, as you will be responsible for any charges that are not paid by your insurance.      You can use [employer] (secure electronic communication) to access to your chart, send your primary care provider a message, or make an appointment. Ask a team member how to get started.     For a price quote for your services, please call our Consumer Price Line at [phone] or our Imaging Cost estimation line at [phone] (for imaging tests).

## 2018-12-26 ENCOUNTER — TRANSFERRED RECORDS (OUTPATIENT)
Dept: HEALTH INFORMATION MANAGEMENT | Facility: CLINIC | Age: 83
End: 2018-12-26

## 2018-12-26 ENCOUNTER — TELEPHONE (OUTPATIENT)
Dept: FAMILY MEDICINE | Facility: CLINIC | Age: 83
End: 2018-12-26

## 2018-12-26 NOTE — TELEPHONE ENCOUNTER
Called and spoke to patient.   Gave him information below.   Patient states that he will see if his neighbor can drive him otherwise he can drive himself.   No further questions.     Birgit Morales RN

## 2018-12-26 NOTE — TELEPHONE ENCOUNTER
Reason for call:  Other   Patient called regarding (reason for call): Injection referral  Additional comments: Patient states pain is so bad he wants an injection or he may hurt himself.    Phone number to reach patient:  Cell number on file:    Telephone Information:   Mobile [phone]       Best Time:  ASAP    Can we leave a detailed message on this number?  YES

## 2018-12-26 NOTE — TELEPHONE ENCOUNTER
Call him. If his pain is that bad, he probably should go to the ER where they can do imaging tests right away.     LORENZO DONAHUE M.D.

## 2018-12-26 NOTE — TELEPHONE ENCOUNTER
Spoke with patient regarding pain.   Reports that low back pain started 1-2 weeks ago since he started PT.  Reports that when he woke up today it was the worst pain and says he can't take it anymore.  Reports it is the worst in the morning and is sharp that radiates up back.   Patient denies any recent injury and thinks this started because of PT.  Has been using heating pad and Extra Strength Tylenol for pain but provides little relief.   Patient wants a cortisone or some kind of injection to help with the pain.   States that he cannot take the pain anymore and would rather die than have this pain.   Advised patient he can alternate ice and heat and continue taking Tylenol as directed.  Patient states he doesn't actually want to die and denies plans to commit suicide but he just wants pain relief.     Please advise on injection or plans.     Birgit Morales RN

## 2018-12-27 ENCOUNTER — TRANSFERRED RECORDS (OUTPATIENT)
Dept: HEALTH INFORMATION MANAGEMENT | Facility: CLINIC | Age: 83
End: 2018-12-27

## 2018-12-27 LAB — HBA1C MFR BLD: 6.2 % (ref 0–5.7)

## 2018-12-28 LAB
CREAT SERPL-MCNC: 0.96 MG/DL (ref 0.72–1.25)
GFR SERPL CREATININE-BSD FRML MDRD: >60 ML/MIN/1.73M2
GLUCOSE SERPL-MCNC: 121 MG/DL (ref 65–100)

## 2018-12-29 LAB — POTASSIUM SERPL-SCNC: 4.5 MMOL/L (ref 3.5–5)

## 2019-01-02 LAB — INR PPP: 1.3

## 2019-01-07 NOTE — PROGRESS NOTES
SUBJECTIVE:   Linwood Chi is a 88 year old male who presents to clinic today for the following health issues:    - Follow up  - S/P compression fracture: taking a pain pills. Had PT in hospital  - INR check: Restarted Coumadin  - AAA: follow up 1/28/2019.    Hospital Follow-up Visit:    Hospital/Nursing Home/IP Rehab Facility: [facility]  Date of Admission: 12/27/2018  Date of Discharge: 01/02/2019  Reason(s) for Admission: Back pain            Problems taking medications regularly:  None       Medication changes since discharge: None       Problems adhering to non-medication therapy:  None    Summary of hospitalization:  [facility] discharge summary reviewed  CareEverywhere information obtained and reviewed      Linwood Chi is an 88-year-old male who presents with severe back pain and found to have an incidental 4.9 cm infrarenal abdominal aortic aneurysm.  There appears to be no stigmata for an impending rupture and thus, no immediate vascular intervention is required.  I am okay with him proceeding with kyphoplasty for stabilization of a compression fracture.  We will continue to follow and arrange for outpatient elective repair of his abdominal aneurysm as he is a candidate for an endovascular endograft; has follow up 1/28/19.    Diagnostic Tests/Treatments reviewed.  Follow up needed: none  Other Healthcare Providers Involved in Patient s Care:         Homecare and Specialist appointment - Vascular surgery  Update since discharge: improved.     Post Discharge Medication Reconciliation: discharge medications reconciled, continue medications without change.  Plan of care communicated with patient     Coding guidelines for this visit:  Type of Medical   Decision Making Face-to-Face Visit       within 7 Days of discharge Face-to-Face Visit        within 14 days of discharge   Moderate Complexity 31249 [id number]   High Complexity 16589 39780            PROBLEMS TO ADD ON...    Diabetes  Follow-up      Patient is checking blood sugars: not at all    Diabetic concerns: None     Symptoms of hypoglycemia (low blood sugar): none     Paresthesias (numbness or burning in feet) or sores: No     Date of last diabetic eye exam: overdue    Diabetes Management Resources    Hyperlipidemia Follow-Up      Rate your low fat/cholesterol diet?: good    Taking statin?  No    Other lipid medications/supplements?:  none    Hypertension Follow-up      Outpatient blood pressures are not being checked.    Low Salt Diet: low salt    BP Readings from Last 2 Encounters:   01/08/19 110/60   12/17/18 122/60     Hemoglobin A1C (%)   Date Value   11/20/2018 6.3 (H)   05/08/2018 6.3 (H)     LDL Cholesterol Calculated (mg/dL)   Date Value   02/12/2018 59   12/26/2016 76     Chronic Afib:     On coumadin, check INR and INR clinic referral    Problem list and histories reviewed & adjusted, as indicated.  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     DM (diabetes mellitus), type 2 with renal complications (H)     A-fib (H)     Osteoarthritis of left knee     Onychomycosis     Long-term (current) use of anticoagulants [Z79.01]     Atrial fibrillation (HCC) [I48.91]     Benign essential hypertension     Gastroesophageal reflux disease without esophagitis     Type 2 diabetes mellitus with diabetic nephropathy, without long-term current use of insulin (H)     Past Surgical History:   Procedure Laterality Date     BYPASS GRAFT ARTERY CORONARY  2004    3 vessel 2004     OPEN REDUCTION INTERNAL FIXATION PATELLA         Social History     Tobacco Use     Smoking  "status: Former Smoker     Years: 50.00     Types: Cigarettes     Last attempt to quit: 3/4/1992     Years since quittin.8     Smokeless tobacco: Former User   Substance Use Topics     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    OBJECTIVE:     /60   Pulse 73   Temp 97.5  F (36.4  C) (Oral)   Resp 18   Ht 1.676 m (5' 6\")   Wt 80.5 kg (177 lb 8 oz)   SpO2 97%   BMI 28.65 kg/m    Body mass index is 28.65 kg/m .  GENERAL: healthy, walking with aid of walker, alert and no distress  NECK: no adenopathy and thyroid normal to palpation  RESP: lungs clear to auscultation - no rales, rhonchi or wheezes  CV: regular rate and rhythm, normal S1 S2, no S3 or S4, no murmur, click or rub, no peripheral edema.  ABDOMEN: soft, nontender, no masses and bowel sounds normal  MS: Surgical site in back clean, no erythema  NEURO: Normal strength and tone, mentation intact and speech normal  PSYCH: mentation appears normal, affect normal/bright      ASSESSMENT/PLAN:     (Z09) Hospital discharge follow-up  (primary encounter diagnosis)  Comment: Doing well after surgery. Pain manageable, ambulating well.    (S22.000D) Closed compression fracture of thoracic vertebra with routine healing, subsequent encounter  Comment: Had surgery and coming along very well    (I71.4) Abdominal aortic aneurysm (AAA) without rupture (H)  Comment: Asymptomatic  Plan: Follow up with vascular as planned    (I48.2) Chronic atrial fibrillation (H)  Comment: INR and coumadin titration.  Plan: INR, INR CLINIC REFERRAL, CANCELED: INR    (E11.21) Type 2 " diabetes mellitus with diabetic nephropathy, without long-term current use of insulin (H)  Comment: Last A1c 11/20/18 6.3    (I10) HTN, goal below 140/90  Comment: Well controlled    Follow up in 1 month or sooner with concerns    Sony Canchola MD  [facility]

## 2019-01-08 ENCOUNTER — OFFICE VISIT (OUTPATIENT)
Dept: FAMILY MEDICINE | Facility: CLINIC | Age: 84
End: 2019-01-08
Payer: COMMERCIAL

## 2019-01-08 VITALS
HEIGHT: 66 IN | RESPIRATION RATE: 18 BRPM | TEMPERATURE: 97.5 F | DIASTOLIC BLOOD PRESSURE: 60 MMHG | WEIGHT: 177.5 LBS | BODY MASS INDEX: 28.53 KG/M2 | HEART RATE: 73 BPM | SYSTOLIC BLOOD PRESSURE: 110 MMHG | OXYGEN SATURATION: 97 %

## 2019-01-08 DIAGNOSIS — Z09 HOSPITAL DISCHARGE FOLLOW-UP: Primary | ICD-10-CM

## 2019-01-08 DIAGNOSIS — I10 HTN, GOAL BELOW 140/90: ICD-10-CM

## 2019-01-08 DIAGNOSIS — S22.000D CLOSED COMPRESSION FRACTURE OF THORACIC VERTEBRA WITH ROUTINE HEALING, SUBSEQUENT ENCOUNTER: ICD-10-CM

## 2019-01-08 DIAGNOSIS — I48.20 CHRONIC ATRIAL FIBRILLATION (H): ICD-10-CM

## 2019-01-08 DIAGNOSIS — I71.40 ABDOMINAL AORTIC ANEURYSM (AAA) WITHOUT RUPTURE (H): ICD-10-CM

## 2019-01-08 DIAGNOSIS — E11.21 TYPE 2 DIABETES MELLITUS WITH DIABETIC NEPHROPATHY, WITHOUT LONG-TERM CURRENT USE OF INSULIN (H): ICD-10-CM

## 2019-01-08 LAB — INR PPP: 3.3 (ref 0.86–1.14)

## 2019-01-08 PROCEDURE — 99214 OFFICE O/P EST MOD 30 MIN: CPT | Performed by: FAMILY MEDICINE

## 2019-01-08 PROCEDURE — 85610 PROTHROMBIN TIME: CPT | Performed by: FAMILY MEDICINE

## 2019-01-08 PROCEDURE — 36415 COLL VENOUS BLD VENIPUNCTURE: CPT | Performed by: FAMILY MEDICINE

## 2019-01-08 ASSESSMENT — MIFFLIN-ST. JEOR: SCORE: 1417.88

## 2019-01-08 NOTE — PATIENT INSTRUCTIONS
[facility]    If you have any questions regarding to your visit please contact your care team:       Team Purple:   Clinic Hours Telephone Number   Dr. Bernie Salcido   7am-7pm  Monday - Thursday   7am-5pm  Fridays  [phone]  (Appointment scheduling available 24/7)   Urgent Care - [place] and [facility] - 11am-9pm Monday-Friday Saturday-Sunday- 9am-5pm   [place] - 5pm-9pm Monday-Friday Saturday-Sunday- 9am-5pm  [phone] - [place]  [phone] - [place]       What options do I have for a visit other than an office visit? We offer electronic visits (e-visits) and telephone visits, when medically appropriate.  Please check with your medical insurance to see if these types of visits are covered, as you will be responsible for any charges that are not paid by your insurance.      You can use [email or web address] (secure electronic communication) to access to your chart, send your primary care provider a message, or make an appointment. Ask a team member how to get started.     For a price quote for your services, please call our Consumer Price Line at [phone] or our Imaging Cost estimation line at [phone] (for imaging tests).

## 2019-01-09 ENCOUNTER — ANTICOAGULATION THERAPY VISIT (OUTPATIENT)
Dept: FAMILY MEDICINE | Facility: CLINIC | Age: 84
End: 2019-01-09
Payer: COMMERCIAL

## 2019-01-09 DIAGNOSIS — I48.91 ATRIAL FIBRILLATION (H): ICD-10-CM

## 2019-01-09 PROCEDURE — 99207 ZZC NO CHARGE NURSE ONLY: CPT | Performed by: FAMILY MEDICINE

## 2019-01-21 ENCOUNTER — TELEPHONE (OUTPATIENT)
Dept: FAMILY MEDICINE | Facility: CLINIC | Age: 84
End: 2019-01-21

## 2019-01-21 DIAGNOSIS — S22.000D CLOSED COMPRESSION FRACTURE OF THORACIC VERTEBRA WITH ROUTINE HEALING, SUBSEQUENT ENCOUNTER: Primary | ICD-10-CM

## 2019-01-21 RX ORDER — METHOCARBAMOL 500 MG/1
500 TABLET, FILM COATED ORAL 3 TIMES DAILY
Qty: 30 TABLET | Refills: 0 | Status: SHIPPED | OUTPATIENT
Start: 2019-01-21 | End: 2019-01-31

## 2019-01-21 NOTE — TELEPHONE ENCOUNTER
Spoke with pt. States he would like a refill for Methocarbamol 500mg 1 tab 3 times daily. Only had 12 in the bottle he had. When he took them with acetaminophen he had relief of pain at bedtime. Please advise.    Brianne Gibbs RN  [facility], [place]

## 2019-01-21 NOTE — TELEPHONE ENCOUNTER
Reason for Call:  Other prescription    Detailed comments:  Patient calling. He needs a refill of pain medication. Please call when ready to .     Phone Number Patient can be reached at: Home number on file [phone] (home)    Best Time:  Any     Can we leave a detailed message on this number? YES    Call taken on 1/21/2019 at 2:07 PM by Genet Mix

## 2019-01-23 ENCOUNTER — ANTICOAGULATION THERAPY VISIT (OUTPATIENT)
Dept: NURSING | Facility: CLINIC | Age: 84
End: 2019-01-23
Payer: COMMERCIAL

## 2019-01-23 DIAGNOSIS — I48.91 ATRIAL FIBRILLATION (H): ICD-10-CM

## 2019-01-23 LAB — INR POINT OF CARE: 5.2 (ref 0.86–1.14)

## 2019-01-23 PROCEDURE — 99207 ZZC NO CHARGE NURSE ONLY: CPT

## 2019-01-23 PROCEDURE — 36416 COLLJ CAPILLARY BLOOD SPEC: CPT

## 2019-01-23 PROCEDURE — 85610 PROTHROMBIN TIME: CPT | Mod: QW

## 2019-01-23 NOTE — PATIENT INSTRUCTIONS
[facility]:  Please call [phone] to cancel and/or reschedule your appointment   Please call [phone] with any problems or questions regarding your Warfarin therapy.    Some signs and symptoms of bleeding include: Nose bleed or cut that does not stop bleeding in 10 minutes, bleeding of the gums, vomiting (will look like coffee grounds) or coughing up blood, unusual, easy or large areas of bruising, increased or unexpected  Vaginal bleeding or increased menstrual flow, red or black stools, red or orange urine, prolonged or severe headache, pale skin, unusual or constant tiredness.  If you have these please call 911 or seek medical care immediately.

## 2019-01-23 NOTE — PROGRESS NOTES
ANTICOAGULATION FOLLOW-UP CLINIC VISIT    Patient Name:  Linwood Chi  Date:  2019  Contact Type:  Face to Face    SUBJECTIVE:     Patient Findings     Positives:   No Problem Findings, Unexplained INR or factor level change    Comments:   Bleeding Signs/Symptoms: NO  Thromboembolic Signs/Symptoms: NO     Medication Changes:  NO  Dietary Changes:  NO  Bacterial/Viral Infection: NO     Missed Coumadin Doses: NO  Other Concerns:  Patient already took today's dose. Advised that at next visit to not take warfarin until after his INR check. Patient verbalized understanding.             OBJECTIVE    INR Protime   Date Value Ref Range Status   2019 5.2 (A) 0.86 - 1.14 Final       ASSESSMENT / PLAN  No question data found.  Anticoagulation Summary  As of 2019    INR goal:   2.0-3.0   TTR:   94.6 % (2.8 y)   INR used for dosin.2! (2019)   Warfarin maintenance plan:   5 mg (5 mg x 1) every Mon; 2.5 mg (5 mg x 0.5) all other days   Full warfarin instructions:   : Hold; : Hold; Otherwise 5 mg every Mon; 2.5 mg all other days   Weekly warfarin total:   20 mg   Plan last modified:   Nannette Muhammad RN (2016)   Next INR check:   2019   Priority:   INR   Target end date:   Indefinite    Indications    Long-term (current) use of anticoagulants [Z79.01] [Z79.01]  Atrial fibrillation (HCC) [I48.91] [I48.91]             Anticoagulation Episode Summary     INR check location:       Preferred lab:       Send INR reminders to:   [facility]    Comments:         Anticoagulation Care Providers     Provider Role Specialty Phone number    Bernie Amaro MD [facility] [phone]            See the Encounter Report to view Anticoagulation Flowsheet and Dosing Calendar (Go to Encounters tab in chart review, and find the Anticoagulation Therapy Visit)        Layla Garcia RN

## 2019-01-28 ENCOUNTER — TRANSFERRED RECORDS (OUTPATIENT)
Dept: HEALTH INFORMATION MANAGEMENT | Facility: CLINIC | Age: 84
End: 2019-01-28

## 2019-01-29 ENCOUNTER — ANTICOAGULATION THERAPY VISIT (OUTPATIENT)
Dept: NURSING | Facility: CLINIC | Age: 84
End: 2019-01-29
Payer: COMMERCIAL

## 2019-01-29 DIAGNOSIS — I48.91 ATRIAL FIBRILLATION (H): ICD-10-CM

## 2019-01-29 LAB — INR POINT OF CARE: 2.5 (ref 0.86–1.14)

## 2019-01-29 PROCEDURE — 36416 COLLJ CAPILLARY BLOOD SPEC: CPT

## 2019-01-29 PROCEDURE — 99207 ZZC NO CHARGE NURSE ONLY: CPT

## 2019-01-29 PROCEDURE — 85610 PROTHROMBIN TIME: CPT | Mod: QW

## 2019-01-29 NOTE — PATIENT INSTRUCTIONS
[facility]:  Please call [phone] to cancel and/or reschedule your appointment   Please call [phone] with any problems or questions regarding your Warfarin therapy.

## 2019-01-29 NOTE — PROGRESS NOTES
ANTICOAGULATION FOLLOW-UP CLINIC VISIT    Patient Name:  Linwood Chi  Date:  2019  Contact Type:  Face to Face    SUBJECTIVE:     Patient Findings     Positives:   No Problem Findings    Comments:   Pt will be having AAA repair with iliac branch on 2/15/19. Preop scheduled for  with INR prior. Pt was already provided bridging instructions per cardiology. Was advised to hold warfarin for 5 days, surgery is day 6, supplement with lovenox injection 80mg twice daily for the 5 days. Day of surgery no coumadin, no lovenox           OBJECTIVE    INR Protime   Date Value Ref Range Status   2019 2.5 (A) 0.86 - 1.14 Final       ASSESSMENT / PLAN  INR assessment THER    Recheck INR In: 1 WEEK    INR Location Clinic      Anticoagulation Summary  As of 2019    INR goal:   2.0-3.0   TTR:   94.2 % (2.8 y)   INR used for dosin.5 (2019)   Warfarin maintenance plan:   5 mg (5 mg x 1) every Mon; 2.5 mg (5 mg x 0.5) all other days   Full warfarin instructions:   2/10: Hold; : Hold; : Hold; : Hold; : Hold; Otherwise 5 mg every Mon; 2.5 mg all other days   Weekly warfarin total:   20 mg   Plan last modified:   Nannette Muhammad RN (2016)   Next INR check:   2019   Priority:   INR   Target end date:   Indefinite    Indications    Long-term (current) use of anticoagulants [Z79.01] [Z79.01]  Atrial fibrillation (HCC) [I48.91] [I48.91]             Anticoagulation Episode Summary     INR check location:       Preferred lab:       Send INR reminders to:   [facility]    Comments:         Anticoagulation Care Providers     Provider Role Specialty Phone number    Bernie Amaro MD [facility] [phone]            See the Encounter Report to view Anticoagulation Flowsheet and Dosing Calendar (Go to Encounters tab in chart review, and find the Anticoagulation Therapy Visit)    Dosage adjustment made based on physician directed care plan.    Brianne MILES  Sai RN

## 2019-02-06 ENCOUNTER — ANTICOAGULATION THERAPY VISIT (OUTPATIENT)
Dept: NURSING | Facility: CLINIC | Age: 84
End: 2019-02-06
Payer: COMMERCIAL

## 2019-02-06 ENCOUNTER — OFFICE VISIT (OUTPATIENT)
Dept: FAMILY MEDICINE | Facility: CLINIC | Age: 84
End: 2019-02-06
Payer: COMMERCIAL

## 2019-02-06 VITALS
HEIGHT: 66 IN | TEMPERATURE: 98.5 F | BODY MASS INDEX: 27.93 KG/M2 | RESPIRATION RATE: 13 BRPM | HEART RATE: 75 BPM | OXYGEN SATURATION: 95 % | SYSTOLIC BLOOD PRESSURE: 136 MMHG | DIASTOLIC BLOOD PRESSURE: 82 MMHG | WEIGHT: 173.8 LBS

## 2019-02-06 DIAGNOSIS — E11.9 TYPE 2 DIABETES MELLITUS WITHOUT COMPLICATION, WITHOUT LONG-TERM CURRENT USE OF INSULIN (H): ICD-10-CM

## 2019-02-06 DIAGNOSIS — Z13.89 SCREENING FOR DIABETIC PERIPHERAL NEUROPATHY: ICD-10-CM

## 2019-02-06 DIAGNOSIS — I48.20 CHRONIC ATRIAL FIBRILLATION (H): ICD-10-CM

## 2019-02-06 DIAGNOSIS — I48.91 ATRIAL FIBRILLATION (H): ICD-10-CM

## 2019-02-06 DIAGNOSIS — Z01.818 PREOP GENERAL PHYSICAL EXAM: Primary | ICD-10-CM

## 2019-02-06 DIAGNOSIS — I71.20 THORACIC AORTIC ANEURYSM WITHOUT RUPTURE (H): ICD-10-CM

## 2019-02-06 LAB
ANION GAP SERPL CALCULATED.3IONS-SCNC: 7 MMOL/L (ref 3–14)
BUN SERPL-MCNC: 11 MG/DL (ref 7–30)
CALCIUM SERPL-MCNC: 8.8 MG/DL (ref 8.5–10.1)
CHLORIDE SERPL-SCNC: 104 MMOL/L (ref 94–109)
CHOLEST SERPL-MCNC: 127 MG/DL
CO2 SERPL-SCNC: 26 MMOL/L (ref 20–32)
CREAT SERPL-MCNC: 0.92 MG/DL (ref 0.66–1.25)
GFR SERPL CREATININE-BSD FRML MDRD: 74 ML/MIN/{1.73_M2}
GLUCOSE SERPL-MCNC: 99 MG/DL (ref 70–99)
HBA1C MFR BLD: 6 % (ref 0–5.6)
HDLC SERPL-MCNC: 44 MG/DL
HGB BLD-MCNC: 12.8 G/DL (ref 13.3–17.7)
INR POINT OF CARE: 3.2 (ref 0.86–1.14)
LDLC SERPL CALC-MCNC: 51 MG/DL
NONHDLC SERPL-MCNC: 83 MG/DL
POTASSIUM SERPL-SCNC: 4.2 MMOL/L (ref 3.4–5.3)
SODIUM SERPL-SCNC: 137 MMOL/L (ref 133–144)
TRIGL SERPL-MCNC: 161 MG/DL

## 2019-02-06 PROCEDURE — 80061 LIPID PANEL: CPT | Performed by: FAMILY MEDICINE

## 2019-02-06 PROCEDURE — 83036 HEMOGLOBIN GLYCOSYLATED A1C: CPT | Performed by: FAMILY MEDICINE

## 2019-02-06 PROCEDURE — 85018 HEMOGLOBIN: CPT | Performed by: FAMILY MEDICINE

## 2019-02-06 PROCEDURE — 99207 C FOOT EXAM  NO CHARGE: CPT | Performed by: FAMILY MEDICINE

## 2019-02-06 PROCEDURE — 80048 BASIC METABOLIC PNL TOTAL CA: CPT | Performed by: FAMILY MEDICINE

## 2019-02-06 PROCEDURE — 99215 OFFICE O/P EST HI 40 MIN: CPT | Performed by: FAMILY MEDICINE

## 2019-02-06 PROCEDURE — 85610 PROTHROMBIN TIME: CPT | Mod: QW

## 2019-02-06 PROCEDURE — 36416 COLLJ CAPILLARY BLOOD SPEC: CPT

## 2019-02-06 PROCEDURE — 99207 ZZC NO CHARGE NURSE ONLY: CPT

## 2019-02-06 ASSESSMENT — MIFFLIN-ST. JEOR: SCORE: 1401.1

## 2019-02-06 NOTE — PROGRESS NOTES
ANTICOAGULATION FOLLOW-UP CLINIC VISIT    Patient Name:  Linwood Chi  Date:  2/6/2019  Contact Type:  Face to Face    SUBJECTIVE:     Patient Findings     Positives:   No Problem Findings    Comments:   Bleeding Signs/Symptoms: NO  Thromboembolic Signs/Symptoms: NO     Medication Changes:  NO  Dietary Changes:  NO  Bacterial/Viral Infection: NO     Missed Coumadin Doses: NO  Other Concerns:  NO             OBJECTIVE    INR Protime   Date Value Ref Range Status   02/06/2019 3.2 (A) 0.86 - 1.14 Final       ASSESSMENT / PLAN  No question data found.  Anticoagulation Summary  As of 2/6/2019    INR goal:   2.0-3.0   TTR:   94.0 % (2.8 y)   INR used for dosing:   3.2! (2/6/2019)   Warfarin maintenance plan:   5 mg (5 mg x 1) every Mon; 2.5 mg (5 mg x 0.5) all other days   Full warfarin instructions:   2/10: Hold; 2/11: Hold; 2/12: Hold; 2/13: Hold; 2/14: Hold; Otherwise 5 mg every Mon; 2.5 mg all other days   Weekly warfarin total:   20 mg   Plan last modified:   Nannette Muhammad RN (4/11/2016)   Next INR check:   2/27/2019   Priority:   INR   Target end date:   Indefinite    Indications    Long-term (current) use of anticoagulants [Z79.01] [Z79.01]  Atrial fibrillation (HCC) [I48.91] [I48.91]             Anticoagulation Episode Summary     INR check location:       Preferred lab:       Send INR reminders to:   [facility]    Comments:         Anticoagulation Care Providers     Provider Role Specialty Phone number    Bernie Amaro MD [facility] [phone]            See the Encounter Report to view Anticoagulation Flowsheet and Dosing Calendar (Go to Encounters tab in chart review, and find the Anticoagulation Therapy Visit)        Layla Garcia RN

## 2019-02-06 NOTE — PATIENT INSTRUCTIONS
Before Your Surgery      Call your surgeon if there is any change in your health. This includes signs of a cold or flu (such as a sore throat, runny nose, cough, rash or fever).    Do not smoke, drink alcohol or take over the counter medicine (unless your surgeon or primary care doctor tells you to) for the 24 hours before and after surgery.    If you take prescribed drugs: Follow your doctor s orders about which medicines to take and which to stop until after surgery.    Eating and drinking prior to surgery: follow the instructions from your surgeon    Take a shower or bath the night before surgery. Use the soap your surgeon gave you to gently clean your skin. If you do not have soap from your surgeon, use your regular soap. Do not shave or scrub the surgery site.  Wear clean pajamas and have clean sheets on your bed.         Recommendations  Nothing to eat after midnight prior to surgery. Clear fluids up until 2 hours prior to check-in.  Afib:  1) Hold warfarin (coumadin) for 5 days, surgery day is day 6  2) Supplement with Lovenox injection 80 mg twice daily for the 5 days  3) Day of surgery no coumadin, no Lovenox    Diabetes:  Do not take Metformin/Glucophage, the morning of your procedure and until 48 hours after.

## 2019-02-06 NOTE — PATIENT INSTRUCTIONS
Schedule a follow up with INR 5-7 days after discharge from hospital from your surgery.    [facility]:  Please call [phone] to cancel and/or reschedule your appointment   Please call [phone] with any problems or questions regarding your Warfarin therapy.

## 2019-02-06 NOTE — PROGRESS NOTES
[facility]  [address] [place] [id number]  [phone]  Dept: [phone]    PRE-OP EVALUATION:  Today's date: 2019    Linwood Chi (: 1930) presents for pre-operative evaluation assessment as requested by Dr. Singletary.  He requires evaluation and anesthesia risk assessment prior to undergoing surgery/procedure for treatment of aneurysm/AAA.    Proposed Surgery/ Procedure: AAA repair  Date of Surgery/ Procedure: 02/15/2019  Time of Surgery/ Procedure: 7AM   Hospital/Surgical Facility: [facility]  Fax number for surgical facility: *  Primary Physician: Sony Canchola  Type of Anesthesia Anticipated: to be determined    Patient has a Health Care Directive or Living Will:  NO    1. NO - Do you have a history of heart attack, stroke, stent, bypass or surgery on an artery in the head, neck, heart or legs?  2. NO - Do you ever have any pain or discomfort in your chest?  3. NO - Do you have a history of  Heart Failure?  4. NO - Are you troubled by shortness of breath when: walking on the level, up a slight hill or at night?  5. NO - Do you currently have a cold, bronchitis or other respiratory infection?  6. NO - Do you have a cough, shortness of breath or wheezing?  7. NO - Do you sometimes get pains in the calves of your legs when you walk?  8. NO - Do you or anyone in your family have previous history of blood clots?  9. NO - Do you or does anyone in your family have a serious bleeding problem such as prolonged bleeding following surgeries or cuts?  10. NO - Have you ever had problems with anemia or been told to take iron pills?  11. NO - Have you had any abnormal blood loss such as black, tarry or bloody stools, or abnormal vaginal bleeding?  12. YES - HAVE YOU EVER HAD A BLOOD TRANSFUSION? Had transfusion 4-5 years ago  13. NO - Have you or any of your relatives ever had problems with anesthesia?  14. NO - Do you have sleep apnea, excessive snoring or daytime  drowsiness?  15. NO - Do you have any prosthetic heart valves?  16. NO - Do you have prosthetic joints?  17. NO - Is there any chance that you may be pregnant?  HPI:     HPI related to upcoming procedure: AAA, asymptomatic, meets criteria for repair.      See problem list for active medical problems.  Problems all longstanding and stable, except as noted/documented.  See ROS for pertinent symptoms related to these conditions.                                                                                                                                                          .  Flu shot: 9/2018: at [facility]  MEDICAL HISTORY:     Patient Active Problem List    Diagnosis Date Noted     Type 2 diabetes mellitus with diabetic nephropathy, without long-term current use of insulin (H) 02/12/2018     Priority: Medium     Gastroesophageal reflux disease without esophagitis 03/14/2017     Priority: Medium     Benign essential hypertension 07/22/2016     Priority: Medium     Long-term (current) use of anticoagulants [Z79.01] 04/11/2016     Priority: Medium     Atrial fibrillation (HCC) [I48.91] 04/11/2016     Priority: Medium     Onychomycosis 12/31/2015     Priority: Medium     Osteoarthritis of left knee 10/14/2013     Priority: Medium     A-fib (H) 01/16/2013     Priority: Medium     Proteinuria 11/27/2012     Priority: Medium     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 Provider to review       DM (diabetes mellitus), type 2 with renal complications (H) 11/27/2012     Priority: Medium     Problem list name updated by automated process. Provider to review       Hernia, epigastric 08/29/2012     Priority: Medium     Rectal bleeding 10/18/2011     Priority: Medium     Admit-[facility].       Hyperlipidemia with target LDL less than 100      Priority: Medium     Diagnosis updated by automated  process. Provider to review and confirm.       CKD (chronic kidney disease) stage 3, GFR 30-59 ml/min (H) 06/10/2011     Priority: Medium     GFR Estimate   Date Value Range Status   7/23/2014 54* >60 mL/min/1.7m2 Final   1/14/2014 56* >60 mL/min/1.7m2 Final   1/16/2013 68  >60 mL/min/1.7m2 Final   11/23/2012 60* >60 mL/min/1.7m2 Final   7/12/2012 55* >60 mL/min/1.7m2 Final     GFR Estimate If Black   Date Value Range Status   7/23/2014 65  >60 mL/min/1.7m2 Final   1/14/2014 68  >60 mL/min/1.7m2 Final   1/16/2013 83  >60 mL/min/1.7m2 Final   11/23/2012 73  >60 mL/min/1.7m2 Final   7/12/2012 67  >60 mL/min/1.7m2 Final            Advanced directives, counseling/discussion 06/09/2011     Priority: Medium     Discussed Advance Directive planning with patient; information given to patient to review.         CAD (coronary artery disease)      Priority: Medium     4 vessel coronary artery bypass surgery , about 2002.has done well ever since        Past Medical History:   Diagnosis Date     CAD (coronary artery disease)      History of tobacco use     Smoked x 50 years, Quit 1996     Hyperlipidemia LDL goal < 100      Hypertension goal BP (blood pressure) < 130/80      Rectal bleeding 10/18/2011    [facility]     Type 2 diabetes, HbA1c goal < 7% (H)      Past Surgical History:   Procedure Laterality Date     BYPASS GRAFT ARTERY CORONARY  2004    3 vessel 2004     OPEN REDUCTION INTERNAL FIXATION PATELLA       Current Outpatient Medications   Medication Sig Dispense Refill     amLODIPine (NORVASC) 5 MG tablet TAKE 1 TABLET(5 MG) BY MOUTH DAILY 90 tablet 4     aspirin 81 MG tablet Take 1 tablet by mouth daily.  3     atorvastatin (LIPITOR) 40 MG tablet TAKE 1 TABLET(40 MG) BY MOUTH DAILY 90 tablet 4     dorzolamide-timolol (COSOPT) 2-0.5 % ophthalmic solution Place 1 drop Into the left eye 2 times daily  6     latanoprost (XALATAN) 0.005 % ophthalmic solution Place 1 drop Into the left eye daily  3     lisinopril  "(PRINIVIL/ZESTRIL) 20 MG tablet TAKE 1 TABLET(20 MG) BY MOUTH DAILY 90 tablet 4     metFORMIN (GLUCOPHAGE) 500 MG tablet TAKE 1 TABLET BY MOUTH TWICE DAILY WITH MEALS 180 tablet 4     metoprolol tartrate (LOPRESSOR) 25 MG tablet Take one a day 90 tablet 4     omeprazole (PRILOSEC) 20 MG CR capsule TAKE 1 CAPSULE BY MOUTH EVERY DAY 30 TO 60 MINUTES BEFORE A MEAL 90 capsule 4     order for DME Equipment being ordered: Mild compression hose 1 each 0     warfarin (COUMADIN) 5 MG tablet TAKE ONE-HALF TABLET BY MOUTH ON DAILY EXCEPT TAKE 1 TABLET DAILY ON  136 tablet 0     OTC products: None, except as noted above    Allergies   Allergen Reactions     Crestor [Rosuvastatin] Cramps     Penicillins      Swelling       Latex Allergy: NO    Social History     Tobacco Use     Smoking status: Former Smoker     Years: 50.00     Types: Cigarettes     Last attempt to quit: 3/4/1992     Years since quittin.9     Smokeless tobacco: Former User   Substance Use Topics     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    EXAM:   /82   Pulse 75   Temp 98.5  F (36.9  C) (Oral)   Resp 13   Ht 1.676 m (5' 6\")   Wt 78.8 kg (173 lb 12.8 oz)   SpO2 95%   BMI 28.05 kg/m       Recheck /82    GENERAL APPEARANCE: healthy, alert and no distress     EYES: EOMI,  PERRL     HENT: ear canals and TM's normal and nose and mouth without ulcers or lesions     NECK: no adenopathy and thyroid normal to palpation     RESP: lungs clear to auscultation - no rales, rhonchi or wheezes     CV: regular rates and rhythm, normal S1 S2, no S3 or S4 and no murmur, click or rub     ABDOMEN:  soft, nontender, no HSM or masses and bowel sounds normal      SKIN: no suspicious lesions or rashes     NEURO: Normal strength and tone, sensory exam grossly normal, mentation intact and " speech normal     PSYCH: mentation appears normal. and affect normal/bright    DIAGNOSTICS:   EKG: Marcos 1/2/2019    Interpretation Atrial fibrillation  Right bundle branch block  T wave abnormality, consider lateral ischemia  Abnormal ECG  When compared with ECG of 18-OCT-2011 08:29,  Atrial fibrillation has replaced Sinus rhythm  ST now depressed in Anterolateral leads  T wave inversion now evident in Anterolateral leads     Ventricular Rate 69 BPM   Atrial Rate 288 BPM   P-R Interval   ms   QRS Duration 136 ms    ms   QTc 415 ms   P Axis   degrees   R Axis -19 degrees   T Axis -36 degrees       Recent Labs   Lab Test 02/06/19 01/29/19 12/29/18 12/28/18 12/27/18 11/20/18  1042  05/08/18  1528   HGB  --   --   --   --   --   --   --  12.3*  --  13.4   PLT  --   --   --   --   --   --   --  293  --  260   INR 3.2* 2.5*   < >  --   --   --    < >  --    < >  --    NA  --   --   --   --   --   --   --  135  --  140   POTASSIUM  --   --   --  4.5  --   --   --  4.2  --  4.4   CR  --   --   --   --  0.96  --   --  1.24  --  1.19   A1C  --   --   --   --   --  6.2  --  6.3*  --  6.3*    < > = values in this interval not displayed.      Results for orders placed or performed in visit on 02/06/19   Hemoglobin A1c   Result Value Ref Range    Hemoglobin A1C 6.0 (H) 0 - 5.6 %     IMPRESSION:   Reason for surgery/procedure: AAA/AAA repair with iliac branch    Diagnosis/reason for consult: AAA/Pre OP    The proposed surgical procedure is considered HIGH risk.    REVISED CARDIAC RISK INDEX  The patient has the following serious cardiovascular risks for perioperative complications such as (MI, PE, VFib and 3  AV Block):  No serious cardiac risks  INTERPRETATION: 2 risks: Class III (moderate risk - 6.6% complication rate)    The patient has the following additional risks for perioperative complications:  No identified additional risks      ICD-10-CM    1. Preop general physical exam Z01.818 Basic metabolic panel   2.  Thoracic aortic aneurysm without rupture (H) I71.2    3. Chronic atrial fibrillation (H) I48.2    4. Type 2 diabetes mellitus without complication, without long-term current use of insulin (H) E11.9 Lipid panel reflex to direct LDL Non-fasting     Basic metabolic panel     Hemoglobin A1c   5. Screening for diabetic peripheral neuropathy Z13.89 FOOT EXAM  NO CHARGE [40164.114]       RECOMMENDATIONS:     --Consult hospital rounder / IM to assist post-op medical management    --Patient is to take all scheduled medications on the day of surgery EXCEPT for modifications listed below.    Diabetes Medication Use    -----Hold usual oral and non-insulin diabetic meds (e.g. Metformin, Actos, Glipizide) while NPO.     Anticoagulant or Antiplatelet Medication Use  ASPIRIN: Discontinue ASA 7-10 days prior to procedure to reduce bleeding risk.  It should be resumed post-operatively.  WARFARIN: Bridging therapy will be coordinated by Cardiology      Per cardiology:  1) Hold warfarin (coumadin) for 5 days, surgery day is day 6  2) Supplement with Lovenox injection 80 mg twice daily for the 5 days  3) Day of surgery no coumadin, no Lovenox.     APPROVAL GIVEN to proceed with proposed procedure, without further diagnostic evaluation.     Signed Electronically by: Sony Canchola MD    Copy of this evaluation report is provided to requesting physician.    [place] Preop Guidelines    Revised Cardiac Risk Index

## 2019-02-06 NOTE — NURSING NOTE
"Chief Complaint   Patient presents with     Pre-Op Exam     Initial /84 (BP Location: Left arm, Patient Position: Chair, Cuff Size: Adult Regular)   Pulse 75   Temp 98.5  F (36.9  C) (Oral)   Resp 13   Ht 1.676 m (5' 6\")   Wt 78.8 kg (173 lb 12.8 oz)   SpO2 95%   BMI 28.05 kg/m   Estimated body mass index is 28.05 kg/m  as calculated from the following:    Height as of this encounter: 1.676 m (5' 6\").    Weight as of this encounter: 78.8 kg (173 lb 12.8 oz).  BP completed using cuff size: regular    Jose Quintero  "

## 2019-02-06 NOTE — LETTER
[address] [place] [address]    February 11, 2019    Linwood Chi  [address] [facility] [address]    Dear Linwood,    All at baseline    Enclosed is a copy of your results.     Results for orders placed or performed in visit on 02/06/19   Lipid panel reflex to direct LDL Non-fasting   Result Value Ref Range    Cholesterol 127 <200 mg/dL    Triglycerides 161 (H) <150 mg/dL    HDL Cholesterol 44 >39 mg/dL    LDL Cholesterol Calculated 51 <100 mg/dL    Non HDL Cholesterol 83 <130 mg/dL   Basic metabolic panel   Result Value Ref Range    Sodium 137 133 - 144 mmol/L    Potassium 4.2 3.4 - 5.3 mmol/L    Chloride 104 94 - 109 mmol/L    Carbon Dioxide 26 20 - 32 mmol/L    Anion Gap 7 3 - 14 mmol/L    Glucose 99 70 - 99 mg/dL    Urea Nitrogen 11 7 - 30 mg/dL    Creatinine 0.92 0.66 - 1.25 mg/dL    GFR Estimate 74 >60 mL/min/[1.73_m2]    GFR Estimate If Black 86 >60 mL/min/[1.73_m2]    Calcium 8.8 8.5 - 10.1 mg/dL   Hemoglobin A1c   Result Value Ref Range    Hemoglobin A1C 6.0 (H) 0 - 5.6 %   Hemoglobin   Result Value Ref Range    Hemoglobin 12.8 (L) 13.3 - 17.7 g/dL   If you have any questions or concerns, please call myself or my nurse at [phone].      Sincerely,    Sony Canchola MD/sienna

## 2019-02-14 ENCOUNTER — TELEPHONE (OUTPATIENT)
Dept: FAMILY MEDICINE | Facility: CLINIC | Age: 84
End: 2019-02-14

## 2019-02-14 NOTE — TELEPHONE ENCOUNTER
Reason for call:  Other   Patient called regarding (reason for call): call back  Additional comments: Needs Preop signed and sent to them. Or visible on care everywhere with signature ASA fax number [phone]    Phone number to reach patient:  Other phone number:  [phone]    Best Time:  any    Can we leave a detailed message on this number?  YES

## 2019-02-14 NOTE — TELEPHONE ENCOUNTER
Called provider to sign off on pre-op Faxed signed pre-op to [facility] to number given.  Evie Rawls,

## 2019-02-17 ENCOUNTER — TRANSFERRED RECORDS (OUTPATIENT)
Dept: HEALTH INFORMATION MANAGEMENT | Facility: CLINIC | Age: 84
End: 2019-02-17

## 2019-02-17 ENCOUNTER — MEDICAL CORRESPONDENCE (OUTPATIENT)
Dept: HEALTH INFORMATION MANAGEMENT | Facility: CLINIC | Age: 84
End: 2019-02-17

## 2019-02-18 ENCOUNTER — OFFICE VISIT (OUTPATIENT)
Dept: FAMILY MEDICINE | Facility: CLINIC | Age: 84
End: 2019-02-18
Payer: COMMERCIAL

## 2019-02-18 ENCOUNTER — MEDICAL CORRESPONDENCE (OUTPATIENT)
Dept: HEALTH INFORMATION MANAGEMENT | Facility: CLINIC | Age: 84
End: 2019-02-18

## 2019-02-18 ENCOUNTER — ANTICOAGULATION THERAPY VISIT (OUTPATIENT)
Dept: FAMILY MEDICINE | Facility: CLINIC | Age: 84
End: 2019-02-18

## 2019-02-18 VITALS
HEART RATE: 75 BPM | WEIGHT: 175 LBS | HEIGHT: 66 IN | BODY MASS INDEX: 28.12 KG/M2 | RESPIRATION RATE: 18 BRPM | DIASTOLIC BLOOD PRESSURE: 66 MMHG | SYSTOLIC BLOOD PRESSURE: 120 MMHG | OXYGEN SATURATION: 96 % | TEMPERATURE: 97.6 F

## 2019-02-18 DIAGNOSIS — I48.20 CHRONIC ATRIAL FIBRILLATION (H): ICD-10-CM

## 2019-02-18 DIAGNOSIS — Z98.890 S/P AAA REPAIR: ICD-10-CM

## 2019-02-18 DIAGNOSIS — Z86.79 S/P AAA REPAIR: ICD-10-CM

## 2019-02-18 DIAGNOSIS — I71.40 ABDOMINAL AORTIC ANEURYSM (AAA) WITHOUT RUPTURE (H): Primary | ICD-10-CM

## 2019-02-18 LAB — INR PPP: 1.5 (ref 0.86–1.14)

## 2019-02-18 PROCEDURE — 36416 COLLJ CAPILLARY BLOOD SPEC: CPT | Performed by: NURSE PRACTITIONER

## 2019-02-18 PROCEDURE — 99207 ZZC NO CHARGE NURSE ONLY: CPT | Performed by: FAMILY MEDICINE

## 2019-02-18 PROCEDURE — 85610 PROTHROMBIN TIME: CPT | Performed by: NURSE PRACTITIONER

## 2019-02-18 PROCEDURE — 99214 OFFICE O/P EST MOD 30 MIN: CPT | Performed by: NURSE PRACTITIONER

## 2019-02-18 ASSESSMENT — MIFFLIN-ST. JEOR: SCORE: 1406.54

## 2019-02-18 NOTE — PATIENT INSTRUCTIONS
[facility]    If you have any questions regarding to your visit please contact your care team:     Team Pink:   Clinic Hours Telephone Number   Internal Medicine:  Dr. Shahla James NP 7am-7pm  Monday - Thursday   7am-5pm  Fridays  [phone]  (Appointment scheduling available 24/7)   Urgent Care - [place] and [facility] - 11am-9pm Monday-Friday Saturday-Sunday- 9am-5pm   [place] - 5pm-9pm Monday-Friday Saturday-Sunday- 9am-5pm  [phone] - [place]  [phone] - [place]       What options do I have for a visit other than an office visit? We offer electronic visits (e-visits) and telephone visits, when medically appropriate.  Please check with your medical insurance to see if these types of visits are covered, as you will be responsible for any charges that are not paid by your insurance.      You can use [employer] (secure electronic communication) to access to your chart, send your primary care provider a message, or make an appointment. Ask a team member how to get started.     For a price quote for your services, please call our Consumer Price Line at [phone] or our Imaging Cost estimation line at [phone] (for imaging tests).  Maribel CHRISTINE CMA

## 2019-02-18 NOTE — PROGRESS NOTES
SUBJECTIVE:   Linwood Chi is a 88 year old male who presents to clinic today for the following health issues:          Hospital Follow-up Visit:    Hospital/Nursing Home/IP Rehab Facility: [facility]   Date of Admission: 02/15/2019  Date of Discharge: 02/16/2019  Reason(s) for Admission:   Abdominal aortic aneurysm (AAA) without rupture (HC) (Primary Dx);   Chronic atrial fibrillation (HC)                 Problems taking medications regularly:  None       Medication changes since discharge: None       Problems adhering to non-medication therapy:  None    Summary of hospitalization:  CareEverywhere information obtained and reviewed  Diagnostic Tests/Treatments reviewed.  Follow up needed: INR  Other Healthcare Providers Involved in Patient s Care:         None  Update since discharge: improved.     Patient underwent endovascular repair of AAA without complication per patient.  Discharge summary unavailable at this time.    Patient denies any drainage or bruising around groin sites.  He continues lovenox and is due to INR today.  Patient took 5 mg of warfarin today and took 2.5 mg yesterday.  He notes he is eating and drinking okay.  He had a bm today and denies and melena, hematochezia.  Patient denies dizziness, chest pain, shortness of breath.    Post Discharge Medication Reconciliation: discharge medications reconciled and changed, per note/orders (see AVS).  Plan of care communicated with patient     Coding guidelines for this visit:  Type of Medical   Decision Making Face-to-Face Visit       within 7 Days of discharge Face-to-Face Visit        within 14 days of discharge   Moderate Complexity [id number]   High Complexity [id number]                Problem list and histories reviewed & adjusted, as indicated.  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     DM (diabetes mellitus), type 2 with renal complications (H)     A-fib (H)     Osteoarthritis of left knee     Onychomycosis     Long-term (current) use of anticoagulants [Z79.01]     Atrial fibrillation (HCC) [I48.91]     Benign essential hypertension     Gastroesophageal reflux disease without esophagitis     Type 2 diabetes mellitus with diabetic nephropathy, without long-term current use of insulin (H)     Past Surgical History:   Procedure Laterality Date     BYPASS GRAFT ARTERY CORONARY      3 vessel      OPEN REDUCTION INTERNAL FIXATION PATELLA         Social History     Tobacco Use     Smoking status: Former Smoker     Years: 50.00     Types: Cigarettes     Last attempt to quit: 3/4/1992     Years since quittin.9     Smokeless tobacco: Former User   Substance Use Topics     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  3     atorvastatin (LIPITOR) 40 MG tablet TAKE 1 TABLET(40 MG) BY MOUTH DAILY 90 tablet 4     dorzolamide-timolol (COSOPT) 2-0.5 % ophthalmic solution Place 1 drop Into the left eye 2 times daily  6     latanoprost (XALATAN) 0.005 % ophthalmic solution Place 1 drop Into the left eye daily  3     lisinopril (PRINIVIL/ZESTRIL) 20 MG tablet TAKE 1 TABLET(20 MG) BY MOUTH DAILY 90 tablet 4     metFORMIN (GLUCOPHAGE) 500 MG  "tablet TAKE 1 TABLET BY MOUTH TWICE DAILY WITH MEALS 180 tablet 4     metoprolol tartrate (LOPRESSOR) 25 MG tablet Take one a day 90 tablet 4     omeprazole (PRILOSEC) 20 MG CR capsule TAKE 1 CAPSULE BY MOUTH EVERY DAY 30 TO 60 MINUTES BEFORE A MEAL 90 capsule 4     order for DME Equipment being ordered: Mild compression hose 1 each 0     warfarin (COUMADIN) 5 MG tablet TAKE ONE-HALF TABLET BY MOUTH ON DAILY EXCEPT TAKE 1 TABLET DAILY ON MONDAYS 136 tablet 0     Allergies   Allergen Reactions     Crestor [Rosuvastatin] Cramps     Penicillins      Swelling      BP Readings from Last 3 Encounters:   02/18/19 120/66   02/06/19 136/82   01/08/19 110/60    Wt Readings from Last 3 Encounters:   02/18/19 79.4 kg (175 lb)   02/06/19 78.8 kg (173 lb 12.8 oz)   01/08/19 80.5 kg (177 lb 8 oz)                  Labs reviewed in EPIC    Reviewed and updated as needed this visit by clinical staff       Reviewed and updated as needed this visit by Provider         ROS:  Constitutional, HEENT, cardiovascular, pulmonary, gi and gu systems are negative, except as otherwise noted.    OBJECTIVE:     /66   Pulse 75   Temp 97.6  F (36.4  C) (Oral)   Resp 18   Ht 1.676 m (5' 6\")   Wt 79.4 kg (175 lb)   SpO2 96%   BMI 28.25 kg/m    Body mass index is 28.25 kg/m .  GENERAL: healthy, alert and no distress  RESP: lungs clear to auscultation - no rales, rhonchi or wheezes  CV: regular rate and rhythm, normal S1 S2, no S3 or S4, no murmur, click or rub, no peripheral edema and peripheral pulses strong  MS: no gross musculoskeletal defects noted, no edema  SKIN: Bilateral groin sites soft to touch, minimal bruising noted to right groin site.    Diagnostic Test Results:  none     ASSESSMENT/PLAN:     1. Abdominal aortic aneurysm (AAA) without rupture (H)  Patient doing well post repair.    2. S/P AAA repair  As above.     3. Chronic atrial fibrillation (H)    - INR    FUTURE APPOINTMENTS:       - Follow-up for annual visit or as " needed    GONZALO Mcbride CNP  [facility]

## 2019-02-18 NOTE — PROGRESS NOTES
ANTICOAGULATION FOLLOW-UP CLINIC VISIT    Patient Name:  Linwood Chi  Date:  2019  Contact Type:  Telephone    SUBJECTIVE:     Patient Findings     Positives:   No Problem Findings    Comments:   S/O:  Raven from [facility] calls with patients INR today of 1.5.  Linwood Chi is on Coumadin for A. Fib.  Current Coumadin dose is 5 mg Mon and 2.5 mg ROW.   Concerns today are: Patient to continue Lovenox.    A/P: Linwood Chi's INR is Subtherapeutic  for his/her goal range of 2 - 3.  Reasons why INR is out of range may include: Intentional Hold  Recommended to have Linwood Chi increase Coumadin dose to 5 mg Mon/Tue and 2.5 mg ROW and to have his/her INR rechecked in 3 days on .      Layla Garcia RN BC             OBJECTIVE    INR   Date Value Ref Range Status   2019 1.50 (H) 0.86 - 1.14 Final       ASSESSMENT / PLAN  No question data found.  Anticoagulation Summary  As of 2019    INR goal:   2.0-3.0   TTR:   93.6 % (2.8 y)   INR used for dosin.50! (2019)   Warfarin maintenance plan:   5 mg (5 mg x 1) every Mon; 2.5 mg (5 mg x 0.5) all other days   Full warfarin instructions:   : 5 mg; Otherwise 5 mg every Mon; 2.5 mg all other days   Weekly warfarin total:   20 mg   Plan last modified:   Nannette Muhammad RN (2016)   Next INR check:   2019   Priority:   INR   Target end date:   Indefinite    Indications    Long-term (current) use of anticoagulants [Z79.01] [Z79.01]  Atrial fibrillation (HCC) [I48.91] [I48.91]             Anticoagulation Episode Summary     INR check location:       Preferred lab:       Send INR reminders to:   [facility]    Comments:         Anticoagulation Care Providers     Provider Role Specialty Phone number    Bernie Amaro MD [facility] [phone]            See the Encounter Report to view Anticoagulation Flowsheet and Dosing Calendar (Go to Encounters tab in chart review, and find the  Anticoagulation Therapy Visit)        Layla Garcia RN

## 2019-02-21 ENCOUNTER — ANTICOAGULATION THERAPY VISIT (OUTPATIENT)
Dept: FAMILY MEDICINE | Facility: CLINIC | Age: 84
End: 2019-02-21
Payer: COMMERCIAL

## 2019-02-21 DIAGNOSIS — I48.20 CHRONIC ATRIAL FIBRILLATION (H): ICD-10-CM

## 2019-02-21 LAB — INR PPP: 2.5 (ref 0.8–1.1)

## 2019-02-21 PROCEDURE — 99207 ZZC NO CHARGE NURSE ONLY: CPT | Performed by: FAMILY MEDICINE

## 2019-02-21 NOTE — PROGRESS NOTES
ANTICOAGULATION FOLLOW-UP CLINIC VISIT    Patient Name:  Linwood Chi  Date:  2019  Contact Type:  Telephone    SUBJECTIVE:     Patient Findings     Positives:   Other complaints (Pt is currently bridging with lovenox)    Comments:   S/O:  Raven DAWSON from [facility] care calls with patients INR today of venous 2.5.  Linwood Chi is on Coumadin for A. Fib.  Current Coumadin dose is  5mg MT, 2.5mg W.   Concerns today are: None Noted.    A/P: Linwood Chi's INR is Therapeutic  for his/her goal range of 2 - 3.  Reasons why INR is out of range may include: NA  Recommended to have Linwood Chi remain on the same Coumadin dose and to have his/her INR rechecked in 6 days on 19. Pt is currently being bridged with lovenox 80mg bid s/p AAA repair. Discussed with Anita pharmacist and she advised ok to discontinue lovenox due to INR going >1 point in 3 days. She advised to resume maintenance dose and recheck INR on Wed.     Brianne Gibbs RN  [facility]             OBJECTIVE    INR   Date Value Ref Range Status   2019 2.5 0.8 - 1.1 Final       ASSESSMENT / PLAN  INR assessment THER    Recheck INR In: 6 DAYS    INR Location [facility] INR      Anticoagulation Summary  As of 2019    INR goal:   2.0-3.0   TTR:   93.5 % (2.8 y)   INR used for dosin.5 (2019)   Warfarin maintenance plan:   5 mg (5 mg x 1) every Mon; 2.5 mg (5 mg x 0.5) all other days   Full warfarin instructions:   5 mg every Mon; 2.5 mg all other days   Weekly warfarin total:   20 mg   No change documented:   Brianne Gibbs RN   Plan last modified:   Nannette Muhammad RN (2016)   Next INR check:   2019   Priority:   INR   Target end date:   Indefinite    Indications    Long-term (current) use of anticoagulants [Z79.01] [Z79.01]  Atrial fibrillation (HCC) [I48.91] [I48.91]             Anticoagulation Episode Summary     INR check location:       Preferred lab:       Send INR  reminders to:   [facility]    Comments:         Anticoagulation Care Providers     Provider Role Specialty Phone number    Bernie Amaro MD [facility] [phone]            See the Encounter Report to view Anticoagulation Flowsheet and Dosing Calendar (Go to Encounters tab in chart review, and find the Anticoagulation Therapy Visit)    Dosage adjustment made based on physician directed care plan.    Brianne Gibbs RN

## 2019-02-27 ENCOUNTER — ANTICOAGULATION THERAPY VISIT (OUTPATIENT)
Dept: FAMILY MEDICINE | Facility: CLINIC | Age: 84
End: 2019-02-27
Payer: COMMERCIAL

## 2019-02-27 DIAGNOSIS — I48.20 CHRONIC ATRIAL FIBRILLATION (H): ICD-10-CM

## 2019-02-27 LAB
INR POINT OF CARE: 4.4 (ref 0.86–1.14)
INR PPP: 3.3 (ref 0.8–1.1)

## 2019-02-27 PROCEDURE — 36416 COLLJ CAPILLARY BLOOD SPEC: CPT | Performed by: FAMILY MEDICINE

## 2019-02-27 PROCEDURE — 85610 PROTHROMBIN TIME: CPT | Mod: QW | Performed by: FAMILY MEDICINE

## 2019-02-27 NOTE — PROGRESS NOTES
ANTICOAGULATION FOLLOW-UP CLINIC VISIT    Patient Name:  Linwood Chi  Date:  2/27/2019  Contact Type:  Telephone/ [phone]    SUBJECTIVE:     Patient Findings     Positives:   No Problem Findings, Unexplained INR or factor level change    Comments:   S/O:  Raven from [facility] calls with patients INR today of 3.3 venously.  Linwood Chi is on Coumadin for A. Fib.  Current Coumadin dose is 5 mg Mon and 2.5 mg ROW.   Concerns today are: Patient already took today's dose.    A/P: Linwood Chi's INR is Supratherapeutic  for his/her goal range of 2 - 3.  Reasons why INR is out of range may include: unknown  Recommended to have Linwood Chi decrease Coumadin dose to hold tomorrow then resume maintenance dose and to have his/her INR rechecked in 1 week on 3/6.      Layla Garcia RN BC             OBJECTIVE    INR   Date Value Ref Range Status   02/27/2019 3.3 (A) 0.8 - 1.1 Final       ASSESSMENT / PLAN  No question data found.  Anticoagulation Summary  As of 2/27/2019    INR goal:   2.0-3.0   TTR:   93.1 % (2.9 y)   INR used for dosing:   3.3! (2/27/2019)   Warfarin maintenance plan:   5 mg (5 mg x 1) every Mon; 2.5 mg (5 mg x 0.5) all other days   Full warfarin instructions:   2/28: Hold; Otherwise 5 mg every Mon; 2.5 mg all other days   Weekly warfarin total:   20 mg   Plan last modified:   Nannette Muhammad RN (4/11/2016)   Next INR check:   3/6/2019   Priority:   INR   Target end date:   Indefinite    Indications    Long-term (current) use of anticoagulants [Z79.01] [Z79.01]  Atrial fibrillation (HCC) [I48.91] [I48.91]             Anticoagulation Episode Summary     INR check location:       Preferred lab:       Send INR reminders to:   [facility]    Comments:         Anticoagulation Care Providers     Provider Role Specialty Phone number    Bernie Amaro MD [facility] [phone]            See the Encounter Report to view Anticoagulation Flowsheet  and Dosing Calendar (Go to Encounters tab in chart review, and find the Anticoagulation Therapy Visit)    Dosage adjustment made based on physician directed care plan.    Layla Garcia RN

## 2019-03-06 ENCOUNTER — ANTICOAGULATION THERAPY VISIT (OUTPATIENT)
Dept: FAMILY MEDICINE | Facility: CLINIC | Age: 84
End: 2019-03-06
Payer: COMMERCIAL

## 2019-03-06 DIAGNOSIS — I48.20 CHRONIC ATRIAL FIBRILLATION (H): ICD-10-CM

## 2019-03-06 LAB — INR PPP: 3.4 (ref 0.8–1.1)

## 2019-03-06 PROCEDURE — 99207 ZZC NO CHARGE NURSE ONLY: CPT | Performed by: FAMILY MEDICINE

## 2019-03-06 NOTE — PROGRESS NOTES
ANTICOAGULATION FOLLOW-UP CLINIC VISIT    Patient Name:  Linwood Chi  Date:  3/6/2019  Contact Type:  Face to Face    SUBJECTIVE:     Patient Findings     Positives:   No Problem Findings    Comments:   S/O:  Jordana from [facility] care calls with patients INR today of 3.4.  Linwood Chi is on Coumadin for A. Fib.  Current Coumadin dose is Held Thurs otherwise 5 mg Mon and 2.5 mg ROW.   Concerns today are: None Noted.    A/P: Linwood Chi's INR is Supratherapeutic  for his/her goal range of 2 - 3.  Reasons why INR is out of range may include: unknown  Recommended to have Linwood Chi decrease Coumadin dose to hold Thurs then 2.5 mg daily and to have his/her INR rechecked in 1 week on 3/13.      Layla Garcia RN BC             OBJECTIVE    INR   Date Value Ref Range Status   03/06/2019 3.4 (A) 0.8 - 1.1 Final       ASSESSMENT / PLAN  No question data found.  Anticoagulation Summary  As of 3/6/2019    INR goal:   2.0-3.0   TTR:   92.4 % (2.9 y)   INR used for dosing:   3.4! (3/6/2019)   Warfarin maintenance plan:   5 mg (5 mg x 1) every Mon; 2.5 mg (5 mg x 0.5) all other days   Full warfarin instructions:   3/7: Hold; 3/11: 2.5 mg; Otherwise 5 mg every Mon; 2.5 mg all other days   Weekly warfarin total:   20 mg   Plan last modified:   Nannette Muhammad RN (4/11/2016)   Next INR check:   3/13/2019   Priority:   INR   Target end date:   Indefinite    Indications    Long-term (current) use of anticoagulants [Z79.01] [Z79.01]  Atrial fibrillation (HCC) [I48.91] [I48.91]             Anticoagulation Episode Summary     INR check location:       Preferred lab:       Send INR reminders to:   [facility]    Comments:         Anticoagulation Care Providers     Provider Role Specialty Phone number    Bernie Amaro MD [facility] [phone]            See the Encounter Report to view Anticoagulation Flowsheet and Dosing Calendar (Go to Encounters tab in chart review, and  find the Anticoagulation Therapy Visit)        Layla Garcia RN

## 2019-03-12 ENCOUNTER — TELEPHONE (OUTPATIENT)
Dept: FAMILY MEDICINE | Facility: CLINIC | Age: 84
End: 2019-03-12

## 2019-03-12 NOTE — TELEPHONE ENCOUNTER
Called and spoke with Anita from [facility]  and gave verbal OK for orders below.     Birgit Morales RN

## 2019-03-12 NOTE — TELEPHONE ENCOUNTER
Reason for call:  Home Healthcare Reason for Call:  Home Health Care    Anita with [facility] called regarding (reason for call): orders    Orders are needed for this patient. OT    PT: NA    OT: 2 visit for ADL for adaptive equipment training.    Skilled Nursing: NA    Pt Provider: Jesika    Phone Number Homecare Nurse can be reached at: [phone]    Can we leave a detailed message on this number? YES    Phone number patient can be reached at: Other phone number:  NA*    Best Time: ASAP    Call taken on 3/12/2019 at 4:02 PM by Rona Colin

## 2019-03-13 ENCOUNTER — ANTICOAGULATION THERAPY VISIT (OUTPATIENT)
Dept: FAMILY MEDICINE | Facility: CLINIC | Age: 84
End: 2019-03-13
Payer: COMMERCIAL

## 2019-03-13 DIAGNOSIS — I48.20 CHRONIC ATRIAL FIBRILLATION (H): ICD-10-CM

## 2019-03-13 LAB — INR PPP: 2.7 (ref 0.8–1.1)

## 2019-03-13 PROCEDURE — 99207 ZZC NO CHARGE NURSE ONLY: CPT | Performed by: FAMILY MEDICINE

## 2019-03-13 NOTE — PROGRESS NOTES
ANTICOAGULATION FOLLOW-UP CLINIC VISIT    Patient Name:  Linwood Chi  Date:  3/13/2019  Contact Type:  Telephone/ [phone]    SUBJECTIVE:     Patient Findings     Comments:   S/O:  Arven from [facility] care calls with patients INR today of 2.7.  Linwood Chi is on Coumadin for A. Fib.  Current Coumadin dose is Held 3/ then 2.5 mg daily .   Concerns today are: None Noted.    A/P: Linwood Chi's INR is Therapeutic  for his/her goal range of 2 - 3.  Reasons why INR is out of range may include: na  Recommended to have Linwood Chi take Coumadin 2.5 mg daily and to have his/her INR rechecked in 1 week on 3/20.      Layla Garcia RN BC             OBJECTIVE    INR   Date Value Ref Range Status   2019 2.7 (A) 0.8 - 1.1 Final       ASSESSMENT / PLAN  No question data found.  Anticoagulation Summary  As of 3/13/2019    INR goal:   2.0-3.0   TTR:   92.1 % (2.9 y)   INR used for dosin.7 (3/13/2019)   Warfarin maintenance plan:   2.5 mg (5 mg x 0.5) every day   Full warfarin instructions:   2.5 mg every day   Weekly warfarin total:   17.5 mg   Plan last modified:   Layla Garcia RN (3/13/2019)   Next INR check:   3/20/2019   Priority:   INR   Target end date:   Indefinite    Indications    Long-term (current) use of anticoagulants [Z79.01] [Z79.01]  Atrial fibrillation (HCC) [I48.91] [I48.91]             Anticoagulation Episode Summary     INR check location:       Preferred lab:       Send INR reminders to:   [facility]    Comments:         Anticoagulation Care Providers     Provider Role Specialty Phone number    Sony Canchola MD [facility] [phone]            See the Encounter Report to view Anticoagulation Flowsheet and Dosing Calendar (Go to Encounters tab in chart review, and find the Anticoagulation Therapy Visit)        Layla Garcia RN

## 2019-03-14 ENCOUNTER — MEDICAL CORRESPONDENCE (OUTPATIENT)
Dept: HEALTH INFORMATION MANAGEMENT | Facility: CLINIC | Age: 84
End: 2019-03-14

## 2019-03-20 ENCOUNTER — ANTICOAGULATION THERAPY VISIT (OUTPATIENT)
Dept: FAMILY MEDICINE | Facility: CLINIC | Age: 84
End: 2019-03-20
Payer: COMMERCIAL

## 2019-03-20 DIAGNOSIS — I48.20 CHRONIC ATRIAL FIBRILLATION (H): ICD-10-CM

## 2019-03-20 LAB — INR PPP: 2.4 (ref 0.8–1.1)

## 2019-03-20 PROCEDURE — 99207 ZZC NO CHARGE NURSE ONLY: CPT | Performed by: FAMILY MEDICINE

## 2019-03-20 NOTE — PROGRESS NOTES
ANTICOAGULATION FOLLOW-UP CLINIC VISIT    Patient Name:  Linwood Chi  Date:  3/20/2019  Contact Type:  Telephone    SUBJECTIVE:     Patient Findings     Comments:   S/O:  Mary from [facility] home care calls with patients INR today of 2.4.  Linwood Chi is on Coumadin for A. Fib.  Current Coumadin dose is 2.5 mg daily.   Concerns today are: None Noted.    A/P: Linwood Chi's INR is Therapeutic  for his/her goal range of 2 - 3.  Reasons why INR is out of range may include: na  Recommended to have Linwood Chi remain on the same Coumadin dose and to have his/her INR rechecked in 2 weeks on 4/3.      Layla Garcia RN BC           OBJECTIVE    INR   Date Value Ref Range Status   2019 2.4 (A) 0.8 - 1.1 Final       ASSESSMENT / PLAN  No question data found.  Anticoagulation Summary  As of 3/20/2019    INR goal:   2.0-3.0   TTR:   92.1 % (2.9 y)   INR used for dosin.4 (3/20/2019)   Warfarin maintenance plan:   2.5 mg (5 mg x 0.5) every day   Full warfarin instructions:   2.5 mg every day   Weekly warfarin total:   17.5 mg   Plan last modified:   Layla Garcia RN (3/13/2019)   Next INR check:   4/3/2019   Priority:   INR   Target end date:   Indefinite    Indications    Long-term (current) use of anticoagulants [Z79.01] [Z79.01]  Atrial fibrillation (HCC) [I48.91] [I48.91]             Anticoagulation Episode Summary     INR check location:       Preferred lab:       Send INR reminders to:   [facility]    Comments:         Anticoagulation Care Providers     Provider Role Specialty Phone number    Jesika Sony Vásquez MD [facility] [phone]            See the Encounter Report to view Anticoagulation Flowsheet and Dosing Calendar (Go to Encounters tab in chart review, and find the Anticoagulation Therapy Visit)        Layla Garcia RN

## 2019-03-25 ENCOUNTER — MEDICAL CORRESPONDENCE (OUTPATIENT)
Dept: HEALTH INFORMATION MANAGEMENT | Facility: CLINIC | Age: 84
End: 2019-03-25

## 2019-03-29 ENCOUNTER — TRANSFERRED RECORDS (OUTPATIENT)
Dept: HEALTH INFORMATION MANAGEMENT | Facility: CLINIC | Age: 84
End: 2019-03-29

## 2019-03-29 ENCOUNTER — MEDICAL CORRESPONDENCE (OUTPATIENT)
Dept: HEALTH INFORMATION MANAGEMENT | Facility: CLINIC | Age: 84
End: 2019-03-29

## 2019-04-01 DIAGNOSIS — N18.30 CKD (CHRONIC KIDNEY DISEASE) STAGE 3, GFR 30-59 ML/MIN (H): ICD-10-CM

## 2019-04-03 ENCOUNTER — ANTICOAGULATION THERAPY VISIT (OUTPATIENT)
Dept: FAMILY MEDICINE | Facility: CLINIC | Age: 84
End: 2019-04-03
Payer: COMMERCIAL

## 2019-04-03 DIAGNOSIS — I48.20 CHRONIC ATRIAL FIBRILLATION (H): ICD-10-CM

## 2019-04-03 LAB — INR PPP: 2 (ref 0.8–1.1)

## 2019-04-03 PROCEDURE — 99207 ZZC NO CHARGE NURSE ONLY: CPT | Performed by: FAMILY MEDICINE

## 2019-04-03 RX ORDER — LISINOPRIL 20 MG/1
TABLET ORAL
Qty: 90 TABLET | Refills: 2 | Status: SHIPPED | OUTPATIENT
Start: 2019-04-03 | End: 2019-12-16

## 2019-04-03 NOTE — PROGRESS NOTES
ANTICOAGULATION FOLLOW-UP CLINIC VISIT    Patient Name:  Linwood Chi  Date:  4/3/2019  Contact Type:  Telephone    SUBJECTIVE:     Patient Findings     Comments:   S/O:  Mary from [facility] home care calls with patients INR today of 2.0.  Linwood Chi is on Coumadin for A. Fib.  Current Coumadin dose is 2.5 mg daily.   Concerns today are: patient is being discharged from home care today    A/P: Linwood Chi's INR is Therapeutic  for his/her goal range of 2 - 3.  Reasons why INR is out of range may include: na  Recommended to have Linwood Chi remain on the same Coumadin dose and to have his/her INR rechecked in 3 weeks on .      Layla Garcia RN BC             OBJECTIVE    INR   Date Value Ref Range Status   2019 2.0 (A) 0.8 - 1.1 Final       ASSESSMENT / PLAN  No question data found.  Anticoagulation Summary  As of 4/3/2019    INR goal:   2.0-3.0   TTR:   92.2 % (3 y)   INR used for dosin.0 (4/3/2019)   Warfarin maintenance plan:   2.5 mg (5 mg x 0.5) every day   Full warfarin instructions:   2.5 mg every day   Weekly warfarin total:   17.5 mg   No change documented:   Layla Garcia RN   Plan last modified:   Layla Garcia RN (3/13/2019)   Next INR check:      Priority:   INR   Target end date:   Indefinite    Indications    Long-term (current) use of anticoagulants [Z79.01] [Z79.01]  Atrial fibrillation (HCC) [I48.91] [I48.91]             Anticoagulation Episode Summary     INR check location:       Preferred lab:       Send INR reminders to:   [facility]    Comments:         Anticoagulation Care Providers     Provider Role Specialty Phone number    BibianamathieuSony MD [facility] Practice [phone]            See the Encounter Report to view Anticoagulation Flowsheet and Dosing Calendar (Go to Encounters tab in chart review, and find the Anticoagulation Therapy Visit)        Layla Garcia RN

## 2019-04-18 DIAGNOSIS — E11.21 TYPE 2 DIABETES MELLITUS WITH DIABETIC NEPHROPATHY, WITHOUT LONG-TERM CURRENT USE OF INSULIN (H): ICD-10-CM

## 2019-04-24 ENCOUNTER — ANTICOAGULATION THERAPY VISIT (OUTPATIENT)
Dept: NURSING | Facility: CLINIC | Age: 84
End: 2019-04-24
Payer: COMMERCIAL

## 2019-04-24 DIAGNOSIS — N18.30 CKD (CHRONIC KIDNEY DISEASE) STAGE 3, GFR 30-59 ML/MIN (H): ICD-10-CM

## 2019-04-24 DIAGNOSIS — I48.20 CHRONIC ATRIAL FIBRILLATION (H): ICD-10-CM

## 2019-04-24 LAB — INR POINT OF CARE: 2.2 (ref 0.86–1.14)

## 2019-04-24 PROCEDURE — 99207 ZZC NO CHARGE NURSE ONLY: CPT

## 2019-04-24 PROCEDURE — 85610 PROTHROMBIN TIME: CPT | Mod: QW

## 2019-04-24 PROCEDURE — 36416 COLLJ CAPILLARY BLOOD SPEC: CPT

## 2019-04-24 NOTE — PATIENT INSTRUCTIONS
[facility]:  Please call [phone] to cancel and/or reschedule your appointment   Please call [phone] with any problems or questions regarding your Warfarin therapy.

## 2019-04-24 NOTE — PROGRESS NOTES
ANTICOAGULATION FOLLOW-UP CLINIC VISIT    Patient Name:  Linwood Chi  Date:  2019  Contact Type:  Face to Face    SUBJECTIVE:     Patient Findings     Comments:   Bleeding Signs/Symptoms: NO  Thromboembolic Signs/Symptoms: NO     Medication Changes:  NO  Dietary Changes:  NO  Bacterial/Viral Infection: NO     Missed Coumadin Doses: NO  Other Concerns:  NO             OBJECTIVE    INR Protime   Date Value Ref Range Status   2019 2.2 (A) 0.86 - 1.14 Final       ASSESSMENT / PLAN  No question data found.  Anticoagulation Summary  As of 2019    INR goal:   2.0-3.0   TTR:   92.4 % (3 y)   INR used for dosin.2 (2019)   Warfarin maintenance plan:   2.5 mg (5 mg x 0.5) every day   Full warfarin instructions:   2.5 mg every day   Weekly warfarin total:   17.5 mg   No change documented:   Layla Garcia RN   Plan last modified:   Layla Garcia RN (3/13/2019)   Next INR check:   2019   Priority:   INR   Target end date:   Indefinite    Indications    Long-term (current) use of anticoagulants [Z79.01] [Z79.01]  Atrial fibrillation (HCC) [I48.91] [I48.91]             Anticoagulation Episode Summary     INR check location:       Preferred lab:       Send INR reminders to:   [facility]    Comments:         Anticoagulation Care Providers     Provider Role Specialty Phone number    PranavmitchSony MD [facility] Practice [phone]            See the Encounter Report to view Anticoagulation Flowsheet and Dosing Calendar (Go to Encounters tab in chart review, and find the Anticoagulation Therapy Visit)        Layla Garcia RN

## 2019-04-25 RX ORDER — AMLODIPINE BESYLATE 5 MG/1
TABLET ORAL
Qty: 90 TABLET | Refills: 2 | Status: SHIPPED | OUTPATIENT
Start: 2019-04-25 | End: 2019-11-07

## 2019-05-08 DIAGNOSIS — I10 BENIGN ESSENTIAL HYPERTENSION: ICD-10-CM

## 2019-05-08 DIAGNOSIS — K21.9 GASTROESOPHAGEAL REFLUX DISEASE WITHOUT ESOPHAGITIS: ICD-10-CM

## 2019-05-08 DIAGNOSIS — E78.5 HYPERLIPIDEMIA WITH TARGET LDL LESS THAN 100: ICD-10-CM

## 2019-05-09 RX ORDER — ATORVASTATIN CALCIUM 40 MG/1
TABLET, FILM COATED ORAL
Qty: 90 TABLET | Refills: 2 | Status: SHIPPED | OUTPATIENT
Start: 2019-05-09 | End: 2020-02-04

## 2019-05-22 ENCOUNTER — ANTICOAGULATION THERAPY VISIT (OUTPATIENT)
Dept: NURSING | Facility: CLINIC | Age: 84
End: 2019-05-22
Payer: COMMERCIAL

## 2019-05-22 DIAGNOSIS — I48.20 CHRONIC ATRIAL FIBRILLATION (H): ICD-10-CM

## 2019-05-22 LAB — INR POINT OF CARE: 2.2 (ref 0.86–1.14)

## 2019-05-22 PROCEDURE — 36416 COLLJ CAPILLARY BLOOD SPEC: CPT

## 2019-05-22 PROCEDURE — 99207 ZZC NO CHARGE NURSE ONLY: CPT

## 2019-05-22 PROCEDURE — 85610 PROTHROMBIN TIME: CPT | Mod: QW

## 2019-05-22 NOTE — PROGRESS NOTES
ANTICOAGULATION FOLLOW-UP CLINIC VISIT    Patient Name:  Linwood Chi  Date:  2019  Contact Type:  Face to Face    SUBJECTIVE:  Patient Findings         Clinical Outcomes     Negatives:   Major bleeding event, Thromboembolic event, Anticoagulation-related hospital admission, Anticoagulation-related ED visit, Anticoagulation-related fatality           OBJECTIVE    INR Protime   Date Value Ref Range Status   2019 2.2 (A) 0.86 - 1.14 Final       ASSESSMENT / PLAN  No question data found.  Anticoagulation Summary  As of 2019    INR goal:   2.0-3.0   TTR:   92.6 % (3.1 y)   INR used for dosin.2 (2019)   Warfarin maintenance plan:   2.5 mg (5 mg x 0.5) every day   Full warfarin instructions:   2.5 mg every day   Weekly warfarin total:   17.5 mg   No change documented:   Layla Garcia RN   Plan last modified:   Layla Garcia RN (3/13/2019)   Next INR check:   2019   Priority:   INR   Target end date:   Indefinite    Indications    Long-term (current) use of anticoagulants [Z79.01] [Z79.01]  Atrial fibrillation (HCC) [I48.91] [I48.91]             Anticoagulation Episode Summary     INR check location:       Preferred lab:       Send INR reminders to:   [facility]    Comments:         Anticoagulation Care Providers     Provider Role Specialty Phone number    Sony Canchola MD [facility] Practice [phone]            See the Encounter Report to view Anticoagulation Flowsheet and Dosing Calendar (Go to Encounters tab in chart review, and find the Anticoagulation Therapy Visit)        Layla Garcia RN

## 2019-05-22 NOTE — PATIENT INSTRUCTIONS
[facility]:  Please call [phone] to cancel and/or reschedule your appointment   Please call [phone] with any problems or questions regarding your Warfarin therapy.

## 2019-06-19 ENCOUNTER — ANTICOAGULATION THERAPY VISIT (OUTPATIENT)
Dept: NURSING | Facility: CLINIC | Age: 84
End: 2019-06-19
Payer: COMMERCIAL

## 2019-06-19 DIAGNOSIS — Z79.01 LONG TERM CURRENT USE OF ANTICOAGULANT THERAPY: ICD-10-CM

## 2019-06-19 DIAGNOSIS — I48.20 CHRONIC ATRIAL FIBRILLATION (H): ICD-10-CM

## 2019-06-19 LAB — INR POINT OF CARE: 2.4 (ref 0.86–1.14)

## 2019-06-19 PROCEDURE — 99207 ZZC NO CHARGE NURSE ONLY: CPT

## 2019-06-19 PROCEDURE — 85610 PROTHROMBIN TIME: CPT | Mod: QW

## 2019-06-19 PROCEDURE — 36416 COLLJ CAPILLARY BLOOD SPEC: CPT

## 2019-06-19 NOTE — PROGRESS NOTES
ANTICOAGULATION FOLLOW-UP CLINIC VISIT    Patient Name:  Linwood Chi  Date:  2019  Contact Type:  Face to Face    SUBJECTIVE:  Patient Findings         Clinical Outcomes     Negatives:   Major bleeding event, Thromboembolic event, Anticoagulation-related hospital admission, Anticoagulation-related ED visit, Anticoagulation-related fatality           OBJECTIVE    INR Protime   Date Value Ref Range Status   2019 2.4 (A) 0.86 - 1.14 Final       ASSESSMENT / PLAN  No question data found.  Anticoagulation Summary  As of 2019    INR goal:   2.0-3.0   TTR:   92.7 % (3.2 y)   INR used for dosin.4 (2019)   Warfarin maintenance plan:   2.5 mg (5 mg x 0.5) every day   Full warfarin instructions:   2.5 mg every day   Weekly warfarin total:   17.5 mg   No change documented:   Layla Garcia RN   Plan last modified:   Layla Garcia RN (3/13/2019)   Next INR check:   2019   Priority:   INR   Target end date:   Indefinite    Indications    Long-term (current) use of anticoagulants [Z79.01] [Z79.01]  Atrial fibrillation (HCC) [I48.91] [I48.91]             Anticoagulation Episode Summary     INR check location:       Preferred lab:       Send INR reminders to:   [facility]    Comments:         Anticoagulation Care Providers     Provider Role Specialty Phone number    Sony Canchola MD [facility] Practice [phone]            See the Encounter Report to view Anticoagulation Flowsheet and Dosing Calendar (Go to Encounters tab in chart review, and find the Anticoagulation Therapy Visit)        Layla Garcia RN

## 2019-07-09 DIAGNOSIS — I48.91 ATRIAL FIBRILLATION, UNSPECIFIED TYPE (H): ICD-10-CM

## 2019-07-09 RX ORDER — WARFARIN SODIUM 5 MG/1
TABLET ORAL
Qty: 45 TABLET | Refills: 0 | Status: SHIPPED | OUTPATIENT
Start: 2019-07-09 | End: 2019-07-24

## 2019-07-09 NOTE — TELEPHONE ENCOUNTER
Left VM, asking Linwood of he would like to change to 2.5mg tablets in the future to avoid having to split tablets.  Will refill with 5mg tabs this time, encouraged to discuss with ACC RN at next visit.    Anita Banda, PharmD BCACP  Anticoagulation Clinical Pharmacist

## 2019-07-24 ENCOUNTER — ANTICOAGULATION THERAPY VISIT (OUTPATIENT)
Dept: NURSING | Facility: CLINIC | Age: 84
End: 2019-07-24
Payer: COMMERCIAL

## 2019-07-24 DIAGNOSIS — I48.91 ATRIAL FIBRILLATION, UNSPECIFIED TYPE (H): ICD-10-CM

## 2019-07-24 DIAGNOSIS — I48.20 CHRONIC ATRIAL FIBRILLATION (H): ICD-10-CM

## 2019-07-24 DIAGNOSIS — Z79.01 LONG TERM CURRENT USE OF ANTICOAGULANT THERAPY: ICD-10-CM

## 2019-07-24 LAB — INR POINT OF CARE: 1.6 (ref 0.86–1.14)

## 2019-07-24 PROCEDURE — 36416 COLLJ CAPILLARY BLOOD SPEC: CPT

## 2019-07-24 PROCEDURE — 85610 PROTHROMBIN TIME: CPT | Mod: QW

## 2019-07-24 PROCEDURE — 99207 ZZC NO CHARGE NURSE ONLY: CPT

## 2019-07-24 RX ORDER — WARFARIN SODIUM 2.5 MG/1
2.5 TABLET ORAL DAILY
Qty: 90 TABLET | Refills: 0 | Status: SHIPPED | OUTPATIENT
Start: 2019-07-24 | End: 2020-02-04

## 2019-07-24 NOTE — PATIENT INSTRUCTIONS
[facility]:  Please call [phone] to cancel and/or reschedule your appointment   Please call [phone] with any problems or questions regarding your Warfarin therapy.    Some signs and symptoms of clots include: pain or tenderness in arm or leg, swelling in arm or leg, changes in skin color, or area is warm to touch, shortness or breath, trouble breathing.  Numbness or weakness especially on 1 side of the body, sudden trouble speaking or swallowing, sudden trouble seeing, sudden confusion, dizzy spells or headache.  If you have these please call 911 or seek medical care immediately.

## 2019-07-24 NOTE — PROGRESS NOTES
ANTICOAGULATION FOLLOW-UP CLINIC VISIT    Patient Name:  Linwood Chi  Date:  2019  Contact Type:  Face to Face    SUBJECTIVE:  Patient Findings     Positives:   Change in health    Comments:   Patient is c/o left hip pain. Is taking tylenol for the pain.         Clinical Outcomes     Negatives:   Major bleeding event, Thromboembolic event, Anticoagulation-related hospital admission, Anticoagulation-related ED visit, Anticoagulation-related fatality    Comments:   Patient is c/o left hip pain. Is taking tylenol for the pain.            OBJECTIVE    INR Protime   Date Value Ref Range Status   2019 1.6 (A) 0.86 - 1.14 Final       ASSESSMENT / PLAN  No question data found.  Anticoagulation Summary  As of 2019    INR goal:   2.0-3.0   TTR:   91.5 % (3.3 y)   INR used for dosin.6! (2019)   Warfarin maintenance plan:   2.5 mg (5 mg x 0.5) every day   Full warfarin instructions:   : 5 mg; Otherwise 2.5 mg every day   Weekly warfarin total:   17.5 mg   Plan last modified:   Layla Garcia RN (3/13/2019)   Next INR check:   2019   Priority:   INR   Target end date:   Indefinite    Indications    Long-term (current) use of anticoagulants [Z79.01] [Z79.01]  Atrial fibrillation (HCC) [I48.91] [I48.91]             Anticoagulation Episode Summary     INR check location:       Preferred lab:       Send INR reminders to:   [facility]    Comments:         Anticoagulation Care Providers     Provider Role Specialty Phone number    Yacedqp Sony Vásquez MD [facility] [phone]            See the Encounter Report to view Anticoagulation Flowsheet and Dosing Calendar (Go to Encounters tab in chart review, and find the Anticoagulation Therapy Visit)        Layla Garcia RN

## 2019-08-07 ENCOUNTER — ANTICOAGULATION THERAPY VISIT (OUTPATIENT)
Dept: NURSING | Facility: CLINIC | Age: 84
End: 2019-08-07
Payer: COMMERCIAL

## 2019-08-07 DIAGNOSIS — Z79.01 LONG TERM CURRENT USE OF ANTICOAGULANT THERAPY: ICD-10-CM

## 2019-08-07 DIAGNOSIS — I48.20 CHRONIC ATRIAL FIBRILLATION (H): ICD-10-CM

## 2019-08-07 LAB — INR POINT OF CARE: 2.3 (ref 0.86–1.14)

## 2019-08-07 PROCEDURE — 99207 ZZC NO CHARGE NURSE ONLY: CPT

## 2019-08-07 PROCEDURE — 36416 COLLJ CAPILLARY BLOOD SPEC: CPT

## 2019-08-07 PROCEDURE — 85610 PROTHROMBIN TIME: CPT | Mod: QW

## 2019-08-07 NOTE — PATIENT INSTRUCTIONS
[facility]:  Please call [phone] to cancel and/or reschedule your appointment   Please call [phone] with any problems or questions regarding your Warfarin therapy.

## 2019-08-07 NOTE — PROGRESS NOTES
ANTICOAGULATION FOLLOW-UP CLINIC VISIT    Patient Name:  Linwood Chi  Date:  2019  Contact Type:  Face to Face    SUBJECTIVE:  Patient Findings         Clinical Outcomes     Negatives:   Major bleeding event, Thromboembolic event, Anticoagulation-related hospital admission, Anticoagulation-related ED visit, Anticoagulation-related fatality           OBJECTIVE    INR Protime   Date Value Ref Range Status   2019 2.3 (A) 0.86 - 1.14 Final       ASSESSMENT / PLAN  No question data found.  Anticoagulation Summary  As of 2019    INR goal:   2.0-3.0   TTR:   90.9 % (3.3 y)   INR used for dosin.3 (2019)   Warfarin maintenance plan:   2.5 mg (5 mg x 0.5) every day   Full warfarin instructions:   2.5 mg every day   Weekly warfarin total:   17.5 mg   No change documented:   Layla Garcia RN   Plan last modified:   Layla Garcia RN (3/13/2019)   Next INR check:   2019   Priority:   INR   Target end date:   Indefinite    Indications    Long-term (current) use of anticoagulants [Z79.01] [Z79.01]  Atrial fibrillation (HCC) [I48.91] [I48.91]             Anticoagulation Episode Summary     INR check location:       Preferred lab:       Send INR reminders to:   [facility]    Comments:         Anticoagulation Care Providers     Provider Role Specialty Phone number    Sony Canchola MD [facility] [phone]            See the Encounter Report to view Anticoagulation Flowsheet and Dosing Calendar (Go to Encounters tab in chart review, and find the Anticoagulation Therapy Visit)        Layla Garcia RN

## 2019-09-04 ENCOUNTER — ANTICOAGULATION THERAPY VISIT (OUTPATIENT)
Dept: NURSING | Facility: CLINIC | Age: 84
End: 2019-09-04
Payer: COMMERCIAL

## 2019-09-04 DIAGNOSIS — Z79.01 LONG TERM CURRENT USE OF ANTICOAGULANT THERAPY: ICD-10-CM

## 2019-09-04 DIAGNOSIS — I48.20 CHRONIC ATRIAL FIBRILLATION (H): ICD-10-CM

## 2019-09-04 LAB — INR POINT OF CARE: 2.2 (ref 0.86–1.14)

## 2019-09-04 PROCEDURE — 85610 PROTHROMBIN TIME: CPT | Mod: QW

## 2019-09-04 PROCEDURE — 36416 COLLJ CAPILLARY BLOOD SPEC: CPT

## 2019-09-04 PROCEDURE — 99207 ZZC NO CHARGE NURSE ONLY: CPT

## 2019-09-04 NOTE — PATIENT INSTRUCTIONS
[facility]:  Please call [phone] with any problems or questions regarding your Warfarin therapy.

## 2019-09-04 NOTE — PROGRESS NOTES
ANTICOAGULATION FOLLOW-UP CLINIC VISIT    Patient Name:  Linwood Chi  Date:  2019  Contact Type:  Face to Face    SUBJECTIVE:  Patient Findings     Comments:   Bleeding Signs/Symptoms: NO  Thromboembolic Signs/Symptoms: NO     Medication Changes:  NO  Dietary Changes:  NO  Bacterial/Viral Infection: NO     Missed Coumadin Doses: NO  Other Concerns:  NO          Clinical Outcomes     Negatives:   Major bleeding event, Thromboembolic event, Anticoagulation-related hospital admission, Anticoagulation-related ED visit, Anticoagulation-related fatality    Comments:   Bleeding Signs/Symptoms: NO  Thromboembolic Signs/Symptoms: NO     Medication Changes:  NO  Dietary Changes:  NO  Bacterial/Viral Infection: NO     Missed Coumadin Doses: NO  Other Concerns:  NO             OBJECTIVE    INR Protime   Date Value Ref Range Status   2019 2.2 (A) 0.86 - 1.14 Final       ASSESSMENT / PLAN  No question data found.  Anticoagulation Summary  As of 2019    INR goal:   2.0-3.0   TTR:   91.1 % (3.4 y)   INR used for dosin.2 (2019)   Warfarin maintenance plan:   2.5 mg (5 mg x 0.5) every day   Full warfarin instructions:   2.5 mg every day   Weekly warfarin total:   17.5 mg   No change documented:   Layla Garcia RN   Plan last modified:   Layla Garcia RN (3/13/2019)   Next INR check:      Priority:   INR   Target end date:   Indefinite    Indications    Long-term (current) use of anticoagulants [Z79.01] [Z79.01]  Atrial fibrillation (HCC) [I48.91] [I48.91]             Anticoagulation Episode Summary     INR check location:       Preferred lab:       Send INR reminders to:   SANDOR LILLY    Comments:         Anticoagulation Care Providers     Provider Role Specialty Phone number    Sony Canchola MD [facility] [phone]            See the Encounter Report to view Anticoagulation Flowsheet and Dosing Calendar (Go to Encounters tab in chart review, and find the  Anticoagulation Therapy Visit)        Layla Garcia RN

## 2019-09-06 NOTE — PROGRESS NOTES
Subjective     Linwood Chi is a 89 year old male who presents to clinic today for the following health issues:    HPI   Musculoskeletal problem/pain      Duration: 2 months    Description  Location: Bilateral legs, but left is worse    Intensity:  moderate    Accompanying signs and symptoms: radiation of pain to from hip to toes    History  Previous similar problem: YES- crushed knee years ago  Previous evaluation:  yes    Precipitating or alleviating factors:  Trauma or overuse: no   Aggravating factors include: standing    Therapies tried and outcome: nothing    -------------------------------------    Had surgery in the back in the past  Pain starts in the back, down the hips and all the way to the toes.  Uses walker for ambulation; had vertebroplasty due to T12 compression fracture  Had surgery for Abdominal Aortic Aneurysm 2/15/2019    Diabetes Follow-up      How often are you checking your blood sugar? One time daily    What time of day are you checking your blood sugars (select all that apply)?  Before meals    Have you had any blood sugars above 200?  No    Have you had any blood sugars below 70?  No    What symptoms do you notice when your blood sugar is low?  None    What concerns do you have today about your diabetes? None     Do you have any of these symptoms? (Select all that apply)  No numbness or tingling in feet.  No redness, sores or blisters on feet.  No complaints of excessive thirst.  No reports of blurry vision.  No significant changes to weight.     Have you had a diabetic eye exam in the last 12 months? No    BP Readings from Last 2 Encounters:   09/09/19 124/76   02/18/19 120/66     Hemoglobin A1C (%)   Date Value   02/06/2019 6.0 (H)   12/27/2018 6.2     LDL Cholesterol Calculated (mg/dL)   Date Value   02/06/2019 51   02/12/2018 59       Diabetes Management Resources      Chronic Kidney Disease Follow-up      Current NSAID use?  No    History of AAA:    Last seen in 3/2019    Was to  follow up with [facility] in 6 months.   Former smoker.    PROBLEMS TO ADD ON...  Reviewed and updated as needed this visit by Provider      Review of Systems   HEENT, cardiovascular, pulmonary, gi and gu systems are negative, except as otherwise noted.      Objective    /76   Pulse 62   Temp 96.9  F (36.1  C) (Oral)   Wt 73 kg (161 lb)   SpO2 99%   BMI 25.99 kg/m    Body mass index is 25.99 kg/m .  Physical Exam   GENERAL: elderly frail male walking with walker, alert and no distress  RESP: lungs clear to auscultation - no rales, rhonchi or wheezes  CV: regular rate and rhythm,  no murmur, click or rub, no peripheral edema   Comprehensive back pain exam:  Tenderness of lower para lumbar, Range of motion not limited by pain, Lower extremity strength functional and equal on both sides, Lower extremity reflexes within normal limits bilaterally, Lower extremity sensation normal and equal on both sides and Straight leg raise negative bilaterally.  NEURO: Normal strength and tone, mentation intact and speech normal  Diabetic foot exam: normal monofilament exam, DP absent bilateral, PT absent bilateral and onychomycosis    Diagnostic Test Results:  Labs reviewed in Epic        Assessment & Plan     Linwood was seen today for musculoskeletal problem.    Diagnoses and all orders for this visit:    Chronic bilateral low back pain, with sciatica presence unspecified    X ray shows narrowing of L5-S1. Tylenol and analgesic gel  -     XR Lumbar Spine 2/3 Views  -     acetaminophen (TYLENOL) 500 MG tablet; Take 1-2 tablets (500-1,000 mg) by mouth every 8 hours as needed for mild pain ((no more than 3000 mg in 24 hours))  -     lidocaine (XYLOCAINE) 2 % external gel; Apply topically 3 times daily    CKD (chronic kidney disease) stage 3, GFR 30-59 ml/min (H)       -     Stable    Type 2 diabetes mellitus with stage 3 chronic kidney disease, unspecified whether long term insulin use (H)  -     ALT  -     Albumin Random Urine  Quantitative with Creat Ratio  -     HEMOGLOBIN A1C  -     BASIC METABOLIC PANEL  -     RetinaVue Eye Scan; Future  -     RetinaVue Eye Scan    Onychomycosis        -  Extensive fungal nail involvement. Benefits and risks of medication discussed; would like to defer.    History of AAA (abdominal aortic aneurysm) repair        -     Due for 6 month follow up.    Permanent atrial fibrillation (H)        -    On chronic anticoagulation.    Other orders  -     PAF COMPLETED    Return in about 1 month (around 10/9/2019) for Wellness follow up, Follow up for symptoms recheck.    Sony Canchola MD  [facility]

## 2019-09-09 ENCOUNTER — OFFICE VISIT (OUTPATIENT)
Dept: FAMILY MEDICINE | Facility: CLINIC | Age: 84
End: 2019-09-09
Payer: COMMERCIAL

## 2019-09-09 ENCOUNTER — ANCILLARY PROCEDURE (OUTPATIENT)
Dept: GENERAL RADIOLOGY | Facility: CLINIC | Age: 84
End: 2019-09-09
Attending: FAMILY MEDICINE
Payer: COMMERCIAL

## 2019-09-09 ENCOUNTER — TRANSFERRED RECORDS (OUTPATIENT)
Dept: MULTI SPECIALTY CLINIC | Facility: CLINIC | Age: 84
End: 2019-09-09

## 2019-09-09 VITALS
BODY MASS INDEX: 25.99 KG/M2 | HEART RATE: 62 BPM | DIASTOLIC BLOOD PRESSURE: 76 MMHG | WEIGHT: 161 LBS | TEMPERATURE: 96.9 F | OXYGEN SATURATION: 99 % | SYSTOLIC BLOOD PRESSURE: 124 MMHG

## 2019-09-09 DIAGNOSIS — Z98.890 HISTORY OF AAA (ABDOMINAL AORTIC ANEURYSM) REPAIR: ICD-10-CM

## 2019-09-09 DIAGNOSIS — E11.22 TYPE 2 DIABETES MELLITUS WITH STAGE 3 CHRONIC KIDNEY DISEASE, UNSPECIFIED WHETHER LONG TERM INSULIN USE: ICD-10-CM

## 2019-09-09 DIAGNOSIS — N18.3 TYPE 2 DIABETES MELLITUS WITH STAGE 3 CHRONIC KIDNEY DISEASE, UNSPECIFIED WHETHER LONG TERM INSULIN USE: ICD-10-CM

## 2019-09-09 DIAGNOSIS — G89.29 CHRONIC BILATERAL LOW BACK PAIN, WITH SCIATICA PRESENCE UNSPECIFIED: Primary | ICD-10-CM

## 2019-09-09 DIAGNOSIS — B35.1 ONYCHOMYCOSIS: ICD-10-CM

## 2019-09-09 DIAGNOSIS — H35.9 RETINAL DISORDER, RIGHT: ICD-10-CM

## 2019-09-09 DIAGNOSIS — M54.5 CHRONIC BILATERAL LOW BACK PAIN, WITH SCIATICA PRESENCE UNSPECIFIED: Primary | ICD-10-CM

## 2019-09-09 DIAGNOSIS — I48.21 PERMANENT ATRIAL FIBRILLATION (H): ICD-10-CM

## 2019-09-09 DIAGNOSIS — N18.30 CKD (CHRONIC KIDNEY DISEASE) STAGE 3, GFR 30-59 ML/MIN (H): ICD-10-CM

## 2019-09-09 LAB
ALT SERPL W P-5'-P-CCNC: 19 U/L (ref 0–70)
ANION GAP SERPL CALCULATED.3IONS-SCNC: 9 MMOL/L (ref 3–14)
BUN SERPL-MCNC: 17 MG/DL (ref 7–30)
CALCIUM SERPL-MCNC: 8.9 MG/DL (ref 8.5–10.1)
CHLORIDE SERPL-SCNC: 103 MMOL/L (ref 94–109)
CO2 SERPL-SCNC: 25 MMOL/L (ref 20–32)
CREAT SERPL-MCNC: 1.05 MG/DL (ref 0.66–1.25)
GFR SERPL CREATININE-BSD FRML MDRD: 63 ML/MIN/{1.73_M2}
GLUCOSE SERPL-MCNC: 91 MG/DL (ref 70–99)
HBA1C MFR BLD: 5.7 % (ref 0–5.6)
POTASSIUM SERPL-SCNC: 4.2 MMOL/L (ref 3.4–5.3)
RETINOPATHY: NORMAL
SODIUM SERPL-SCNC: 137 MMOL/L (ref 133–144)

## 2019-09-09 PROCEDURE — 83036 HEMOGLOBIN GLYCOSYLATED A1C: CPT | Performed by: FAMILY MEDICINE

## 2019-09-09 PROCEDURE — 80048 BASIC METABOLIC PNL TOTAL CA: CPT | Performed by: FAMILY MEDICINE

## 2019-09-09 PROCEDURE — 72100 X-RAY EXAM L-S SPINE 2/3 VWS: CPT

## 2019-09-09 PROCEDURE — 92250 FUNDUS PHOTOGRAPHY W/I&R: CPT | Mod: TC | Performed by: FAMILY MEDICINE

## 2019-09-09 PROCEDURE — 84460 ALANINE AMINO (ALT) (SGPT): CPT | Performed by: FAMILY MEDICINE

## 2019-09-09 PROCEDURE — 99207 C PAF COMPLETED  NO CHARGE: CPT | Performed by: FAMILY MEDICINE

## 2019-09-09 PROCEDURE — 82043 UR ALBUMIN QUANTITATIVE: CPT | Performed by: FAMILY MEDICINE

## 2019-09-09 PROCEDURE — 36415 COLL VENOUS BLD VENIPUNCTURE: CPT | Performed by: FAMILY MEDICINE

## 2019-09-09 PROCEDURE — 99214 OFFICE O/P EST MOD 30 MIN: CPT | Performed by: FAMILY MEDICINE

## 2019-09-09 PROCEDURE — 92250 FUNDUS PHOTOGRAPHY W/I&R: CPT | Mod: 26 | Performed by: FAMILY MEDICINE

## 2019-09-09 RX ORDER — ACETAMINOPHEN 500 MG
500-1000 TABLET ORAL EVERY 8 HOURS PRN
Qty: 60 TABLET | Refills: 1 | Status: SHIPPED | OUTPATIENT
Start: 2019-09-09

## 2019-09-09 RX ORDER — LIDOCAINE HYDROCHLORIDE 20 MG/ML
JELLY TOPICAL 3 TIMES DAILY
Qty: 60 ML | Refills: 1 | Status: SHIPPED | OUTPATIENT
Start: 2019-09-09

## 2019-09-09 NOTE — LETTER
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     My Weight Plan:   Wt Readings from Last 2 Encounters:   02/18/19 79.4 kg (175 lb)   02/06/19 78.8 kg (173 lb 12.8 oz)     Weigh yourself daily using the same scale. If you gain more than 2 pounds in 24 hours or 5 pounds in a week call the clinic    My Diet Goal: No added salt    Emergency Room Visits:    Our goal is to improve your quality of life and help you avoid a visit to the emergency room or hospital.  If we work together, we can achieve this goal. But, if you feel you need to call 911 or go to the emergency room, please do so.  If you go to the emergency room, please bring your list of medicines and your daily weight chart with you.       GREEN ZONE     Doing well today    Weight gained is no more than 2 pounds a day or 5 pounds a week.    No swelling in feet, ankles, legs or stomach.    No more swelling than usual.    No more trouble breathing than usual.    No change in my sleep.    No other problems. Actions:    I am doing fine.  I will take my medicine, follow my diet, see my doctor, exercise, and watch for symptoms.           YELLOW ZONE         Having a bad day or flare up    Weight gain of more than 2 pounds in one day or 5 pounds in one week.    New swelling in ankle, leg, knee or thigh.    Bloating in belly, pants feel tighter.    Swelling in hands or face.    Coughing or trouble breathing while walking or talking.    Harder to breathe last night.    Have trouble sleeping, wake up short of breath.    Much more tired than usual.    Not eating.    Pain in my chest or bad leg cramps.    Feel weak or dizzy. Actions:    I need to take action and call my doctor or nurse today.                  RED ZONE         Need medical care now    Weight gain of 5 pounds overnight.    Chest pain or pressure that does not go away.    Feel less alert.    Wheezing or have trouble breathing when at rest.    Cannot sleep lying down.    Cannot take my water pill.    Pass out or faint. Actions:    I need to call my doctor or nurse now!    Call 911 if I have chest pain or cannot breathe.

## 2019-09-09 NOTE — LETTER
[address] [place] [address]    September 10, 2019    Linwood Suttonon  [address] [facility] [id number]          Dear Linwood,    Diabetes well controlled, kidney function is normal though has leakage of protein in urine, worsened from last year.   Will continue to follow if persisting will consider follow up with kidney specialist     Results for orders placed or performed in visit on 09/09/19   XR Lumbar Spine 2/3 Views    Narrative    LUMBAR SPINE TWO-THREE  VIEWS  9/9/2019 1:13 PM     HISTORY: Chronic bilateral low back pain, with sciatica presence  unspecified; Chronic bilateral low back pain, with sciatica presence  unspecified    COMPARISON: None.    FINDINGS: There is scoliosis convex to the left. Patient is status  post a vertebroplasty at the T12 level. Is mild compression of the  inferior endplate of L1. The age of the compression fracture of the  inferior endplate of L1 is indeterminate. Grade 1 anterior  spondylolisthesis of L5 on S1. This appears to be due to degenerative  change in the facet joints. There is loss of disc space height at the  L4-5 and L5-S1 levels..      Impression    IMPRESSION:   1. T12 vertebroplasty.  2. Age-indeterminate compression fracture inferior endplate of L1.  3. Grade 1 anterior spondylolisthesis of L5 on S1.  4. Degenerative change with loss of disc space height at L4-5 and  L5-S1.  5. Patient status post placement of a aortic stent graft.    SWATI SANTIAGO MD   ALT   Result Value Ref Range    ALT 19 0 - 70 U/L   Albumin Random Urine Quantitative with Creat Ratio   Result Value Ref Range    Creatinine Urine 51 mg/dL    Albumin Urine mg/L 2,780 mg/L    Albumin Urine mg/g Cr 5,494.07 (H) 0 - 17 mg/g Cr   HEMOGLOBIN A1C   Result Value Ref Range    Hemoglobin A1C 5.7 (H) 0 - 5.6 %   BASIC METABOLIC PANEL   Result Value Ref Range    Sodium 137 133 - 144 mmol/L     Potassium 4.2 3.4 - 5.3 mmol/L    Chloride 103 94 - 109 mmol/L    Carbon Dioxide 25 20 - 32 mmol/L    Anion Gap 9 3 - 14 mmol/L    Glucose 91 70 - 99 mg/dL    Urea Nitrogen 17 7 - 30 mg/dL    Creatinine 1.05 0.66 - 1.25 mg/dL    GFR Estimate 63 >60 mL/min/[1.73_m2]    GFR Estimate If Black 72 >60 mL/min/[1.73_m2]    Calcium 8.9 8.5 - 10.1 mg/dL       If you have any questions or concerns, please me or my clinic team at [phone].      Sincerely,        Sony Canchola MD/bt

## 2019-09-09 NOTE — LETTER
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       Sincerely,         Sony Canchola MD/blt

## 2019-09-10 PROBLEM — R80.9 ALBUMINURIA: Status: ACTIVE | Noted: 2019-09-10

## 2019-09-10 LAB
CREAT UR-MCNC: 51 MG/DL
MICROALBUMIN UR-MCNC: 2780 MG/L
MICROALBUMIN/CREAT UR: 5494.07 MG/G CR (ref 0–17)

## 2019-10-09 ENCOUNTER — ANTICOAGULATION THERAPY VISIT (OUTPATIENT)
Dept: NURSING | Facility: CLINIC | Age: 84
End: 2019-10-09
Payer: COMMERCIAL

## 2019-10-09 DIAGNOSIS — Z79.01 LONG TERM CURRENT USE OF ANTICOAGULANT THERAPY: ICD-10-CM

## 2019-10-09 DIAGNOSIS — I48.21 PERMANENT ATRIAL FIBRILLATION (H): ICD-10-CM

## 2019-10-09 LAB — INR POINT OF CARE: 2.6 (ref 0.86–1.14)

## 2019-10-09 PROCEDURE — 85610 PROTHROMBIN TIME: CPT | Mod: QW

## 2019-10-09 PROCEDURE — 36416 COLLJ CAPILLARY BLOOD SPEC: CPT

## 2019-10-09 PROCEDURE — 99207 ZZC NO CHARGE NURSE ONLY: CPT

## 2019-10-09 NOTE — PROGRESS NOTES
ANTICOAGULATION FOLLOW-UP CLINIC VISIT    Patient Name:  Linwood Chi  Date:  10/9/2019  Contact Type:  Face to Face    SUBJECTIVE:  Patient Findings         Clinical Outcomes     Negatives:   Major bleeding event, Thromboembolic event, Anticoagulation-related hospital admission, Anticoagulation-related ED visit, Anticoagulation-related fatality           OBJECTIVE    INR Protime   Date Value Ref Range Status   10/09/2019 2.6 (A) 0.86 - 1.14 Final       ASSESSMENT / PLAN  No question data found.  Anticoagulation Summary  As of 10/9/2019    INR goal:   2.0-3.0   TTR:   91.4 % (3.5 y)   INR used for dosin.6 (10/9/2019)   Warfarin maintenance plan:   2.5 mg (2.5 mg x 1) every day   Full warfarin instructions:   2.5 mg every day   Weekly warfarin total:   17.5 mg   Plan last modified:   Layla Garcia RN (10/9/2019)   Next INR check:   2019   Priority:   INR   Target end date:   Indefinite    Indications    Long-term (current) use of anticoagulants [Z79.01] [Z79.01]  Atrial fibrillation (HCC) [I48.91] [I48.91]             Anticoagulation Episode Summary     INR check location:       Preferred lab:       Send INR reminders to:   SANDOR LILLY    Comments:         Anticoagulation Care Providers     Provider Role Specialty Phone number    Jesika Sony Vásquez MD [facility] [phone]            See the Encounter Report to view Anticoagulation Flowsheet and Dosing Calendar (Go to Encounters tab in chart review, and find the Anticoagulation Therapy Visit)        Layla Garcia RN

## 2019-10-09 NOTE — PATIENT INSTRUCTIONS
Anticoagulation Clinic:  Please call [phone] with any problems or questions regarding your Warfarin therapy.

## 2019-10-13 DIAGNOSIS — I48.91 ATRIAL FIBRILLATION, UNSPECIFIED TYPE (H): ICD-10-CM

## 2019-10-17 RX ORDER — WARFARIN SODIUM 2.5 MG/1
2.5 TABLET ORAL DAILY
Qty: 90 TABLET | Refills: 0 | Status: SHIPPED | OUTPATIENT
Start: 2019-10-17 | End: 2020-02-04

## 2019-11-01 ENCOUNTER — OFFICE VISIT (OUTPATIENT)
Dept: FAMILY MEDICINE | Facility: CLINIC | Age: 84
End: 2019-11-01
Payer: COMMERCIAL

## 2019-11-01 VITALS
HEIGHT: 64 IN | DIASTOLIC BLOOD PRESSURE: 82 MMHG | TEMPERATURE: 97.5 F | HEART RATE: 57 BPM | WEIGHT: 165 LBS | SYSTOLIC BLOOD PRESSURE: 136 MMHG | OXYGEN SATURATION: 98 % | BODY MASS INDEX: 28.17 KG/M2

## 2019-11-01 DIAGNOSIS — I10 BENIGN ESSENTIAL HYPERTENSION: ICD-10-CM

## 2019-11-01 DIAGNOSIS — I48.20 CHRONIC ATRIAL FIBRILLATION (H): ICD-10-CM

## 2019-11-01 DIAGNOSIS — I25.810 CORONARY ARTERY DISEASE INVOLVING CORONARY BYPASS GRAFT OF NATIVE HEART WITHOUT ANGINA PECTORIS: ICD-10-CM

## 2019-11-01 DIAGNOSIS — Z01.818 PREOP GENERAL PHYSICAL EXAM: Primary | ICD-10-CM

## 2019-11-01 DIAGNOSIS — H25.9 SENILE CATARACT OF RIGHT EYE, UNSPECIFIED AGE-RELATED CATARACT TYPE: ICD-10-CM

## 2019-11-01 PROCEDURE — 99214 OFFICE O/P EST MOD 30 MIN: CPT | Performed by: FAMILY MEDICINE

## 2019-11-01 ASSESSMENT — MIFFLIN-ST. JEOR: SCORE: 1324.44

## 2019-11-01 ASSESSMENT — PAIN SCALES - GENERAL: PAINLEVEL: NO PAIN (0)

## 2019-11-01 NOTE — PATIENT INSTRUCTIONS
Before Your Surgery      Call your surgeon if there is any change in your health. This includes signs of a cold or flu (such as a sore throat, runny nose, cough, rash or fever).    Do not smoke, drink alcohol or take over the counter medicine (unless your surgeon or primary care doctor tells you to) for the 24 hours before and after surgery.    If you take prescribed drugs: Follow your doctor s orders about which medicines to take and which to stop until after surgery.    Eating and drinking prior to surgery: follow the instructions from your surgeon    Take a shower or bath the night before surgery. Use the soap your surgeon gave you to gently clean your skin. If you do not have soap from your surgeon, use your regular soap. Do not shave or scrub the surgery site.  Wear clean pajamas and have clean sheets on your bed.     Hold Metformin morning of surgery.

## 2019-11-01 NOTE — PROGRESS NOTES
[address] [address]  [phone]  Dept: [phone]    PRE-OP EVALUATION:  Today's date: 2019    Linwood Chi (: 1930) presents for pre-operative evaluation assessment as requested by Dr. Conway.  He requires evaluation and anesthesia risk assessment prior to undergoing surgery/procedure for treatment of eye .    Proposed Surgery/ Procedure: Cataract  Date of Surgery/ Procedure: 2019  Time of Surgery/ Procedure: 8:00 AM  Hospital/Surgical Facility: [facility]  Fax number for surgical facility:   Primary Physician: Snoy Canchola  Type of Anesthesia Anticipated: to be determined    Patient has a Health Care Directive or Living Will:  NO    1. NO - Do you have a history of heart attack, stroke, stent, bypass or surgery on an artery in the head, neck, heart or legs?  2. NO - Do you ever have any pain or discomfort in your chest?  3. NO - Do you have a history of  Heart Failure?  4. NO - Are you troubled by shortness of breath when: walking on the level, up a slight hill or at night?  5. NO - Do you currently have a cold, bronchitis or other respiratory infection?  6. NO - Do you have a cough, shortness of breath or wheezing?  7. NO - Do you sometimes get pains in the calves of your legs when you walk?  8. NO - Do you or anyone in your family have previous history of blood clots?  9. NO - Do you or does anyone in your family have a serious bleeding problem such as prolonged bleeding following surgeries or cuts?  10. NO - Have you ever had problems with anemia or been told to take iron pills?  11. NO - Have you had any abnormal blood loss such as black, tarry or bloody stools, or abnormal vaginal bleeding?  12. YES - Have you ever had a blood transfusion?  13. NO - Have you or any of your relatives ever had problems with anesthesia?  14. NO - Do you have sleep apnea, excessive snoring or daytime  drowsiness?  15. NO - Do you have any prosthetic heart valves?  16. NO - Do you have prosthetic joints?  17. NO - Is there any chance that you may be pregnant?      HPI:     HPI related to upcoming procedure: Scheduled to have right eye cataract surgery.  He had his left eye done over 10 years ago.  Denies chest pain, has no shortness of breath.  No recent sickness.    History of coronary artery disease, A. fib chronically on Coumadin denies any bleeding.  His last INR was at 2.6.  History of diabetes which been stable    See problem list for active medical problems.  Problems all longstanding and stable, except as noted/documented.  See ROS for pertinent symptoms related to these conditions.      MEDICAL HISTORY:     Patient Active Problem List    Diagnosis Date Noted     Albuminuria 09/10/2019     Priority: Medium     Type 2 diabetes mellitus with diabetic nephropathy, without long-term current use of insulin (H) 02/12/2018     Priority: Medium     Gastroesophageal reflux disease without esophagitis 03/14/2017     Priority: Medium     Benign essential hypertension 07/22/2016     Priority: Medium     Long-term (current) use of anticoagulants [Z79.01] 04/11/2016     Priority: Medium     Atrial fibrillation (HCC) [I48.91] 04/11/2016     Priority: Medium     Onychomycosis 12/31/2015     Priority: Medium     Osteoarthritis of left knee 10/14/2013     Priority: Medium     A-fib (H) 01/16/2013     Priority: Medium     Proteinuria 11/27/2012     Priority: Medium     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 Provider to review       DM (diabetes mellitus), type 2 with renal complications (H) 11/27/2012     Priority: Medium     Problem list name updated by automated process. Provider to review       Hernia, epigastric 08/29/2012     Priority: Medium     Rectal bleeding 10/18/2011      Priority: Medium     Admit[facility].       Hyperlipidemia with target LDL less than 100      Priority: Medium     Diagnosis updated by automated process. Provider to review and confirm.       CKD (chronic kidney disease) stage 3, GFR 30-59 ml/min (H) 06/10/2011     Priority: Medium     GFR Estimate   Date Value Range Status   7/23/2014 54* >60 mL/min/1.7m2 Final   1/14/2014 56* >60 mL/min/1.7m2 Final   1/16/2013 68  >60 mL/min/1.7m2 Final   11/23/2012 60* >60 mL/min/1.7m2 Final   7/12/2012 55* >60 mL/min/1.7m2 Final     GFR Estimate If Black   Date Value Range Status   7/23/2014 65  >60 mL/min/1.7m2 Final   1/14/2014 68  >60 mL/min/1.7m2 Final   1/16/2013 83  >60 mL/min/1.7m2 Final   11/23/2012 73  >60 mL/min/1.7m2 Final   7/12/2012 67  >60 mL/min/1.7m2 Final            Advanced directives, counseling/discussion 06/09/2011     Priority: Medium     Discussed Advance Directive planning with patient; information given to patient to review.         CAD (coronary artery disease)      Priority: Medium     4 vessel coronary artery bypass surgery , about 2002.has done well ever since        Past Medical History:   Diagnosis Date     CAD (coronary artery disease)      History of tobacco use     Smoked x 50 years, Quit 1996     Hyperlipidemia LDL goal < 100      Hypertension goal BP (blood pressure) < 130/80      Rectal bleeding 10/18/2011    [facility]mit[facility].     Type 2 diabetes, HbA1c goal < 7% (H)      Past Surgical History:   Procedure Laterality Date     BYPASS GRAFT ARTERY CORONARY  2004    3 vessel 2004     OPEN REDUCTION INTERNAL FIXATION PATELLA       Current Outpatient Medications   Medication Sig Dispense Refill     acetaminophen (TYLENOL) 500 MG tablet Take 1-2 tablets (500-1,000 mg) by mouth every 8 hours as needed for mild pain ((no more than 3000 mg in 24 hours)) 60 tablet 1     amLODIPine (NORVASC) 5 MG tablet TAKE 1 TABLET(5 MG) BY MOUTH DAILY 90 tablet 2     aspirin 81 MG tablet Take 1 tablet by  "mouth daily.  3     atorvastatin (LIPITOR) 40 MG tablet TAKE 1 TABLET(40 MG) BY MOUTH DAILY 90 tablet 2     dorzolamide-timolol (COSOPT) 2-0.5 % ophthalmic solution Place 1 drop Into the left eye 2 times daily  6     latanoprost (XALATAN) 0.005 % ophthalmic solution Place 1 drop Into the left eye daily  3     lidocaine (XYLOCAINE) 2 % external gel Apply topically 3 times daily 60 mL 1     lisinopril (PRINIVIL/ZESTRIL) 20 MG tablet TAKE 1 TABLET(20 MG) BY MOUTH DAILY 90 tablet 2     metFORMIN (GLUCOPHAGE) 500 MG tablet TAKE 1 TABLET BY MOUTH TWICE DAILY WITH MEALS 180 tablet 0     metoprolol tartrate (LOPRESSOR) 25 MG tablet Take one a day 90 tablet 4     omeprazole (PRILOSEC) 20 MG DR capsule TAKE 1 CAPSULE BY MOUTH EVERY DAY 30 TO 60 MINUTES BEFORE A MEAL 90 capsule 2     order for DME Equipment being ordered: Mild compression hose 1 each 0     warfarin (COUMADIN) 2.5 MG tablet Take 1 tablet (2.5 mg) by mouth daily 90 tablet 0     warfarin ANTICOAGULANT (COUMADIN) 2.5 MG tablet Take 1 tablet (2.5 mg) by mouth daily OR AS DIRECTED BY INR NURSE 90 tablet 0     OTC products: None, except as noted above    Allergies   Allergen Reactions     Crestor [Rosuvastatin] Cramps     Penicillins      Swelling       Latex Allergy: NO    Social History     Tobacco Use     Smoking status: Former Smoker     Years: 50.00     Types: Cigarettes     Last attempt to quit: 3/4/1992     Years since quittin.6     Smokeless tobacco: Former User   Substance Use Topics     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    EXAM:   BP (!) 156/83 (BP Location: Right arm, Patient Position: Chair, Cuff Size: Adult Regular)   Pulse 57   Temp 97.5  F (36.4  C) (Oral)   Ht 1.626 m (5' 4\")   Wt 74.8 kg (165 lb) "   SpO2 98%   BMI 28.32 kg/m      GENERAL APPEARANCE: healthy, alert and no distress     EYES: EOMI,  PERRL     HENT: ear canals and TM's normal and nose and mouth without ulcers or lesions     NECK: no adenopathy, no asymmetry, masses, or scars and thyroid normal to palpation     RESP: lungs clear to auscultation - no rales, rhonchi or wheezes     CV: irregular rhythm     ABDOMEN:  soft, nontender, no HSM or masses and bowel sounds normal     MS: extremities normal- no gross deformities noted, no evidence of inflammation in joints, FROM in all extremities.     SKIN: no suspicious lesions or rashes     NEURO: Normal strength and tone, sensory exam grossly normal, mentation intact and speech normal     PSYCH: mentation appears normal. and affect normal/bright     LYMPHATICS: No cervical adenopathy    DIAGNOSTICS:   No labs or EKG required for low risk surgery (cataract, skin procedure, breast biopsy, etc)    Recent Labs   Lab Test 10/09/19 09/09/19  1336 09/04/19 02/06/19  1452  11/20/18  1042  05/08/18  1528   HGB  --   --   --   --  12.8*  --  12.3*  --  13.4   PLT  --   --   --   --   --   --  293  --  260   INR 2.6*  --  2.2*   < >  --    < >  --    < >  --    NA  --  137  --   --  137  --  135  --  140   POTASSIUM  --  4.2  --   --  4.2   < > 4.2  --  4.4   CR  --  1.05  --   --  0.92   < > 1.24  --  1.19   A1C  --  5.7*  --   --  6.0*   < > 6.3*  --  6.3*    < > = values in this interval not displayed.        IMPRESSION:   Reason for surgery/procedure: Right eye Cataract surgery  Diagnosis/reason for consult: Right eye Cataract.    The proposed surgical procedure is considered LOW risk.    REVISED CARDIAC RISK INDEX  The patient has the following serious cardiovascular risks for perioperative complications such as (MI, PE, VFib and 3  AV Block):  Coronary Artery Disease (MI, positive stress test, angina, Qs on EKG)  Diabetes Mellitus (on Insulin)  INTERPRETATION: 0 risks: Class I (very low risk - 0.4%  complication rate)    The patient has the following additional risks for perioperative complications:  No identified additional risks      ICD-10-CM    1. Preop general physical exam Z01.818       Diagnosis Comments   1. Preop general physical exam     2. Senile cataract of right eye, unspecified age-related cataract type     3. Chronic atrial fibrillation     4. Benign essential hypertension     5. Coronary artery disease involving coronary bypass graft of native heart without angina pectoris           RECOMMENDATIONS:     Patient Instructions     Before Your Surgery      Call your surgeon if there is any change in your health. This includes signs of a cold or flu (such as a sore throat, runny nose, cough, rash or fever).    Do not smoke, drink alcohol or take over the counter medicine (unless your surgeon or primary care doctor tells you to) for the 24 hours before and after surgery.    If you take prescribed drugs: Follow your doctor s orders about which medicines to take and which to stop until after surgery.    Eating and drinking prior to surgery: follow the instructions from your surgeon    Take a shower or bath the night before surgery. Use the soap your surgeon gave you to gently clean your skin. If you do not have soap from your surgeon, use your regular soap. Do not shave or scrub the surgery site.  Wear clean pajamas and have clean sheets on your bed.     Hold Metformin morning of surgery.        --Patient is to take all scheduled medications on the day of surgery EXCEPT for modifications listed below.    APPROVAL GIVEN to proceed with proposed procedure, without further diagnostic evaluation       Signed Electronically by: Alexandro Zarate MD    Copy of this evaluation report is provided to requesting physician.    [facility] Preop Guidelines    Revised Cardiac Risk Index

## 2019-11-07 DIAGNOSIS — E11.21 TYPE 2 DIABETES MELLITUS WITH DIABETIC NEPHROPATHY, WITHOUT LONG-TERM CURRENT USE OF INSULIN (H): ICD-10-CM

## 2019-11-07 DIAGNOSIS — N18.30 CKD (CHRONIC KIDNEY DISEASE) STAGE 3, GFR 30-59 ML/MIN (H): ICD-10-CM

## 2019-11-07 RX ORDER — AMLODIPINE BESYLATE 5 MG/1
TABLET ORAL
Qty: 90 TABLET | Refills: 0 | Status: SHIPPED | OUTPATIENT
Start: 2019-11-07 | End: 2020-02-04

## 2019-11-08 NOTE — TELEPHONE ENCOUNTER
Prescriptions approved per [facility] Refill Protocol.    Saloni Gillespie RN

## 2019-11-20 ENCOUNTER — ANTICOAGULATION THERAPY VISIT (OUTPATIENT)
Dept: NURSING | Facility: CLINIC | Age: 84
End: 2019-11-20
Payer: COMMERCIAL

## 2019-11-20 DIAGNOSIS — I48.91 ATRIAL FIBRILLATION (H): ICD-10-CM

## 2019-11-20 DIAGNOSIS — Z79.01 LONG TERM CURRENT USE OF ANTICOAGULANT THERAPY: ICD-10-CM

## 2019-11-20 LAB — INR POINT OF CARE: 1.9 (ref 0.86–1.14)

## 2019-11-20 PROCEDURE — 36416 COLLJ CAPILLARY BLOOD SPEC: CPT

## 2019-11-20 PROCEDURE — 85610 PROTHROMBIN TIME: CPT | Mod: QW

## 2019-11-20 PROCEDURE — 99207 ZZC NO CHARGE NURSE ONLY: CPT

## 2019-11-20 NOTE — PROGRESS NOTES
ANTICOAGULATION FOLLOW-UP CLINIC VISIT    Patient Name:  Linwood Chi  Date:  2019  Contact Type:  Face to Face    SUBJECTIVE:  Patient Findings     Comments:     Assessed for S/S bleeding, clotting, medication, diet, health, activity and alcohol changes          Clinical Outcomes     Negatives:   Major bleeding event, Thromboembolic event, Anticoagulation-related hospital admission, Anticoagulation-related ED visit, Anticoagulation-related fatality    Comments:     Assessed for S/S bleeding, clotting, medication, diet, health, activity and alcohol changes             OBJECTIVE    INR Protime   Date Value Ref Range Status   2019 1.9 (A) 0.86 - 1.14 Final       ASSESSMENT / PLAN  INR assessment THER    Recheck INR In: 5 WEEKS    INR Location Clinic      Anticoagulation Summary  As of 2019    INR goal:   2.0-3.0   TTR:   91.2 % (3.6 y)   INR used for dosin.9! (2019)   Warfarin maintenance plan:   2.5 mg (2.5 mg x 1) every day   Full warfarin instructions:   2.5 mg every day   Weekly warfarin total:   17.5 mg   No change documented:   Cheri Dang RN   Plan last modified:   Layla Garcia RN (10/9/2019)   Next INR check:   2019   Priority:   INR   Target end date:   Indefinite    Indications    Long-term (current) use of anticoagulants [Z79.01] [Z79.01]  Atrial fibrillation (HCC) [I48.91] [I48.91]             Anticoagulation Episode Summary     INR check location:       Preferred lab:       Send INR reminders to:   SANDOR LILLY    Comments:         Anticoagulation Care Providers     Provider Role Specialty Phone number    Jesika Sony Vásquez MD [facility] [phone]            See the Encounter Report to view Anticoagulation Flowsheet and Dosing Calendar (Go to Encounters tab in chart review, and find the Anticoagulation Therapy Visit)        Cheri Dang RN

## 2019-12-16 DIAGNOSIS — N18.30 CKD (CHRONIC KIDNEY DISEASE) STAGE 3, GFR 30-59 ML/MIN (H): ICD-10-CM

## 2019-12-16 NOTE — LETTER
[address] [address]  [phone]          December 18, 2019    Linwood Chi                                                                                                                     [address] [place] [address]            Dear Linwood,       Your provider has sent a 30 day eugene refill of lisinopril (PRINIVIL/ZESTRIL) 20 MG tablet . You are due for an appointment for further refills. Appointment options could include: an in person office visit, telephone visit or Evisit through [facility]. Please contact the clinic to schedule an appointment for further refills.     Sincerely,                Sony Canchola MD/jessicat

## 2019-12-18 ENCOUNTER — ANTICOAGULATION THERAPY VISIT (OUTPATIENT)
Dept: NURSING | Facility: CLINIC | Age: 84
End: 2019-12-18
Payer: COMMERCIAL

## 2019-12-18 DIAGNOSIS — Z79.01 LONG TERM CURRENT USE OF ANTICOAGULANT THERAPY: ICD-10-CM

## 2019-12-18 DIAGNOSIS — I48.91 ATRIAL FIBRILLATION (H): ICD-10-CM

## 2019-12-18 DIAGNOSIS — N18.30 CKD (CHRONIC KIDNEY DISEASE) STAGE 3, GFR 30-59 ML/MIN (H): ICD-10-CM

## 2019-12-18 LAB — INR POINT OF CARE: 2.3 (ref 0.86–1.14)

## 2019-12-18 PROCEDURE — 36416 COLLJ CAPILLARY BLOOD SPEC: CPT

## 2019-12-18 PROCEDURE — 85610 PROTHROMBIN TIME: CPT | Mod: QW

## 2019-12-18 PROCEDURE — 99207 ZZC NO CHARGE NURSE ONLY: CPT

## 2019-12-18 RX ORDER — LISINOPRIL 20 MG/1
TABLET ORAL
Qty: 30 TABLET | Refills: 0 | Status: SHIPPED | OUTPATIENT
Start: 2019-12-18 | End: 2020-01-27

## 2019-12-18 RX ORDER — LISINOPRIL 20 MG/1
TABLET ORAL
Qty: 90 TABLET | Refills: 0 | OUTPATIENT
Start: 2019-12-18

## 2019-12-18 NOTE — TELEPHONE ENCOUNTER
Called and verified with pharmacy on duplicate prescription. Please disregard. ELDER Bello

## 2020-01-23 DIAGNOSIS — N18.30 CKD (CHRONIC KIDNEY DISEASE) STAGE 3, GFR 30-59 ML/MIN (H): ICD-10-CM

## 2020-01-23 DIAGNOSIS — E11.21 TYPE 2 DIABETES MELLITUS WITH DIABETIC NEPHROPATHY, WITHOUT LONG-TERM CURRENT USE OF INSULIN (H): ICD-10-CM

## 2020-01-27 RX ORDER — LISINOPRIL 20 MG/1
TABLET ORAL
Qty: 90 TABLET | Refills: 1 | Status: SHIPPED | OUTPATIENT
Start: 2020-01-27 | End: 2020-02-04

## 2020-01-27 NOTE — TELEPHONE ENCOUNTER
Prescription approved per [facility] Refill Protocol.    Brianne Gibbs RN  [facility]

## 2020-01-29 ENCOUNTER — TELEPHONE (OUTPATIENT)
Dept: FAMILY MEDICINE | Facility: CLINIC | Age: 85
End: 2020-01-29

## 2020-01-29 ENCOUNTER — ANTICOAGULATION THERAPY VISIT (OUTPATIENT)
Dept: NURSING | Facility: CLINIC | Age: 85
End: 2020-01-29
Payer: COMMERCIAL

## 2020-01-29 DIAGNOSIS — Z79.01 LONG TERM CURRENT USE OF ANTICOAGULANT THERAPY: ICD-10-CM

## 2020-01-29 DIAGNOSIS — I48.20 CHRONIC ATRIAL FIBRILLATION (H): Primary | ICD-10-CM

## 2020-01-29 DIAGNOSIS — I48.91 ATRIAL FIBRILLATION (H): ICD-10-CM

## 2020-01-29 LAB — INR POINT OF CARE: 2.2 (ref 0.86–1.14)

## 2020-01-29 PROCEDURE — 85610 PROTHROMBIN TIME: CPT | Mod: QW

## 2020-01-29 PROCEDURE — 99207 ZZC NO CHARGE NURSE ONLY: CPT

## 2020-01-29 PROCEDURE — 36416 COLLJ CAPILLARY BLOOD SPEC: CPT

## 2020-01-29 NOTE — PROGRESS NOTES
ANTICOAGULATION FOLLOW-UP CLINIC VISIT    Patient Name:  Linwood Chi  Date:  2020  Contact Type:  Face to Face    SUBJECTIVE:  Patient Findings     Comments:   The patient was assessed for diet, medication, and activity level changes, missed or extra doses, bruising or bleeding, with no problem findings.          Clinical Outcomes     Negatives:   Major bleeding event, Thromboembolic event, Anticoagulation-related hospital admission, Anticoagulation-related ED visit, Anticoagulation-related fatality    Comments:   The patient was assessed for diet, medication, and activity level changes, missed or extra doses, bruising or bleeding, with no problem findings.             OBJECTIVE    INR Protime   Date Value Ref Range Status   2020 2.2 (A) 0.86 - 1.14 Final       ASSESSMENT / PLAN  No question data found.  Anticoagulation Summary  As of 2020    INR goal:   2.0-3.0   TTR:   82.7 % (1 y)   INR used for dosin.2 (2020)   Warfarin maintenance plan:   2.5 mg (2.5 mg x 1) every day   Full warfarin instructions:   2.5 mg every day   Weekly warfarin total:   17.5 mg   No change documented:   Layla Garcia RN   Plan last modified:   Layla Garcia RN (10/9/2019)   Next INR check:   3/11/2020   Priority:   INR   Target end date:   Indefinite    Indications    Long-term (current) use of anticoagulants [Z79.01] [Z79.01]  Atrial fibrillation (HCC) [I48.91] [I48.91]             Anticoagulation Episode Summary     INR check location:       Preferred lab:       Send INR reminders to:   SANDOR LILLY    Comments:         Anticoagulation Care Providers     Provider Role Specialty Phone number    Sony Canchola MD [facility] [phone]            See the Encounter Report to view Anticoagulation Flowsheet and Dosing Calendar (Go to Encounters tab in chart review, and find the Anticoagulation Therapy Visit)        Layla Garcia RN

## 2020-01-29 NOTE — PATIENT INSTRUCTIONS
Anticoagulation Clinic:  Please call [phone] with any problems or questions regarding your Warfarin therapy.

## 2020-02-03 PROBLEM — I48.20 CHRONIC ATRIAL FIBRILLATION (H): Status: ACTIVE | Noted: 2020-02-03

## 2020-02-03 RX ORDER — INFLUENZA VACCINE, ADJUVANTED 15; 15; 15 UG/.5ML; UG/.5ML; UG/.5ML
INJECTION, SUSPENSION INTRAMUSCULAR
COMMUNITY
Start: 2019-09-19 | End: 2021-04-14

## 2020-02-03 NOTE — PROGRESS NOTES
Subjective     Linwood Chi is a 89 year old male who presents to clinic today for the following health issues:    HPI     Had cataract surgery; went well  HCM: Wellness, ADP, Shingrix      Diabetes Follow-up      How often are you checking your blood sugar? Not at all    What concerns do you have today about your diabetes? None     Do you have any of these symptoms? (Select all that apply)  No numbness or tingling in feet.  No redness, sores or blisters on feet.  No complaints of excessive thirst.  No reports of blurry vision.  No significant changes to weight.    Have you had a diabetic eye exam in the last 12 months? YES    Hyperlipidemia Follow-Up      Are you regularly taking any medication or supplement to lower your cholesterol?   No    Are you having muscle aches or other side effects that you think could be caused by your cholesterol lowering medication?  No    Hypertension Follow-up     BP Readings from Last 6 Encounters:   02/04/20 (!) 144/80   11/01/19 136/82   09/09/19 124/76   02/18/19 120/66   02/06/19 136/82   01/08/19 110/60       Do you check your blood pressure regularly outside of the clinic? No     Are you following a low salt diet? No    Are your blood pressures ever more than 140 on the top number (systolic) OR more   than 90 on the bottom number (diastolic), for example 140/90? No    BP Readings from Last 2 Encounters:   02/04/20 (!) 144/80   11/01/19 136/82     Hemoglobin A1C (%)   Date Value   02/04/2020 6.0 (H)   09/09/2019 5.7 (H)     LDL Cholesterol Calculated (mg/dL)   Date Value   02/06/2019 51   02/12/2018 59     Atrial Fibrillation   On Warfarin and Metoprolol      How many servings of fruits and vegetables do you eat daily?  0-1    On average, how many sweetened beverages do you drink each day (Examples: soda, juice, sweet tea, etc.  Do NOT count diet or artificially sweetened beverages)?   1    How many days per week do you exercise enough to make your heart beat faster? 3 or  less    How many minutes a day do you exercise enough to make your heart beat faster? 20 minutes    How many days per week do you miss taking your medication? 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     DM (diabetes mellitus), type 2 with renal complications (H)     A-fib (H)     Osteoarthritis of left knee     Onychomycosis     Long-term (current) use of anticoagulants [Z79.01]     Atrial fibrillation (HCC) [I48.91]     Benign essential hypertension     Gastroesophageal reflux disease without esophagitis     Type 2 diabetes mellitus with diabetic nephropathy, without long-term current use of insulin (H)     Albuminuria     Chronic atrial fibrillation     Abdominal aortic aneurysm (AAA) without rupture (H)     Past Surgical History:   Procedure Laterality Date     BYPASS GRAFT ARTERY CORONARY      3 vessel      left eye cataract Left      OPEN REDUCTION INTERNAL FIXATION PATELLA         Social History     Tobacco Use     Smoking status: Former Smoker     Years: 50.00     Types: Cigarettes     Last attempt to quit: 3/4/1992     Years since quittin.9     Smokeless tobacco: Former User   Substance Use Topics     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       Objective    BP (!) 144/80   Pulse 61   Temp 96.4  F (35.8  C) (Oral)   Wt 74.8 kg (165 lb)   SpO2 100%   BMI 28.32 kg/m    Body mass index is 28.32 kg/m .  Physical Exam   GENERAL: healthy, alert and no distress  NECK: no adenopathy and thyroid normal to palpation  RESP: lungs clear to auscultation - no rales, rhonchi or wheezes  CV: regular rate and rhythm, normal S1 S2, no S3 or S4, no murmur, click or rub, no peripheral edema and peripheral pulses strong  ABDOMEN: soft, nontender, no hepatosplenomegaly, no masses and bowel sounds normal  MS: no gross musculoskeletal defects noted, no edema  Diabetic foot exam: normal DP and PT pulses, no trophic changes or ulcerative lesions and normal sensory exam            Assessment & Plan     Linwood was seen today for diabetes.    Diagnoses and all orders for this visit;    Type 2 diabetes mellitus with stage 3 chronic kidney disease, unspecified whether long term insulin use (H)        -     Hemoglobin A1c  -     Lipid panel reflex to direct LDL Fasting  -     RetinaVue Eye Scan    Abdominal aortic aneurysm (AAA) without rupture (H)        -     Had repair, 2/15/2019, Endovascular aortic aneurysm repair with [place] Excluder    CKD (chronic kidney disease) stage 3, GFR 30-59 ml/min (H)  -     lisinopril (PRINIVIL/ZESTRIL) 20 MG tablet; TAKE 1 TABLET BY MOUTH DAILY  -     amLODIPine (NORVASC) 5 MG tablet; For HTN    Atrial fibrillation, unspecified type (H)  -     warfarin ANTICOAGULANT (COUMADIN) 2.5 MG tablet; Take 1 tablet (2.5 mg) by mouth daily OR AS DIRECTED BY INR NURSE    Gastroesophageal reflux disease without esophagitis  -     omeprazole (PRILOSEC) 20 MG DR capsule; TAKE 1 CAPSULE BY MOUTH EVERY DAY 30 TO 60 MINUTES BEFORE A MEAL    Benign essential hypertension  -     metoprolol tartrate (LOPRESSOR) 25 MG tablet; Take one a day  -     atorvastatin (LIPITOR) 40 MG tablet; TAKE 1 TABLET(40 MG) BY MOUTH DAILY    Type 2 diabetes mellitus with diabetic  "nephropathy, without long-term current use of insulin (H)  -     metFORMIN (GLUCOPHAGE) 500 MG tablet; For diabetes    Hyperlipidemia with target LDL less than 100  -     atorvastatin (LIPITOR) 40 MG tablet; TAKE 1 TABLET(40 MG) BY MOUTH DAILY     BMI:   Estimated body mass index is 28.32 kg/m  as calculated from the following:    Height as of 11/1/19: 1.626 m (5' 4\").    Weight as of this encounter: 74.8 kg (165 lb).   Weight management plan: Discussed healthy diet and exercise guidelines    Return in about 3 months (around 5/4/2020) for Wellness follow up.    Sony Canchola MD  [facility]        "

## 2020-02-04 ENCOUNTER — OFFICE VISIT (OUTPATIENT)
Dept: FAMILY MEDICINE | Facility: CLINIC | Age: 85
End: 2020-02-04
Payer: COMMERCIAL

## 2020-02-04 VITALS
TEMPERATURE: 96.4 F | BODY MASS INDEX: 28.32 KG/M2 | DIASTOLIC BLOOD PRESSURE: 80 MMHG | SYSTOLIC BLOOD PRESSURE: 144 MMHG | OXYGEN SATURATION: 100 % | WEIGHT: 165 LBS | HEART RATE: 61 BPM

## 2020-02-04 DIAGNOSIS — E11.21 TYPE 2 DIABETES MELLITUS WITH DIABETIC NEPHROPATHY, WITHOUT LONG-TERM CURRENT USE OF INSULIN (H): Primary | ICD-10-CM

## 2020-02-04 DIAGNOSIS — I71.40 ABDOMINAL AORTIC ANEURYSM (AAA) WITHOUT RUPTURE (H): ICD-10-CM

## 2020-02-04 DIAGNOSIS — N18.30 CKD (CHRONIC KIDNEY DISEASE) STAGE 3, GFR 30-59 ML/MIN (H): ICD-10-CM

## 2020-02-04 DIAGNOSIS — E78.5 HYPERLIPIDEMIA WITH TARGET LDL LESS THAN 100: ICD-10-CM

## 2020-02-04 DIAGNOSIS — I48.91 ATRIAL FIBRILLATION, UNSPECIFIED TYPE (H): ICD-10-CM

## 2020-02-04 DIAGNOSIS — K21.9 GASTROESOPHAGEAL REFLUX DISEASE WITHOUT ESOPHAGITIS: ICD-10-CM

## 2020-02-04 DIAGNOSIS — I10 BENIGN ESSENTIAL HYPERTENSION: ICD-10-CM

## 2020-02-04 LAB
CHOLEST SERPL-MCNC: 148 MG/DL
HBA1C MFR BLD: 6 % (ref 0–5.6)
HDLC SERPL-MCNC: 49 MG/DL
LDLC SERPL CALC-MCNC: 62 MG/DL
NONHDLC SERPL-MCNC: 99 MG/DL
TRIGL SERPL-MCNC: 184 MG/DL

## 2020-02-04 PROCEDURE — 83036 HEMOGLOBIN GLYCOSYLATED A1C: CPT | Performed by: FAMILY MEDICINE

## 2020-02-04 PROCEDURE — 80061 LIPID PANEL: CPT | Performed by: FAMILY MEDICINE

## 2020-02-04 PROCEDURE — 99214 OFFICE O/P EST MOD 30 MIN: CPT | Performed by: FAMILY MEDICINE

## 2020-02-04 PROCEDURE — 36415 COLL VENOUS BLD VENIPUNCTURE: CPT | Performed by: FAMILY MEDICINE

## 2020-02-04 RX ORDER — LISINOPRIL 20 MG/1
TABLET ORAL
Qty: 90 TABLET | Refills: 1 | Status: SHIPPED | OUTPATIENT
Start: 2020-02-04 | End: 2021-01-15

## 2020-02-04 RX ORDER — WARFARIN SODIUM 2.5 MG/1
2.5 TABLET ORAL DAILY
Qty: 90 TABLET | Refills: 2 | Status: SHIPPED | OUTPATIENT
Start: 2020-02-04 | End: 2020-12-30

## 2020-02-04 RX ORDER — ATORVASTATIN CALCIUM 40 MG/1
TABLET, FILM COATED ORAL
Qty: 90 TABLET | Refills: 1 | Status: SHIPPED | OUTPATIENT
Start: 2020-02-04 | End: 2020-07-25

## 2020-02-04 RX ORDER — METOPROLOL TARTRATE 25 MG/1
TABLET, FILM COATED ORAL
Qty: 90 TABLET | Refills: 1 | Status: SHIPPED | OUTPATIENT
Start: 2020-02-04

## 2020-02-04 RX ORDER — AMLODIPINE BESYLATE 5 MG/1
TABLET ORAL
Qty: 90 TABLET | Refills: 3 | Status: SHIPPED | OUTPATIENT
Start: 2020-02-04 | End: 2021-01-15

## 2020-02-04 NOTE — LETTER
[address] [place] [address]    February 6, 2020    Linwood Chi  [address] [facility] [address]          Dear Linwood,    Normal Results    Enclosed is a copy of your results.     Results for orders placed or performed in visit on 02/04/20   Lipid panel reflex to direct LDL Fasting     Status: Abnormal   Result Value Ref Range    Cholesterol 148 <200 mg/dL    Triglycerides 184 (H) <150 mg/dL    HDL Cholesterol 49 >39 mg/dL    LDL Cholesterol Calculated 62 <100 mg/dL    Non HDL Cholesterol 99 <130 mg/dL   Hemoglobin A1c     Status: Abnormal   Result Value Ref Range    Hemoglobin A1C 6.0 (H) 0 - 5.6 %       If you have any questions or concerns, please call myself or my nurse at [phone].      Sincerely,        Sony Canchola MD/sienna

## 2020-03-11 ENCOUNTER — ANTICOAGULATION THERAPY VISIT (OUTPATIENT)
Dept: NURSING | Facility: CLINIC | Age: 85
End: 2020-03-11
Payer: COMMERCIAL

## 2020-03-11 DIAGNOSIS — I48.91 ATRIAL FIBRILLATION (H): ICD-10-CM

## 2020-03-11 DIAGNOSIS — I48.20 CHRONIC ATRIAL FIBRILLATION (H): ICD-10-CM

## 2020-03-11 DIAGNOSIS — Z79.01 LONG TERM CURRENT USE OF ANTICOAGULANT THERAPY: ICD-10-CM

## 2020-03-11 LAB — INR POINT OF CARE: 2.1 (ref 0.86–1.14)

## 2020-03-11 PROCEDURE — 99207 ZZC NO CHARGE NURSE ONLY: CPT

## 2020-03-11 PROCEDURE — 36416 COLLJ CAPILLARY BLOOD SPEC: CPT

## 2020-03-11 PROCEDURE — 85610 PROTHROMBIN TIME: CPT | Mod: QW

## 2020-03-11 NOTE — PATIENT INSTRUCTIONS
Anticoagulation Clinic:  Please call [phone] with any problems or questions regarding your Warfarin therapy.

## 2020-03-11 NOTE — PROGRESS NOTES
ANTICOAGULATION FOLLOW-UP CLINIC VISIT    Patient Name:  Linwood Chi  Date:  3/11/2020  Contact Type:  Face to Face    SUBJECTIVE:  Patient Findings     Comments:   No longer taking ASA 81 mg. The patient was assessed for diet, medication, and activity level changes, missed or extra doses, bruising or bleeding, with no problem findings.          Clinical Outcomes     Negatives:   Major bleeding event, Thromboembolic event, Anticoagulation-related hospital admission, Anticoagulation-related ED visit, Anticoagulation-related fatality    Comments:   No longer taking ASA 81 mg. The patient was assessed for diet, medication, and activity level changes, missed or extra doses, bruising or bleeding, with no problem findings.             OBJECTIVE    INR Protime   Date Value Ref Range Status   2020 2.1 (A) 0.86 - 1.14 Final       ASSESSMENT / PLAN  No question data found.  Anticoagulation Summary  As of 3/11/2020    INR goal:   2.0-3.0   TTR:   89.5 % (1 y)   INR used for dosin.1 (3/11/2020)   Warfarin maintenance plan:   2.5 mg (2.5 mg x 1) every day   Full warfarin instructions:   2.5 mg every day   Weekly warfarin total:   17.5 mg   No change documented:   Layla Garcia RN   Plan last modified:   Layla Garcia RN (10/9/2019)   Next INR check:   2020   Priority:   INR   Target end date:   Indefinite    Indications    Long-term (current) use of anticoagulants [Z79.01] [Z79.01]  Atrial fibrillation (HCC) [I48.91] [I48.91]  Chronic atrial fibrillation [I48.20]             Anticoagulation Episode Summary     INR check location:       Preferred lab:       Send INR reminders to:   SANDOR LILLY    Comments:         Anticoagulation Care Providers     Provider Role Specialty Phone number    Sony Canchola MD Referring [facility] [phone]            See the Encounter Report to view Anticoagulation Flowsheet and Dosing Calendar (Go to Encounters tab in chart review, and find the  Anticoagulation Therapy Visit)        Layla Garcia RN

## 2020-04-22 ENCOUNTER — ANTICOAGULATION THERAPY VISIT (OUTPATIENT)
Dept: NURSING | Facility: CLINIC | Age: 85
End: 2020-04-22
Payer: COMMERCIAL

## 2020-04-22 DIAGNOSIS — I48.20 CHRONIC ATRIAL FIBRILLATION (H): ICD-10-CM

## 2020-04-22 DIAGNOSIS — I48.91 ATRIAL FIBRILLATION (H): ICD-10-CM

## 2020-04-22 DIAGNOSIS — Z79.01 LONG TERM CURRENT USE OF ANTICOAGULANT THERAPY: ICD-10-CM

## 2020-04-22 LAB
CAPILLARY BLOOD COLLECTION: NORMAL
INR PPP: 1.6 (ref 0.86–1.14)

## 2020-04-22 PROCEDURE — 36415 COLL VENOUS BLD VENIPUNCTURE: CPT | Performed by: FAMILY MEDICINE

## 2020-04-22 PROCEDURE — 85610 PROTHROMBIN TIME: CPT | Performed by: FAMILY MEDICINE

## 2020-04-22 PROCEDURE — 99207 ZZC NO CHARGE NURSE ONLY: CPT

## 2020-04-22 NOTE — PROGRESS NOTES
ANTICOAGULATION FOLLOW-UP CLINIC VISIT    Patient Name:  Linwood Chi  Date:  2020  Contact Type:  Telephone    SUBJECTIVE:  Patient Findings     Comments:   Patient denies any identifiable changes that caused the subtherapeutic INR. The patient was assessed for diet, medication, and activity level changes, missed or extra doses, bruising or bleeding, with no problem findings.  .        Clinical Outcomes     Negatives:   Major bleeding event, Thromboembolic event, Anticoagulation-related hospital admission, Anticoagulation-related ED visit, Anticoagulation-related fatality    Comments:   Patient denies any identifiable changes that caused the subtherapeutic INR. The patient was assessed for diet, medication, and activity level changes, missed or extra doses, bruising or bleeding, with no problem findings.  .           OBJECTIVE    INR   Date Value Ref Range Status   2020 1.60 (H) 0.86 - 1.14 Final     Comment:     This test is intended for monitoring Coumadin therapy.  Results are not   accurate in patients with prolonged INR due to factor deficiency.         ASSESSMENT / PLAN  No question data found.  Anticoagulation Summary  As of 2020    INR goal:   2.0-3.0   TTR:   80.2 % (1 y)   INR used for dosin.60! (2020)   Warfarin maintenance plan:   2.5 mg (2.5 mg x 1) every day   Full warfarin instructions:   : 5 mg; Otherwise 2.5 mg every day   Weekly warfarin total:   17.5 mg   Plan last modified:   Layla Garcia RN (10/9/2019)   Next INR check:   2020   Priority:   INR   Target end date:   Indefinite    Indications    Long-term (current) use of anticoagulants [Z79.01] [Z79.01]  Atrial fibrillation (HCC) [I48.91] [I48.91]  Chronic atrial fibrillation [I48.20]             Anticoagulation Episode Summary     INR check location:       Preferred lab:       Send INR reminders to:   SANDOR LILLY    Comments:         Anticoagulation Care Providers     Provider Role Specialty  Phone number    Jesika oSny Vásquez MD Referring Family Practice [phone]            See the Encounter Report to view Anticoagulation Flowsheet and Dosing Calendar (Go to Encounters tab in chart review, and find the Anticoagulation Therapy Visit)        Layla Garcia RN

## 2020-05-06 ENCOUNTER — ANTICOAGULATION THERAPY VISIT (OUTPATIENT)
Dept: NURSING | Facility: CLINIC | Age: 85
End: 2020-05-06
Payer: COMMERCIAL

## 2020-05-06 DIAGNOSIS — I48.91 ATRIAL FIBRILLATION (H): ICD-10-CM

## 2020-05-06 DIAGNOSIS — Z79.01 LONG TERM CURRENT USE OF ANTICOAGULANT THERAPY: ICD-10-CM

## 2020-05-06 DIAGNOSIS — I48.20 CHRONIC ATRIAL FIBRILLATION (H): ICD-10-CM

## 2020-05-06 LAB
CAPILLARY BLOOD COLLECTION: NORMAL
INR PPP: 2.3 (ref 0.86–1.14)

## 2020-05-06 PROCEDURE — 99207 ZZC NO CHARGE NURSE ONLY: CPT

## 2020-05-06 PROCEDURE — 85610 PROTHROMBIN TIME: CPT | Performed by: FAMILY MEDICINE

## 2020-05-06 PROCEDURE — 36415 COLL VENOUS BLD VENIPUNCTURE: CPT | Performed by: FAMILY MEDICINE

## 2020-05-06 NOTE — PROGRESS NOTES
ANTICOAGULATION FOLLOW-UP CLINIC VISIT    Patient Name:  Linwood Chi  Date:  2020  Contact Type:  Telephone    SUBJECTIVE:  Patient Findings     Comments:   The patient was assessed for diet, medication, and activity level changes, missed or extra doses, bruising or bleeding, with no problem findings.          Clinical Outcomes     Negatives:   Major bleeding event, Thromboembolic event, Anticoagulation-related hospital admission, Anticoagulation-related ED visit, Anticoagulation-related fatality    Comments:   The patient was assessed for diet, medication, and activity level changes, missed or extra doses, bruising or bleeding, with no problem findings.             OBJECTIVE    INR   Date Value Ref Range Status   2020 2.30 (H) 0.86 - 1.14 Final     Comment:     This test is intended for monitoring Coumadin therapy.  Results are not   accurate in patients with prolonged INR due to factor deficiency.         ASSESSMENT / PLAN  No question data found.  Anticoagulation Summary  As of 2020    INR goal:   2.0-3.0   TTR:   78.0 % (1 y)   INR used for dosin.30 (2020)   Warfarin maintenance plan:   2.5 mg (2.5 mg x 1) every day   Full warfarin instructions:   2.5 mg every day   Weekly warfarin total:   17.5 mg   No change documented:   Layla Garcia RN   Plan last modified:   Layla Garcia RN (10/9/2019)   Next INR check:   6/3/2020   Priority:   INR   Target end date:   Indefinite    Indications    Long-term (current) use of anticoagulants [Z79.01] [Z79.01]  Atrial fibrillation (HCC) [I48.91] [I48.91]  Chronic atrial fibrillation [I48.20]             Anticoagulation Episode Summary     INR check location:       Preferred lab:       Send INR reminders to:   SANDOR LILLY    Comments:         Anticoagulation Care Providers     Provider Role Specialty Phone number    Sony Canchola MD Referring [facility] [phone]            See the Encounter Report to view  Anticoagulation Flowsheet and Dosing Calendar (Go to Encounters tab in chart review, and find the Anticoagulation Therapy Visit)        Layla Garcia RN

## 2020-06-03 ENCOUNTER — ANTICOAGULATION THERAPY VISIT (OUTPATIENT)
Dept: NURSING | Facility: CLINIC | Age: 85
End: 2020-06-03
Payer: COMMERCIAL

## 2020-06-03 DIAGNOSIS — I48.91 ATRIAL FIBRILLATION (H): ICD-10-CM

## 2020-06-03 DIAGNOSIS — I48.20 CHRONIC ATRIAL FIBRILLATION (H): ICD-10-CM

## 2020-06-03 DIAGNOSIS — Z79.01 LONG TERM CURRENT USE OF ANTICOAGULANT THERAPY: ICD-10-CM

## 2020-06-03 LAB
CAPILLARY BLOOD COLLECTION: NORMAL
INR PPP: 2.5 (ref 0.86–1.14)

## 2020-06-03 PROCEDURE — 99207 ZZC NO CHARGE NURSE ONLY: CPT

## 2020-06-03 PROCEDURE — 85610 PROTHROMBIN TIME: CPT | Performed by: FAMILY MEDICINE

## 2020-06-03 PROCEDURE — 36415 COLL VENOUS BLD VENIPUNCTURE: CPT | Performed by: FAMILY MEDICINE

## 2020-06-03 NOTE — PROGRESS NOTES
ANTICOAGULATION FOLLOW-UP CLINIC VISIT    Patient Name:  Linwood Chi  Date:  6/3/2020  Contact Type:  Telephone    SUBJECTIVE:  Patient Findings     Comments:   The patient was assessed for diet, medication, and activity level changes, missed or extra doses, bruising or bleeding, with no problem findings.          Clinical Outcomes     Negatives:   Major bleeding event, Thromboembolic event, Anticoagulation-related hospital admission, Anticoagulation-related ED visit, Anticoagulation-related fatality    Comments:   The patient was assessed for diet, medication, and activity level changes, missed or extra doses, bruising or bleeding, with no problem findings.             OBJECTIVE    Recent labs: (last 7 days)     20  1253   INR 2.50*       ASSESSMENT / PLAN  No question data found.  Anticoagulation Summary  As of 6/3/2020    INR goal:   2.0-3.0   TTR:   78.0 % (1 y)   INR used for dosin.50 (6/3/2020)   Warfarin maintenance plan:   2.5 mg (2.5 mg x 1) every day   Full warfarin instructions:   2.5 mg every day   Weekly warfarin total:   17.5 mg   No change documented:   Layla Garcia RN   Plan last modified:   Layla Garcia RN (10/9/2019)   Next INR check:   7/15/2020   Priority:   INR   Target end date:   Indefinite    Indications    Long-term (current) use of anticoagulants [Z79.01] [Z79.01]  Atrial fibrillation (HCC) [I48.91] [I48.91]  Chronic atrial fibrillation [I48.20]             Anticoagulation Episode Summary     INR check location:       Preferred lab:       Send INR reminders to:   SANDOR LILLY    Comments:         Anticoagulation Care Providers     Provider Role Specialty Phone number    Sony Canchola MD Referring [facility] [phone]            See the Encounter Report to view Anticoagulation Flowsheet and Dosing Calendar (Go to Encounters tab in chart review, and find the Anticoagulation Therapy Visit)        Layla Garcia RN

## 2020-07-15 ENCOUNTER — ANTICOAGULATION THERAPY VISIT (OUTPATIENT)
Dept: NURSING | Facility: CLINIC | Age: 85
End: 2020-07-15

## 2020-07-15 DIAGNOSIS — I48.20 CHRONIC ATRIAL FIBRILLATION (H): ICD-10-CM

## 2020-07-15 DIAGNOSIS — Z79.01 LONG TERM CURRENT USE OF ANTICOAGULANT THERAPY: ICD-10-CM

## 2020-07-15 DIAGNOSIS — I48.91 ATRIAL FIBRILLATION (H): ICD-10-CM

## 2020-07-15 LAB
CAPILLARY BLOOD COLLECTION: NORMAL
INR PPP: 2.6 (ref 0.86–1.14)

## 2020-07-15 PROCEDURE — 36416 COLLJ CAPILLARY BLOOD SPEC: CPT | Performed by: FAMILY MEDICINE

## 2020-07-15 PROCEDURE — 85610 PROTHROMBIN TIME: CPT | Performed by: FAMILY MEDICINE

## 2020-07-15 NOTE — PROGRESS NOTES
ANTICOAGULATION MANAGEMENT     Patient Name:  Linwood Chi  Date:  7/15/2020    ASSESSMENT /SUBJECTIVE:    Today's INR result of 2.6 is therapeutic. Goal INR of 2.0-3.0      Warfarin dose taken: Warfarin taken as previously instructed    Diet: No new diet changes affecting INR    Medication changes/ interactions: No new medications/supplements affecting INR    Previous INR: Therapeutic     S/S of bleeding or thromboembolism: No    New injury or illness: No    Upcoming surgery, procedure or cardioversion: No    Additional findings: None      PLAN:    Spoke with Linwood regarding INR result and instructed:     Warfarin Dosing Instructions: Continue your current warfarin dose 2.5 mg ROW    Instructed patient to follow up no later than: 6 weeks  Lab visit scheduled    Education provided: Target INR goal and significance of current INR result      Linwood verbalizes understanding and agrees to warfarin dosing plan.    Instructed to call the Anticoagulation Clinic for any changes, questions or concerns. (#[phone])        Layla Garcia RN      OBJECTIVE:  Recent labs: (last 7 days)     07/15/20  1302   INR 2.60*         No question data found.  Anticoagulation Summary  As of 7/15/2020    INR goal:   2.0-3.0   TTR:   80.6 % (1 y)   INR used for dosin.60 (7/15/2020)   Warfarin maintenance plan:   2.5 mg (2.5 mg x 1) every day   Full warfarin instructions:   2.5 mg every day   Weekly warfarin total:   17.5 mg   Plan last modified:   Layla Garcia RN (10/9/2019)   Next INR check:   2020   Priority:   INR   Target end date:   Indefinite    Indications    Long-term (current) use of anticoagulants [Z79.01] [Z79.01]  Atrial fibrillation (HCC) [I48.91] [I48.91]  Chronic atrial fibrillation (H) [I48.20]             Anticoagulation Episode Summary     INR check location:       Preferred lab:       Send INR reminders to:   SANDOR LILLY    Comments:         Anticoagulation Care Providers     Provider Role  Specialty Phone number    Sony Canchola MD Referring Family Practice [phone]

## 2020-07-22 DIAGNOSIS — I10 BENIGN ESSENTIAL HYPERTENSION: ICD-10-CM

## 2020-07-22 DIAGNOSIS — E78.5 HYPERLIPIDEMIA WITH TARGET LDL LESS THAN 100: ICD-10-CM

## 2020-07-25 RX ORDER — ATORVASTATIN CALCIUM 40 MG/1
TABLET, FILM COATED ORAL
Qty: 90 TABLET | Refills: 1 | Status: SHIPPED | OUTPATIENT
Start: 2020-07-25 | End: 2021-04-14

## 2020-07-25 NOTE — TELEPHONE ENCOUNTER
Prescription approved per [facility] Refill Protocol.  Nannette Muhammad RN

## 2020-08-26 ENCOUNTER — ANTICOAGULATION THERAPY VISIT (OUTPATIENT)
Dept: NURSING | Facility: CLINIC | Age: 85
End: 2020-08-26

## 2020-08-26 DIAGNOSIS — Z79.01 LONG TERM CURRENT USE OF ANTICOAGULANT THERAPY: ICD-10-CM

## 2020-08-26 DIAGNOSIS — I48.20 CHRONIC ATRIAL FIBRILLATION (H): ICD-10-CM

## 2020-08-26 DIAGNOSIS — I48.91 ATRIAL FIBRILLATION (H): ICD-10-CM

## 2020-08-26 LAB
CAPILLARY BLOOD COLLECTION: NORMAL
INR PPP: 2 (ref 0.86–1.14)

## 2020-08-26 PROCEDURE — 85610 PROTHROMBIN TIME: CPT | Performed by: FAMILY MEDICINE

## 2020-08-26 PROCEDURE — 36416 COLLJ CAPILLARY BLOOD SPEC: CPT | Performed by: FAMILY MEDICINE

## 2020-08-26 NOTE — PROGRESS NOTES
Anticoagulation Management    Unable to reach Linwood today.    Today's INR result of 3.0 is therapeutic (goal INR of 2.0-3.0).  Result received from: Clinic Lab    Follow up required to confirm warfarin dose taken and assess for changes    Will plan to continue current dosing and recheck in 6 weeks, based on patient status. Makenna Antonio RN August 26, 2020 12:23 PM

## 2020-08-26 NOTE — PROGRESS NOTES
LVM for the patient with dosing at 2.5 mg tonight.      Requesting call back to set up next appointment and report any missed or extra doses, changes in diet,health or medications, or any bleeding or clotting episodes. Makenna Antonio RN August 26, 2020 4:59 PM

## 2020-08-27 NOTE — PROGRESS NOTES
ANTICOAGULATION MANAGEMENT     Patient Name:  Linwood Chi  Date:  2020    ASSESSMENT /SUBJECTIVE:    Today's INR result of 2.0 is therapeutic. Goal INR of 2.0-3.0      Warfarin dose taken: Warfarin taken as previously instructed    Diet: No new diet changes affecting INR    Medication changes/ interactions: No new medications/supplements affecting INR    Previous INR: Therapeutic     S/S of bleeding or thromboembolism: No    New injury or illness: No    Upcoming surgery, procedure or cardioversion: No    Additional findings: None      PLAN:    Spoke with Linwood regarding INR result and instructed:     Warfarin Dosing Instructions: Continue your current warfarin dose 2.5 mg daily    Instructed patient to follow up no later than: 4 weeks  Lab visit scheduled    Education provided: None required      Linwood verbalizes understanding and agrees to warfarin dosing plan.    Instructed to call the Anticoagulation Clinic for any changes, questions or concerns. (#[phone])        Marisela Diaz RN      OBJECTIVE:  Recent labs: (last 7 days)     20  1143   INR 2.00*         INR assessment THER    Recheck INR In: 4 WEEKS    INR Location Clinic      Anticoagulation Summary  As of 2020    INR goal:   2.0-3.0   TTR:   84.9 % (1 y)   INR used for dosin.00 (2020)   Warfarin maintenance plan:   2.5 mg (2.5 mg x 1) every day   Full warfarin instructions:   2.5 mg every day   Weekly warfarin total:   17.5 mg   No change documented:   Makenna Heredia RN   Plan last modified:   Layla Garcia RN (10/9/2019)   Next INR check:   2020   Priority:   INR   Target end date:   Indefinite    Indications    Long-term (current) use of anticoagulants [Z79.01] [Z79.01]  Atrial fibrillation (HCC) [I48.91] [I48.91]  Chronic atrial fibrillation (H) [I48.20]             Anticoagulation Episode Summary     INR check location:       Preferred lab:       Send INR reminders to:   SANDOR LILLY     Comments:         Anticoagulation Care Providers     Provider Role Specialty Phone number    Sony Canchola MD [facility] [phone]

## 2020-09-08 ENCOUNTER — NURSE TRIAGE (OUTPATIENT)
Dept: NURSING | Facility: CLINIC | Age: 85
End: 2020-09-08

## 2020-09-08 NOTE — TELEPHONE ENCOUNTER
Additional Information    Negative: Passed out (i.e., fainted, collapsed and was not responding)    Negative: Shock suspected (e.g., cold/pale/clammy skin, too weak to stand, low BP, rapid pulse)    Negative: Sounds like a life-threatening emergency to the triager    Negative: Major injury to the back (e.g., MVA, fall > 10 feet or 3 meters, penetrating injury, etc.)    Negative: Pain in the upper back over the ribs (rib cage) that radiates (travels) into the chest    Negative: Pain in the upper back over the ribs (rib cage) and worsened by coughing (or clearly increases with breathing)    Weakness of a leg or foot (e.g., unable to bear weight, dragging foot)    Negative: SEVERE back pain of sudden onset and age > 60    Negative: SEVERE abdominal pain (e.g., excruciating)    Negative: Abdominal pain and age > 60    Negative: Unable to urinate (or only a few drops) and bladder feels very full    Negative: Loss of bladder or bowel control (urine or bowel incontinence; wetting self, leaking stool) of new onset    Negative: Numbness (loss of sensation) in groin or rectal area    Negative: Pain radiates into groin, scrotum    Negative: Blood in urine (red, pink, or tea-colored)    Negative: Vomiting and pain over lower ribs of back (i.e., flank - kidney area)    Protocols used: BACK PAIN-A-[place]

## 2020-09-08 NOTE — TELEPHONE ENCOUNTER
Back trouble and left leg trouble.    Has tried medication but pain still hurts.  Tried different positions.    Throbbing pain.    Needs to be seen.  He agrees.  Transferred to scheduling    Yoko Fortune RN  [facility] Nurse Advisor

## 2020-09-10 ENCOUNTER — VIRTUAL VISIT (OUTPATIENT)
Dept: FAMILY MEDICINE | Facility: CLINIC | Age: 85
End: 2020-09-10
Payer: COMMERCIAL

## 2020-09-10 DIAGNOSIS — M54.16 LUMBAR RADICULOPATHY: Primary | ICD-10-CM

## 2020-09-10 PROCEDURE — 99213 OFFICE O/P EST LOW 20 MIN: CPT | Mod: 95 | Performed by: PHYSICIAN ASSISTANT

## 2020-09-10 RX ORDER — PREDNISONE 20 MG/1
TABLET ORAL
Qty: 20 TABLET | Refills: 0 | Status: SHIPPED | OUTPATIENT
Start: 2020-09-10 | End: 2020-10-29

## 2020-09-10 NOTE — PROGRESS NOTES
"Linwood Chi is a 89 year old male who is being evaluated via a billable telephone visit.      The patient has been notified of following:     \"This telephone visit will be conducted via a call between you and your physician/provider. We have found that certain health care needs can be provided without the need for a physical exam.  This service lets us provide the care you need with a short phone conversation.  If a prescription is necessary we can send it directly to your pharmacy.  If lab work is needed we can place an order for that and you can then stop by our lab to have the test done at a later time.    Telephone visits are billed at different rates depending on your insurance coverage. During this emergency period, for some insurers they may be billed the same as an in-person visit.  Please reach out to your insurance provider with any questions.    If during the course of the call the physician/provider feels a telephone visit is not appropriate, you will not be charged for this service.\"    Patient has given verbal consent for Telephone visit?  Yes    What phone number would you like to be contacted at? [phone]    How would you like to obtain your AVS? Mail a copy    Subjective     Linwood Chi is a 89 year old male who presents via phone visit today for the following health issues:    HPI    Back Pain  Onset/Duration: 7 days  Description:   Location of pain: low back left  Character of pain: very painful  Pain radiation: radiates into the left leg  New numbness or weakness in legs, not attributed to pain: no   Intensity: Currently 9/10, At its worst 10/10  Progression of Symptoms: worsening  History:   Specific cause: none  Pain interferes with job: not applicable  History of back problems: back injury at age 35. Have back surgery  Any previous MRI or X-rays: Yes- at [place] for x-ray of spine.  Date 09/09/2019  Sees a specialist for back pain: No  Alleviating factors:   Improved by: " "none    Precipitating factors:  Worsened by: Walking  Therapies tried and outcome: none    Accompanying Signs & Symptoms:  Risk of Fracture: None  Risk of Cauda Equina: None  Risk of Infection: None  Risk of Cancer: None  Risk of Ankylosing Spondylitis: Onset at age <35, male, AND morning back stiffness no      Patient has PMHx for back fractures of unknown levels.  Noticed the pain after doing laundry.  Reviewed the possible side effects of Prednisone therapy.  Ice 20 minutes 3-4 x daily follow up per Physical Therapy recommendations.     Review of Systems   Constitutional, HEENT, cardiovascular, pulmonary, gi and gu systems are negative, except as otherwise noted.       Objective          Vitals:  No vitals were obtained today due to virtual visit.    healthy, alert and no distress  PSYCH: Alert and oriented times 3; coherent speech, normal   rate and volume, able to articulate logical thoughts, able   to abstract reason, no tangential thoughts, no hallucinations   or delusions  His affect is normal  RESP: No cough, no audible wheezing, able to talk in full sentences  Remainder of exam unable to be completed due to telephone visits         Assessment/Plan:    Assessment & Plan     Linwood was seen today for back pain and musculoskeletal problem.    Diagnoses and all orders for this visit:    Lumbar radiculopathy  -     predniSONE (DELTASONE) 20 MG tablet; Take 3 tabs by mouth daily x 3 days, then 2 tabs daily x 3 days, then 1 tab daily x 3 days, then 1/2 tab daily x 3 days.  -     [facility] REFERRAL; Future         BMI:   Estimated body mass index is 28.32 kg/m  as calculated from the following:    Height as of 11/1/19: 1.626 m (5' 4\").    Weight as of 2/4/20: 74.8 kg (165 lb).         Return per PT recommendations or if symptoms persist, change or worsen..    Beth Johnson PA-C  [facility]    Phone call duration:  20 minutes              "

## 2020-09-16 ENCOUNTER — OFFICE VISIT (OUTPATIENT)
Dept: FAMILY MEDICINE | Facility: CLINIC | Age: 85
End: 2020-09-16
Payer: COMMERCIAL

## 2020-09-16 ENCOUNTER — OFFICE VISIT (OUTPATIENT)
Dept: AUDIOLOGY | Facility: CLINIC | Age: 85
End: 2020-09-16
Attending: FAMILY MEDICINE
Payer: COMMERCIAL

## 2020-09-16 VITALS
TEMPERATURE: 97.6 F | DIASTOLIC BLOOD PRESSURE: 64 MMHG | WEIGHT: 169 LBS | SYSTOLIC BLOOD PRESSURE: 128 MMHG | OXYGEN SATURATION: 99 % | BODY MASS INDEX: 29.01 KG/M2 | HEART RATE: 76 BPM

## 2020-09-16 DIAGNOSIS — I10 HTN, GOAL BELOW 140/90: ICD-10-CM

## 2020-09-16 DIAGNOSIS — H91.93 BILATERAL HEARING LOSS, UNSPECIFIED HEARING LOSS TYPE: ICD-10-CM

## 2020-09-16 DIAGNOSIS — E11.69 TYPE 2 DIABETES MELLITUS WITH OTHER SPECIFIED COMPLICATION, WITHOUT LONG-TERM CURRENT USE OF INSULIN (H): ICD-10-CM

## 2020-09-16 DIAGNOSIS — H69.91 DISORDER OF RIGHT EUSTACHIAN TUBE: ICD-10-CM

## 2020-09-16 DIAGNOSIS — M54.16 LUMBAR RADICULOPATHY: Primary | ICD-10-CM

## 2020-09-16 DIAGNOSIS — H90.6 MIXED HEARING LOSS, BILATERAL: Primary | ICD-10-CM

## 2020-09-16 LAB
ANION GAP SERPL CALCULATED.3IONS-SCNC: 7 MMOL/L (ref 3–14)
BUN SERPL-MCNC: 26 MG/DL (ref 7–30)
CALCIUM SERPL-MCNC: 8.7 MG/DL (ref 8.5–10.1)
CHLORIDE SERPL-SCNC: 100 MMOL/L (ref 94–109)
CO2 SERPL-SCNC: 27 MMOL/L (ref 20–32)
CREAT SERPL-MCNC: 1.14 MG/DL (ref 0.66–1.25)
CREAT UR-MCNC: 35 MG/DL
GFR SERPL CREATININE-BSD FRML MDRD: 56 ML/MIN/{1.73_M2}
GLUCOSE SERPL-MCNC: 91 MG/DL (ref 70–99)
HBA1C MFR BLD: 6.1 % (ref 0–5.6)
MICROALBUMIN UR-MCNC: 2530 MG/L
MICROALBUMIN/CREAT UR: 7167.14 MG/G CR (ref 0–17)
POTASSIUM SERPL-SCNC: 4 MMOL/L (ref 3.4–5.3)
SODIUM SERPL-SCNC: 134 MMOL/L (ref 133–144)

## 2020-09-16 PROCEDURE — 92550 TYMPANOMETRY & REFLEX THRESH: CPT | Performed by: AUDIOLOGIST

## 2020-09-16 PROCEDURE — 99207 ZZC NO CHARGE LOS: CPT | Performed by: AUDIOLOGIST

## 2020-09-16 PROCEDURE — 92557 COMPREHENSIVE HEARING TEST: CPT | Performed by: AUDIOLOGIST

## 2020-09-16 PROCEDURE — 80048 BASIC METABOLIC PNL TOTAL CA: CPT | Performed by: FAMILY MEDICINE

## 2020-09-16 PROCEDURE — 82043 UR ALBUMIN QUANTITATIVE: CPT | Performed by: FAMILY MEDICINE

## 2020-09-16 PROCEDURE — 99213 OFFICE O/P EST LOW 20 MIN: CPT | Performed by: FAMILY MEDICINE

## 2020-09-16 PROCEDURE — 36415 COLL VENOUS BLD VENIPUNCTURE: CPT | Performed by: FAMILY MEDICINE

## 2020-09-16 PROCEDURE — 83036 HEMOGLOBIN GLYCOSYLATED A1C: CPT | Performed by: FAMILY MEDICINE

## 2020-09-16 NOTE — PROGRESS NOTES
Subjective     Linwood Chi is a 89 year old male who presents to clinic today for the following health issues:    HPI     Chief Complaint   Patient presents with     Back Pain     f/u-now having left leg pain which radiates all the way to left foot     He was seen on 9/10/20 for Lumbar radiculopathy, given prednisone and referred to PT but not followed.  He reports that it is getting better.  Walks with a walker.    LUMBAR SPINE TWO-THREE  VIEWS  9/9/2019 1:13 PM      HISTORY: Chronic bilateral low back pain, with sciatica presence  unspecified; Chronic bilateral low back pain, with sciatica presence  unspecified  COMPARISON: None.  FINDINGS: There is scoliosis convex to the left. Patient is status  post a vertebroplasty at the T12 level. Is mild compression of the  inferior endplate of L1. The age of the compression fracture of the  inferior endplate of L1 is indeterminate. Grade 1 anterior  spondylolisthesis of L5 on S1. This appears to be due to degenerative  change in the facet joints. There is loss of disc space height at the  L4-5 and L5-S1 levels..      Hearing Loss:    He has had a hard time hearing. Encouraged him to follow up for hearing aid assessment.    HTN:   Well controlled on Amlodipine and Lisinopril     Diabetes Mellitus:     Well controlled.    Review of Systems   HEENT, cardiovascular, pulmonary, gi and gu systems are negative, except as otherwise noted.      Objective    /64   Pulse 76   Temp 97.6  F (36.4  C) (Oral)   Wt 76.7 kg (169 lb)   SpO2 99%   BMI 29.01 kg/m    Body mass index is 29.01 kg/m .  Physical Exam   GENERAL: frail elderly male, alert and no distress  NECK: no adenopathy and thyroid normal to palpation  RESP: lungs clear to auscultation - no rales, rhonchi or wheezes  CV: regular rate and rhythm, no murmur, click or rub  MS: frail man, uses walker    Assessment & Plan     Linwood was seen today for back pain.    Diagnoses and all orders for this  visit:    Lumbar radiculopathy    Type 2 diabetes mellitus with other specified complication, without long-term current use of insulin (H)  -     Hemoglobin A1c  -     Basic metabolic panel  -     Albumin Random Urine Quantitative with Creat Ratio    HTN, goal below 140/90  -     Basic metabolic panel    Bilateral hearing loss, unspecified hearing loss type  -     AUDIOLOGY ADULT REFERRAL; Future      Return in about 3 months (around 12/16/2020) for Routine Visit.    Sony Canchola MD  [facility]

## 2020-09-16 NOTE — LETTER
September 29, 2020      Linwood Chi  [address] [address] [id number]          Dear ,    We are writing to inform you of your test results.  Urine albumin worsened recheck in 3 months if still high will consider evaluation by nephrology. A1c normal and kidney function at baseline.       Resulted Orders   Hemoglobin A1c   Result Value Ref Range    Hemoglobin A1C 6.1 (H) 0 - 5.6 %      Comment:      Normal <5.7% Prediabetes 5.7-6.4%  Diabetes 6.5% or higher - adopted from ADA   consensus guidelines.  CORRECTED ON 09/16 AT 1039: PREVIOUSLY REPORTED AS 7.0 Normal <5.7%   Prediabetes 5.7 6.4%  Diabetes 6.5% or higher   adopted from ADA consensus   guidelines.     Basic metabolic panel   Result Value Ref Range    Sodium 134 133 - 144 mmol/L    Potassium 4.0 3.4 - 5.3 mmol/L    Chloride 100 94 - 109 mmol/L    Carbon Dioxide 27 20 - 32 mmol/L    Anion Gap 7 3 - 14 mmol/L    Glucose 91 70 - 99 mg/dL    Urea Nitrogen 26 7 - 30 mg/dL    Creatinine 1.14 0.66 - 1.25 mg/dL    GFR Estimate 56 (L) >60 mL/min/[1.73_m2]      Comment:      Non  GFR Calc  Starting 12/18/2018, serum creatinine based estimated GFR (eGFR) will be   calculated using the Chronic Kidney Disease Epidemiology Collaboration   (CKD-EPI) equation.      GFR Estimate If Black 65 >60 mL/min/[1.73_m2]      Comment:       GFR Calc  Starting 12/18/2018, serum creatinine based estimated GFR (eGFR) will be   calculated using the Chronic Kidney Disease Epidemiology Collaboration   (CKD-EPI) equation.      Calcium 8.7 8.5 - 10.1 mg/dL   Albumin Random Urine Quantitative with Creat Ratio   Result Value Ref Range    Creatinine Urine 35 mg/dL    Albumin Urine mg/L 2,530 mg/L    Albumin Urine mg/g Cr 7,167.14 (H) 0 - 17 mg/g Cr       If you have any questions or concerns, please call the clinic at the number listed above.       Sincerely,        Sony Canchola MD

## 2020-09-16 NOTE — PROGRESS NOTES
AUDIOLOGY REPORT    SUBJECTIVE:  Linwood Chi is a 89 year old male who was seen in the Audiology Clinic [facility] on 9/16/20 for audiologic evaluation, referred by Sony Canchola MD.   The patient reports a gradual decline in hearing, a history of noise exposure working on a , and a history of middle ear issues which were resolved after he had his tonsils out at the age of 13. The patient denies  bilateral tinnitus, bilateral otalgia, bilateral drainage, bilateral aural fullness, family history of hearing loss, and dizziness. The patient notes difficulty with communication in a variety of listening situations. Patient was unaccompanied to today's visit.     Abuse Screening:  Do you feel unsafe at home or work/school? No  Do you feel threatened by someone? No  Does anyone try to keep you from having contact with others, or doing things outside of your home? No  Physical signs of abuse present? No     OBJECTIVE:    Otoscopic exam indicates ears are clear of cerumen bilaterally     Pure Tone Thresholds assessed using standard techniques  audiometry with good  reliability from 250-8000 Hz bilaterally using insert earphones and circumaural headphones     RIGHT:  moderate and profound mixed hearing loss    LEFT:    moderate and profound mixed hearing loss    NOTE: Change in transducers did not merit a change in thresholds.     Tympanogram:    RIGHT: restricted eardrum mobility (Type As)    LEFT:   normal eardrum mobility    Reflexes (reported by stimulus ear): 1000 Hz  RIGHT: Ipsilateral is absent at frequencies tested  RIGHT: Contralateral is absent at frequencies tested  LEFT:   Ipsilateral is absent at frequencies tested  LEFT:   Contralateral is absent at frequencies tested    Speech Reception Threshold:    RIGHT: 70 dB HL    LEFT:   60 dB HL    Word Recognition Score:     RIGHT: 80% at 95 dB HL using NU-6 recorded word list.    LEFT:   76% at 95 dB HL using NU-6 recorded  word list.    ASSESSMENT:   Bilateral mixed hearing loss     Today s results were discussed with the patient in detail.     PLAN:  Patient was counseled regarding hearing loss and impact on communication.  Patient is a good candidate for amplification at this time. Handout on good communication strategies, and hearing aid use was given to patient. It is recommended that the patient see ENT for the slight conductive component to his bilateral hearing loss and for medical clearance for hearing aids. Following his ENT appointment patient should return to audiology for a hearing aid consultation. Please call this clinic with questions regarding these results or recommendations.    [facility]  Licensed Audiologist #[id number]  9/16/2020    CC: Dr. Canchola

## 2020-09-21 NOTE — PROGRESS NOTES
Chief Complaint - Hearing loss    History of Present Illness - Linwood Chi is a 89 year old male who presents to me today with hearing loss in both ears.  It has been present and noticeable for approximately years. The patient has noticed increased difficulty hearing certain sounds and difficulty in understanding others, especially in noisy or crowded situations.  With regards to recreational, , and work-related noise exposure he had some. + family history of hearing loss in dad. The patient denies otorrhea and otalgia. H/o T&A. No ear surgeries. Had ear infections as a child.    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     DM (diabetes mellitus), type 2 with renal complications (H)     A-fib (H)     Osteoarthritis of left knee     Onychomycosis     Long-term (current) use of anticoagulants [Z79.01]     Atrial fibrillation (HCC) [I48.91]     Benign essential hypertension     Gastroesophageal reflux disease without esophagitis     Type 2 diabetes mellitus with diabetic nephropathy, without long-term current use of insulin (H)     Albuminuria     Chronic atrial fibrillation (H)     Abdominal aortic aneurysm (AAA) without rupture (H)       Current Medications -   Current Outpatient Medications:      acetaminophen (TYLENOL) 500 MG tablet, Take 1-2 tablets (500-1,000 mg) by mouth every 8 hours as needed for mild pain ((no more than 3000 mg in 24 hours)), Disp: 60 tablet, Rfl: 1     amLODIPine (NORVASC) 5 MG tablet, For HTN, Disp: 90 tablet, Rfl: 3     atorvastatin (LIPITOR) 40 MG tablet, TAKE 1 TABLET(40 MG) BY MOUTH DAILY, Disp: 90 tablet,  Rfl: 1     dorzolamide-timolol (COSOPT) 2-0.5 % ophthalmic solution, Place 1 drop Into the left eye 2 times daily, Disp: , Rfl: 6     FLUAD 0.5 ML YOGI, , Disp: , Rfl:      latanoprost (XALATAN) 0.005 % ophthalmic solution, Place 1 drop Into the left eye daily, Disp: , Rfl: 3     lidocaine (XYLOCAINE) 2 % external gel, Apply topically 3 times daily, Disp: 60 mL, Rfl: 1     lisinopril (PRINIVIL/ZESTRIL) 20 MG tablet, TAKE 1 TABLET BY MOUTH DAILY, Disp: 90 tablet, Rfl: 1     metFORMIN (GLUCOPHAGE) 500 MG tablet, For diabetes, Disp: 180 tablet, Rfl: 1     metoprolol tartrate (LOPRESSOR) 25 MG tablet, Take one a day, Disp: 90 tablet, Rfl: 1     omeprazole (PRILOSEC) 20 MG DR capsule, TAKE 1 CAPSULE BY MOUTH EVERY DAY 30 TO 60 MINUTES BEFORE A MEAL, Disp: 90 capsule, Rfl: 2     order for DME, Equipment being ordered: Mild compression hose, Disp: 1 each, Rfl: 0     predniSONE (DELTASONE) 20 MG tablet, Take 3 tabs by mouth daily x 3 days, then 2 tabs daily x 3 days, then 1 tab daily x 3 days, then 1/2 tab daily x 3 days., Disp: 20 tablet, Rfl: 0     warfarin ANTICOAGULANT (COUMADIN) 2.5 MG tablet, Take 1 tablet (2.5 mg) by mouth daily OR AS DIRECTED BY INR NURSE, Disp: 90 tablet, Rfl: 2    Allergies -   Allergies   Allergen Reactions     Crestor [Rosuvastatin] Cramps     Penicillins      Swelling        Social History -   Social History     Socioeconomic History     Marital status:      Spouse name: Not on file     Number of children: Not on file     Years of education: Not on file     Highest education level: Not on file   Occupational History     Not on file   Social Needs     Financial resource strain: Not on file     Food insecurity     Worry: Not on file     Inability: Not on file     Transportation needs     Medical: Not on file     Non-medical: Not on file   Tobacco Use     Smoking status: Former Smoker     Years: 50.00     Types: Cigarettes     Last attempt to quit: 3/4/1992     Years since quittin.5      Smokeless tobacco: Former User   Substance and Sexual Activity     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     Drug use: No     Sexual activity: Never   Lifestyle     Physical activity     Days per week: Not on file     Minutes per session: Not on file     Stress: Not on file   Relationships     Social connections     Talks on phone: Not on file     Gets together: Not on file     Attends [employer] service: Not on file     Active member of club or organization: Not on file     Attends meetings of clubs or organizations: Not on file     Relationship status: Not on file     Intimate partner violence     Fear of current or ex partner: Not on file     Emotionally abused: Not on file     Physically abused: Not on file     Forced sexual activity: Not on file   Other Topics Concern     Parent/sibling w/ CABG, MI or angioplasty before 65F 55M? Not Asked   Social History Narrative     Not on file       Family History -   Family History   Problem Relation Age of Onset     Asthma Father      Diabetes Paternal Grandmother      Cerebrovascular Disease Brother      Hypertension Brother      Diabetes Sister      Psychotic Disorder Sister        Review of Systems - As per HPI and PMHx, otherwise 7 system review is negative.    Physical Exam  BP (!) 187/83   Pulse 76   Wt 75 kg (165 lb 6.4 oz)   SpO2 97%   BMI 28.39 kg/m    General - The patient is in no distress.  Alert and oriented to person and place, answers questions and cooperates with examination appropriately.   Voice and Breathing - The patient was breathing comfortably without the use of accessory muscles. There was no wheezing, stridor, or stertor.  The patients voice was clear and strong.  Ears - The auricles are normal. Both canals impacted with cerumen. See below. Once clean, the tympanic membranes are normal in appearance, bony landmarks are intact.  No retraction, perforation, or masses. Some tympanosclerosis worse on the right. No fluid or purulence was  seen in the external canal or the middle ear. No evidence of infection of the middle ear or external canal, cerumen was normal in appearance.  Eyes - Extraocular movements intact.  Sclera were not icteric or injected.  Neck - Soft, non-tender. Palpation of the occipital, submental, submandibular, internal jugular chain, and supraclavicular nodes did not demonstrate any abnormal lymph nodes or masses. Parotid glands had no masses. Palpation of the thyroid was soft and smooth, with no nodules or goiter appreciated.  The trachea was mobile and midline.  Neurological - Cranial nerves 2 through 12 were grossly intact. House-Brackmann grade 1 out of 6 bilaterally.       Physical Exam and Procedure  Ears - On examination of the ears, I found that both ears were impacted with cerumen.  Therefore, I positioned the patient in the examination chair in a semi-supine position. I used the binocular surgical microscope to perform cerumen removal.  On the right side, I began by using a cerumen loop to gently lift the edges of the cerumen mass away from the walls of the external canal.  Once I did this, I was able to pull away fragments of wax and debris. I removed all the wax and debri.  The tympanic membrane was intact, but with some tympanosclerosis, no sign of perforation or middle ear effusion.    I turned my attention to the left side once again using the binocular surgical microscope to perform cerumen removal.  I began by using a cerumen loop to gently lift the edges of the cerumen mass away from the walls of the external canal.  Once I did this, I was able to pull away fragments of wax and debris. I removed all the wax and debri. The tympanic membrane was intact, no sign of perforation or middle ear effusion.      Audiologic Studies - An audiogram and tympanogram was performed 9/16/2020 as part of the evaluation and personally reviewed. The tympanogram shows a Type As curve right, type A left.  The audiogram showed a  significant down-sloping low to high frequency sensorineural hearing loss, with a mild component both ears.       Assessment and Plan - Linwood hCi is a 89 year old male who presents to me today with hearing loss.  This is most consistent with presbycusis. I can find no evidence of serious CNS disorders or other complicating factors that could be causing this. He has a little tympanosclerosis likely causing As tymps and maybe a small conductive component. I have recommended yearly audiograms. I recommend a hearing aid consultation and hearing aids.    Skyler Atkins MD  Otolaryngology  [facility]

## 2020-09-22 ENCOUNTER — OFFICE VISIT (OUTPATIENT)
Dept: OTOLARYNGOLOGY | Facility: CLINIC | Age: 85
End: 2020-09-22
Payer: COMMERCIAL

## 2020-09-22 VITALS
OXYGEN SATURATION: 97 % | WEIGHT: 165.4 LBS | DIASTOLIC BLOOD PRESSURE: 83 MMHG | SYSTOLIC BLOOD PRESSURE: 187 MMHG | BODY MASS INDEX: 28.39 KG/M2 | HEART RATE: 76 BPM

## 2020-09-22 DIAGNOSIS — H61.23 BILATERAL IMPACTED CERUMEN: ICD-10-CM

## 2020-09-22 DIAGNOSIS — H90.3 SENSORINEURAL HEARING LOSS (SNHL) OF BOTH EARS: Primary | ICD-10-CM

## 2020-09-22 PROCEDURE — 69210 REMOVE IMPACTED EAR WAX UNI: CPT | Performed by: OTOLARYNGOLOGY

## 2020-09-22 PROCEDURE — 99203 OFFICE O/P NEW LOW 30 MIN: CPT | Mod: 25 | Performed by: OTOLARYNGOLOGY

## 2020-09-22 NOTE — LETTER
9/22/2020         RE: Linwood Chi  [address] [facility] [id number]        Dear Colleague,    Thank you for referring your patient, Linwood Chi, to the [facility]. Please see a copy of my visit note below.    Chief Complaint - Hearing loss    History of Present Illness - Linwood Chi is a 89 year old male who presents to me today with hearing loss in both ears.  It has been present and noticeable for approximately years. The patient has noticed increased difficulty hearing certain sounds and difficulty in understanding others, especially in noisy or crowded situations.  With regards to recreational, , and work-related noise exposure he had some. + family history of hearing loss in dad. The patient denies otorrhea and otalgia. H/o T&A. No ear surgeries. Had ear infections as a child.    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     DM (diabetes mellitus), type 2 with renal complications (H)     A-fib (H)     Osteoarthritis of left knee     Onychomycosis     Long-term (current) use of anticoagulants [Z79.01]     Atrial fibrillation (HCC) [I48.91]     Benign essential hypertension     Gastroesophageal reflux disease without esophagitis     Type 2 diabetes mellitus with diabetic nephropathy, without long-term current use of insulin (H)     Albuminuria     Chronic atrial fibrillation (H)     Abdominal aortic aneurysm (AAA) without rupture (H)       Current Medications -   Current Outpatient Medications:      acetaminophen (TYLENOL) 500 MG tablet, Take 1-2 tablets (500-1,000 mg) by mouth every 8  hours as needed for mild pain ((no more than 3000 mg in 24 hours)), Disp: 60 tablet, Rfl: 1     amLODIPine (NORVASC) 5 MG tablet, For HTN, Disp: 90 tablet, Rfl: 3     atorvastatin (LIPITOR) 40 MG tablet, TAKE 1 TABLET(40 MG) BY MOUTH DAILY, Disp: 90 tablet, Rfl: 1     dorzolamide-timolol (COSOPT) 2-0.5 % ophthalmic solution, Place 1 drop Into the left eye 2 times daily, Disp: , Rfl: 6     FLUAD 0.5 ML YOGI, , Disp: , Rfl:      latanoprost (XALATAN) 0.005 % ophthalmic solution, Place 1 drop Into the left eye daily, Disp: , Rfl: 3     lidocaine (XYLOCAINE) 2 % external gel, Apply topically 3 times daily, Disp: 60 mL, Rfl: 1     lisinopril (PRINIVIL/ZESTRIL) 20 MG tablet, TAKE 1 TABLET BY MOUTH DAILY, Disp: 90 tablet, Rfl: 1     metFORMIN (GLUCOPHAGE) 500 MG tablet, For diabetes, Disp: 180 tablet, Rfl: 1     metoprolol tartrate (LOPRESSOR) 25 MG tablet, Take one a day, Disp: 90 tablet, Rfl: 1     omeprazole (PRILOSEC) 20 MG DR capsule, TAKE 1 CAPSULE BY MOUTH EVERY DAY 30 TO 60 MINUTES BEFORE A MEAL, Disp: 90 capsule, Rfl: 2     order for DME, Equipment being ordered: Mild compression hose, Disp: 1 each, Rfl: 0     predniSONE (DELTASONE) 20 MG tablet, Take 3 tabs by mouth daily x 3 days, then 2 tabs daily x 3 days, then 1 tab daily x 3 days, then 1/2 tab daily x 3 days., Disp: 20 tablet, Rfl: 0     warfarin ANTICOAGULANT (COUMADIN) 2.5 MG tablet, Take 1 tablet (2.5 mg) by mouth daily OR AS DIRECTED BY INR NURSE, Disp: 90 tablet, Rfl: 2    Allergies -   Allergies   Allergen Reactions     Crestor [Rosuvastatin] Cramps     Penicillins      Swelling        Social History -   Social History     Socioeconomic History     Marital status:      Spouse name: Not on file     Number of children: Not on file     Years of education: Not on file     Highest education level: Not on file   Occupational History     Not on file   Social Needs     Financial resource strain: Not on file     Food insecurity     Worry: Not on file      Inability: Not on file     Transportation needs     Medical: Not on file     Non-medical: Not on file   Tobacco Use     Smoking status: Former Smoker     Years: 50.00     Types: Cigarettes     Last attempt to quit: 3/4/1992     Years since quittin.5     Smokeless tobacco: Former User   Substance and Sexual Activity     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      Drug use: No     Sexual activity: Never   Lifestyle     Physical activity     Days per week: Not on file     Minutes per session: Not on file     Stress: Not on file   Relationships     Social connections     Talks on phone: Not on file     Gets together: Not on file     Attends [employer] service: Not on file     Active member of club or organization: Not on file     Attends meetings of clubs or organizations: Not on file     Relationship status: Not on file     Intimate partner violence     Fear of current or ex partner: Not on file     Emotionally abused: Not on file     Physically abused: Not on file     Forced sexual activity: Not on file   Other Topics Concern     Parent/sibling w/ CABG, MI or angioplasty before 65F 55M? Not Asked   Social History Narrative     Not on file       Family History -   Family History   Problem Relation Age of Onset     Asthma Father      Diabetes Paternal Grandmother      Cerebrovascular Disease Brother      Hypertension Brother      Diabetes Sister      Psychotic Disorder Sister        Review of Systems - As per HPI and PMHx, otherwise 7 system review is negative.    Physical Exam  BP (!) 187/83   Pulse 76   Wt 75 kg (165 lb 6.4 oz)   SpO2 97%   BMI 28.39 kg/m    General - The patient is in no distress.  Alert and oriented to person and place, answers questions and cooperates with examination appropriately.   Voice and Breathing - The patient was breathing comfortably without the use of accessory muscles. There was no wheezing, stridor, or stertor.  The patients voice was clear and strong.  Ears - The  auricles are normal. Both canals impacted with cerumen. See below. Once clean, the tympanic membranes are normal in appearance, bony landmarks are intact.  No retraction, perforation, or masses. Some tympanosclerosis worse on the right. No fluid or purulence was seen in the external canal or the middle ear. No evidence of infection of the middle ear or external canal, cerumen was normal in appearance.  Eyes - Extraocular movements intact.  Sclera were not icteric or injected.  Neck - Soft, non-tender. Palpation of the occipital, submental, submandibular, internal jugular chain, and supraclavicular nodes did not demonstrate any abnormal lymph nodes or masses. Parotid glands had no masses. Palpation of the thyroid was soft and smooth, with no nodules or goiter appreciated.  The trachea was mobile and midline.  Neurological - Cranial nerves 2 through 12 were grossly intact. House-Brackmann grade 1 out of 6 bilaterally.       Physical Exam and Procedure  Ears - On examination of the ears, I found that both ears were impacted with cerumen.  Therefore, I positioned the patient in the examination chair in a semi-supine position. I used the binocular surgical microscope to perform cerumen removal.  On the right side, I began by using a cerumen loop to gently lift the edges of the cerumen mass away from the walls of the external canal.  Once I did this, I was able to pull away fragments of wax and debris. I removed all the wax and debri.  The tympanic membrane was intact, but with some tympanosclerosis, no sign of perforation or middle ear effusion.    I turned my attention to the left side once again using the binocular surgical microscope to perform cerumen removal.  I began by using a cerumen loop to gently lift the edges of the cerumen mass away from the walls of the external canal.  Once I did this, I was able to pull away fragments of wax and debris. I removed all the wax and debri. The tympanic membrane was intact, no  sign of perforation or middle ear effusion.      Audiologic Studies - An audiogram and tympanogram was performed 9/16/2020 as part of the evaluation and personally reviewed. The tympanogram shows a Type As curve right, type A left.  The audiogram showed a significant down-sloping low to high frequency sensorineural hearing loss, with a mild component both ears.       Assessment and Plan - Linwood Chi is a 89 year old male who presents to me today with hearing loss.  This is most consistent with presbycusis. I can find no evidence of serious CNS disorders or other complicating factors that could be causing this. He has a little tympanosclerosis likely causing As tymps and maybe a small conductive component. I have recommended yearly audiograms. I recommend a hearing aid consultation and hearing aids.    Skyler Atkins MD  Otolaryngology  [facility]        Again, thank you for allowing me to participate in the care of your patient.        Sincerely,        Skyler Atkins MD

## 2020-09-23 ENCOUNTER — ANTICOAGULATION THERAPY VISIT (OUTPATIENT)
Dept: NURSING | Facility: CLINIC | Age: 85
End: 2020-09-23

## 2020-09-23 DIAGNOSIS — I48.91 ATRIAL FIBRILLATION (H): ICD-10-CM

## 2020-09-23 DIAGNOSIS — Z79.01 LONG TERM CURRENT USE OF ANTICOAGULANT THERAPY: ICD-10-CM

## 2020-09-23 DIAGNOSIS — I48.20 CHRONIC ATRIAL FIBRILLATION (H): ICD-10-CM

## 2020-09-23 LAB
CAPILLARY BLOOD COLLECTION: NORMAL
INR PPP: 3.5 (ref 0.86–1.14)

## 2020-09-23 PROCEDURE — 85610 PROTHROMBIN TIME: CPT | Performed by: FAMILY MEDICINE

## 2020-09-23 PROCEDURE — 36416 COLLJ CAPILLARY BLOOD SPEC: CPT | Performed by: FAMILY MEDICINE

## 2020-09-23 NOTE — PROGRESS NOTES
ANTICOAGULATION MANAGEMENT     Patient Name:  Linwood Chi  Date:  9/23/2020    ASSESSMENT /SUBJECTIVE:    Today's INR result of 3.5 is supratherapeutic. Goal INR of 2.0-3.0      Warfarin dose taken: Warfarin taken as instructed    Diet: No new diet changes affecting INR    Medication changes/ interactions: Interaction between Prednisone stopped on 9/22/2020 and warfarin may be affecting INR    Previous INR: Therapeutic     S/S of bleeding or thromboembolism: No    New injury or illness: Yes: just not feeling well lately, pain in his back    Upcoming surgery, procedure or cardioversion: No    Additional findings: None      PLAN:    Telephone call with Linwood regarding INR result and instructed:     Warfarin Dosing Instructions: hold tomorrow (already took today's dose) then continue your current warfarin dose of 2.5 mg ROW    Instructed patient to follow up no later than: 2 weeks  Lab visit scheduled    Education provided: Target INR goal and significance of current INR result and Potential interaction between warfarin and Prednisone      Linwood verbalizes understanding and agrees to warfarin dosing plan.    Instructed to call the Anticoagulation Clinic for any changes, questions or concerns. (#[phone])        Layla Garcia RN      OBJECTIVE:  Recent labs: (last 7 days)     09/23/20  1138   INR 3.50*         No question data found.  Anticoagulation Summary  As of 9/23/2020    INR goal:   2.0-3.0   TTR:   82.4 % (1 y)   INR used for dosing:   3.50! (9/23/2020)   Warfarin maintenance plan:   2.5 mg (2.5 mg x 1) every day   Full warfarin instructions:   9/23: Hold; Otherwise 2.5 mg every day   Weekly warfarin total:   17.5 mg   Plan last modified:   Layla Garcia RN (10/9/2019)   Next INR check:   10/7/2020   Priority:   INR   Target end date:   Indefinite    Indications    Long-term (current) use of anticoagulants [Z79.01] [Z79.01]  Atrial fibrillation (HCC) [I48.91] [I48.91]  Chronic atrial  fibrillation (H) [I48.20]             Anticoagulation Episode Summary     INR check location:       Preferred lab:       Send INR reminders to:   SANDOR LILLY    Comments:         Anticoagulation Care Providers     Provider Role Specialty Phone number    Sony Canchola MD [facility] [phone]

## 2020-09-29 ENCOUNTER — NURSE TRIAGE (OUTPATIENT)
Dept: FAMILY MEDICINE | Facility: CLINIC | Age: 85
End: 2020-09-29

## 2020-09-29 NOTE — TELEPHONE ENCOUNTER
Reason for call:  Symptom   Symptom or request: Leg and back pain    Duration (how long have symptoms been present): 2 months  Have you been treated for this before? Yes    Additional comments: Hurts more when he walks    Phone number to reach patient:  Home number on file [phone] (home)    Best Time:  any    Can we leave a detailed message on this number?  YES    Travel screening: Not Applicable

## 2020-09-30 NOTE — TELEPHONE ENCOUNTER
He can take 500-1000 mg of Tylenol for pain control. If this is not helpful, will need appointment to discuss alternatives.    Mely Pereyra, CNP

## 2020-09-30 NOTE — TELEPHONE ENCOUNTER
Spoke with patient. He was seen by Beth Johnson 09/10 for back pain, then had f/u 09/16 with Dr. Canchola. He has completed prednisone medication. He has not scheduled with PT, did not seem to know that this was recommended and that referral was placed. He states that seeing them would be pointless, he does his own exercises and stretches. His left leg and back are bothering him right now. The pain goes away for a little while then comes back, it felt better for one day while on prednisone, but then went back to the same. He declines any recent injury. States that he had crushed knee cap in that leg about 50 years ago and never got it fixed. He can walk on it currently, but it is sore. He uses a walker at home. He state that he needs a pain medication to help provide relief so that he can sleep tonight.   Pharmacy cued. Please advise.

## 2020-09-30 NOTE — TELEPHONE ENCOUNTER
Pt states that he has been taking 650 mg of arthritis tylenol for pain. He also tries ice for the pain and this helps a little bit. He scheduled telephone appt for 10/05.

## 2020-10-05 ENCOUNTER — VIRTUAL VISIT (OUTPATIENT)
Dept: FAMILY MEDICINE | Facility: CLINIC | Age: 85
End: 2020-10-05
Payer: COMMERCIAL

## 2020-10-05 DIAGNOSIS — M54.10 RADICULAR PAIN OF LOWER EXTREMITY: Primary | ICD-10-CM

## 2020-10-05 DIAGNOSIS — M51.369 DDD (DEGENERATIVE DISC DISEASE), LUMBAR: ICD-10-CM

## 2020-10-05 PROCEDURE — 99213 OFFICE O/P EST LOW 20 MIN: CPT | Mod: 95 | Performed by: FAMILY MEDICINE

## 2020-10-05 RX ORDER — INFLUENZA A VIRUS A/MICHIGAN/45/2015 X-275 (H1N1) ANTIGEN (FORMALDEHYDE INACTIVATED), INFLUENZA A VIRUS A/SINGAPORE/INFIMH-16-0019/2016 IVR-186 (H3N2) ANTIGEN (FORMALDEHYDE INACTIVATED), INFLUENZA B VIRUS B/PHUKET/3073/2013 ANTIGEN (FORMALDEHYDE INACTIVATED), AND INFLUENZA B VIRUS B/MARYLAND/15/2016 BX-69A ANTIGEN (FORMALDEHYDE INACTIVATED) 60; 60; 60; 60 UG/.7ML; UG/.7ML; UG/.7ML; UG/.7ML
INJECTION, SUSPENSION INTRAMUSCULAR
COMMUNITY
Start: 2020-09-16 | End: 2021-04-14

## 2020-10-05 NOTE — PROGRESS NOTES
"Linwood Chi is a 89 year old male who is being evaluated via a billable telephone visit.      The patient has been notified of following:     \"This telephone visit will be conducted via a call between you and your physician/provider. We have found that certain health care needs can be provided without the need for a physical exam.  This service lets us provide the care you need with a short phone conversation.  If a prescription is necessary we can send it directly to your pharmacy.  If lab work is needed we can place an order for that and you can then stop by our lab to have the test done at a later time.    Telephone visits are billed at different rates depending on your insurance coverage. During this emergency period, for some insurers they may be billed the same as an in-person visit.  Please reach out to your insurance provider with any questions.    If during the course of the call the physician/provider feels a telephone visit is not appropriate, you will not be charged for this service.\"    Patient has given verbal consent for Telephone visit?  Yes    What phone number would you like to be contacted at? [phone]     How would you like to obtain your AVS? Mail a copy    Subjective     Linwood Chi is a 89 year old male who presents via phone visit today for the following health issues:    HPI     Chief Complaint   Patient presents with     Back Pain     and leg pain f/u-discuss medication options     Back and Leg pain; taking tylenol arthritis 650 mg three times a day.  Still having pain in the left side; hip, knee and ankle area.  Also has tingling by the toes    Back X ray: 9/9/2019  IMPRESSION:   1. T12 vertebroplasty.  2. Age-indeterminate compression fracture inferior endplate of L1.  3. Grade 1 anterior spondylolisthesis of L5 on S1.  4. Degenerative change with loss of disc space height at L4-5 and  L5-S1.  5. Patient status post placement of a aortic stent graft.    "     Assessment/Plan:    Assessment & Plan     Linwood was seen today for back pain.    Diagnoses and all orders for this visit:    Radicular pain of lower extremity   Given the tingling in the toes discussed evaluation with MRI  -     MR Lumbar Spine w/o Contrast; Future    DDD (degenerative disc disease), lumbar  -     MR Lumbar Spine w/o Contrast; Future      Sony Canchola MD  [facility]    Phone call duration: 8 minutes

## 2020-10-06 ENCOUNTER — OFFICE VISIT (OUTPATIENT)
Dept: AUDIOLOGY | Facility: CLINIC | Age: 85
End: 2020-10-06
Payer: COMMERCIAL

## 2020-10-06 DIAGNOSIS — H90.6 MIXED HEARING LOSS, BILATERAL: Primary | ICD-10-CM

## 2020-10-06 PROCEDURE — 99207 PR NO CHARGE LOS: CPT | Performed by: AUDIOLOGIST

## 2020-10-06 NOTE — PROGRESS NOTES
AUDIOLOGY REPORT    SUBJECTIVE: Linwood Chi is a 89 year old male was seen in the Audiology Clinic at [facility] on 10/06/20 to discuss concerns with hearing and functional communication difficulties. Linwood has been seen previously on 9/16/2020, and results revealed a bilateral mixed hearing loss.  The patient was medically evaluated and determined to be cleared for binaural hearing aids by Dr. Atkins. Linwood notes difficulty with communication in a variety of listening situations.    OBJECTIVE:  Patient is a hearing aid candidate. Patient reports they have insurance benefits for hearing aids. It was determined that benefits are through [employer]. Patient would like to explore this benefit. I explained the differences between care models from [facility] and [facility] and that if they felt uncomfortable or pressured with the [employer] dealer they were always welcome to return to [facility].       ASSESSMENT:   Mixed hearing loss bilateral       PLAN: Please contact this clinic with any questions or concerns.      Mc Feliz CCC-A  Licensed Audiologist #[id number]  10/6/2020

## 2020-10-07 ENCOUNTER — ANTICOAGULATION THERAPY VISIT (OUTPATIENT)
Dept: NURSING | Facility: CLINIC | Age: 85
End: 2020-10-07

## 2020-10-07 DIAGNOSIS — I48.91 ATRIAL FIBRILLATION (H): ICD-10-CM

## 2020-10-07 DIAGNOSIS — Z79.01 LONG TERM CURRENT USE OF ANTICOAGULANT THERAPY: ICD-10-CM

## 2020-10-07 DIAGNOSIS — I48.20 CHRONIC ATRIAL FIBRILLATION (H): ICD-10-CM

## 2020-10-07 LAB
CAPILLARY BLOOD COLLECTION: NORMAL
INR PPP: 3.3 (ref 0.86–1.14)

## 2020-10-07 PROCEDURE — 36416 COLLJ CAPILLARY BLOOD SPEC: CPT | Performed by: FAMILY MEDICINE

## 2020-10-07 PROCEDURE — 85610 PROTHROMBIN TIME: CPT | Performed by: FAMILY MEDICINE

## 2020-10-07 NOTE — PROGRESS NOTES
ANTICOAGULATION MANAGEMENT     Patient Name:  Linwood Chi  Date:  10/7/2020    ASSESSMENT /SUBJECTIVE:    Today's INR result of 3.3 is supratherapeutic. Goal INR of 2.0-3.0      Warfarin dose taken: Warfarin taken as instructed    Diet: No new diet changes affecting INR    Medication changes/ interactions: No new medications/supplements affecting INR    Previous INR: Supratherapeutic     S/S of bleeding or thromboembolism: No    New injury or illness: No    Upcoming surgery, procedure or cardioversion: No    Additional findings: Still having knee pain      PLAN:    Telephone call with Linwood regarding INR result and instructed:     Warfarin Dosing Instructions: hold tomorrow (already took today's dose) then change your warfarin dose to 1.25 mg Mon and 2.5 mg ROW    Instructed patient to follow up no later than: 2 weeks  Lab visit scheduled    Education provided: Target INR goal and significance of current INR result      Linwood verbalizes understanding and agrees to warfarin dosing plan.    Instructed to call the Anticoagulation Clinic for any changes, questions or concerns. (#[phone])        Layla Garcia RN      OBJECTIVE:  Recent labs: (last 7 days)     10/07/20  1024   INR 3.30*         No question data found.  Anticoagulation Summary  As of 10/7/2020    INR goal:  2.0-3.0   TTR:  78.6 % (1 y)   INR used for dosing:  3.30 (10/7/2020)   Warfarin maintenance plan:  1.25 mg (2.5 mg x 0.5) every Mon; 2.5 mg (2.5 mg x 1) all other days   Full warfarin instructions:  1.25 mg every Mon; 2.5 mg all other days   Weekly warfarin total:  16.25 mg   Plan last modified:  Layla Garcia, RN (10/7/2020)   Next INR check:  10/21/2020   Priority:  INR   Target end date:  Indefinite    Indications    Long-term (current) use of anticoagulants [Z79.01] [Z79.01]  Atrial fibrillation (HCC) [I48.91] [I48.91]  Chronic atrial fibrillation (H) [I48.20]             Anticoagulation Episode Summary     INR check location:       Preferred lab:      Send INR reminders to:  SANDOR LILLY    Comments:        Anticoagulation Care Providers     Provider Role Specialty Phone number    Sony Canchola MD [facility] [phone]

## 2020-10-14 ENCOUNTER — ANCILLARY PROCEDURE (OUTPATIENT)
Dept: MRI IMAGING | Facility: CLINIC | Age: 85
End: 2020-10-14
Attending: FAMILY MEDICINE
Payer: COMMERCIAL

## 2020-10-14 ENCOUNTER — ANCILLARY PROCEDURE (OUTPATIENT)
Dept: GENERAL RADIOLOGY | Facility: CLINIC | Age: 85
End: 2020-10-14
Attending: FAMILY MEDICINE
Payer: COMMERCIAL

## 2020-10-14 DIAGNOSIS — Z98.890 HISTORY OF METAL REMOVED FROM EYE: ICD-10-CM

## 2020-10-14 DIAGNOSIS — M50.30 DDD (DEGENERATIVE DISC DISEASE), CERVICAL: Primary | ICD-10-CM

## 2020-10-14 DIAGNOSIS — M48.062 SPINAL STENOSIS, LUMBAR REGION, WITH NEUROGENIC CLAUDICATION: ICD-10-CM

## 2020-10-14 DIAGNOSIS — M51.369 DDD (DEGENERATIVE DISC DISEASE), LUMBAR: ICD-10-CM

## 2020-10-14 DIAGNOSIS — M54.10 RADICULAR PAIN OF LOWER EXTREMITY: ICD-10-CM

## 2020-10-14 PROCEDURE — 72148 MRI LUMBAR SPINE W/O DYE: CPT | Mod: TC

## 2020-10-14 PROCEDURE — 70030 X-RAY EYE FOR FOREIGN BODY: CPT | Mod: TC

## 2020-10-16 ENCOUNTER — ANCILLARY PROCEDURE (OUTPATIENT)
Dept: CT IMAGING | Facility: CLINIC | Age: 85
End: 2020-10-16
Attending: FAMILY MEDICINE
Payer: COMMERCIAL

## 2020-10-16 DIAGNOSIS — M50.30 DDD (DEGENERATIVE DISC DISEASE), CERVICAL: ICD-10-CM

## 2020-10-16 DIAGNOSIS — M48.062 SPINAL STENOSIS, LUMBAR REGION, WITH NEUROGENIC CLAUDICATION: ICD-10-CM

## 2020-10-16 PROCEDURE — 72131 CT LUMBAR SPINE W/O DYE: CPT | Mod: TC

## 2020-10-20 ENCOUNTER — TELEPHONE (OUTPATIENT)
Dept: FAMILY MEDICINE | Facility: CLINIC | Age: 85
End: 2020-10-20

## 2020-10-20 DIAGNOSIS — S32.009A CLOSED FRACTURE OF TRANSVERSE PROCESS OF LUMBAR VERTEBRA, INITIAL ENCOUNTER (H): Primary | ICD-10-CM

## 2020-10-20 DIAGNOSIS — M54.50 ACUTE LOW BACK PAIN, UNSPECIFIED BACK PAIN LATERALITY, UNSPECIFIED WHETHER SCIATICA PRESENT: ICD-10-CM

## 2020-10-20 NOTE — TELEPHONE ENCOUNTER
Reason for call:  Results   Name of test or procedure: Xray  Date of test or procedure: fridley  Location of test or procedure: 10/14/20    Additional comments: na    Phone number to reach patient:  Home number on file [phone] (home)    Best Time:  any    Can we leave a detailed message on this number?  YES    Travel screening: Not Applicable

## 2020-10-20 NOTE — TELEPHONE ENCOUNTER
Called patient and left a voicemail; the CT scan showed a small fracture on L5 and I am he wants you to see a spine specialist.  Call back and let me know then I can put in a referral

## 2020-10-21 ENCOUNTER — ANTICOAGULATION THERAPY VISIT (OUTPATIENT)
Dept: FAMILY MEDICINE | Facility: CLINIC | Age: 85
End: 2020-10-21

## 2020-10-21 DIAGNOSIS — I48.20 CHRONIC ATRIAL FIBRILLATION (H): ICD-10-CM

## 2020-10-21 DIAGNOSIS — Z79.01 LONG TERM CURRENT USE OF ANTICOAGULANT THERAPY: ICD-10-CM

## 2020-10-21 DIAGNOSIS — I48.91 ATRIAL FIBRILLATION (H): ICD-10-CM

## 2020-10-21 LAB
CAPILLARY BLOOD COLLECTION: NORMAL
INR PPP: 2.2 (ref 0.86–1.14)

## 2020-10-21 PROCEDURE — 36416 COLLJ CAPILLARY BLOOD SPEC: CPT | Performed by: FAMILY MEDICINE

## 2020-10-21 PROCEDURE — 85610 PROTHROMBIN TIME: CPT | Performed by: FAMILY MEDICINE

## 2020-10-21 NOTE — PROGRESS NOTES
ANTICOAGULATION MANAGEMENT     Patient Name:  Linwood Chi  Date:  10/21/2020    ASSESSMENT /SUBJECTIVE:    Today's INR result of 2.2 is therapeutic. Goal INR of 2.0-3.0      Warfarin dose taken: Less warfarin taken than planned which may be affecting INR, took a full tab on Mon instead of 1/2 tab.    Diet: No new diet changes affecting INR    Medication changes/ interactions: No new medications/supplements affecting INR    Previous INR: Supratherapeutic     S/S of bleeding or thromboembolism: No    New injury or illness: No    Upcoming surgery, procedure or cardioversion: No    Additional findings: None      PLAN:    Telephone call with Linwood regarding INR result and instructed:     Warfarin Dosing Instructions: Continue your current warfarin dose 1.25mg every Mon; 2.5mg all other days of the week.     Instructed patient to follow up no later than: 3 weeks  Lab visit scheduled    Education provided: Target INR goal and significance of current INR result and Contact the anticoagulation clinic with any changes, questions or concerns at #[phone]       Linwood verbalizes understanding and agrees to warfarin dosing plan.    Instructed to call the Anticoagulation Clinic for any changes, questions or concerns. (#[phone])        Diaz Martínez RN      OBJECTIVE:  Recent labs: (last 7 days)     10/21/20  1043   INR 2.20*         No question data found.  Anticoagulation Summary  As of 10/21/2020    INR goal:  2.0-3.0   TTR:  77.5 % (1 y)   INR used for dosing:     Warfarin maintenance plan:  1.25 mg (2.5 mg x 0.5) every Mon; 2.5 mg (2.5 mg x 1) all other days   Full warfarin instructions:  1.25 mg every Mon; 2.5 mg all other days   Weekly warfarin total:  16.25 mg   No change documented:  Tanya, Diaz, RN   Plan last modified:  Layla Garcia RN (10/7/2020)   Next INR check:  11/11/2020   Priority:  Maintenance   Target end date:  Indefinite    Indications    Long-term (current) use of anticoagulants [Z79.01]  [Z79.01]  Atrial fibrillation (HCC) [I48.91] [I48.91]  Chronic atrial fibrillation (H) [I48.20]             Anticoagulation Episode Summary     INR check location:      Preferred lab:      Send INR reminders to:  SANDOR LILLY    Comments:        Anticoagulation Care Providers     Provider Role Specialty Phone number    Sony Canchola MD [facility] [phone]

## 2020-10-23 NOTE — TELEPHONE ENCOUNTER
Called patient and notified him of reply from provider. He verbalized understanding and took referral scheduling number.

## 2020-10-23 NOTE — TELEPHONE ENCOUNTER
Called patient , he states that he did receive the voice message regarding the results. He states that he would be okay with the referral to the spine specialist if this will help to make him feel better.

## 2020-10-27 ENCOUNTER — TELEPHONE (OUTPATIENT)
Dept: AUDIOLOGY | Facility: CLINIC | Age: 85
End: 2020-10-27

## 2020-10-27 NOTE — TELEPHONE ENCOUNTER
Called patient back and told him that we are not able to send results to another clinic with a release, and that the easiest way to get the results sent over would be to call our medical records department at [phone].    Tommy Venegas, CCC-A  [place] Licensed Audiologist #[id number]  10/27/2020

## 2020-10-27 NOTE — TELEPHONE ENCOUNTER
Reason for Call:  Other call back    Detailed comments: Patient wants hearing screening sent to [facility] [place], [place].    Phone Number Patient can be reached at: Home number on file [phone] (home)    Best Time: any    Can we leave a detailed message on this number? YES    Call taken on 10/27/2020 at 1:04 PM by Annel Oquendo

## 2020-10-29 ENCOUNTER — OFFICE VISIT (OUTPATIENT)
Dept: NEUROSURGERY | Facility: CLINIC | Age: 85
End: 2020-10-29
Attending: FAMILY MEDICINE
Payer: COMMERCIAL

## 2020-10-29 VITALS
HEIGHT: 64 IN | BODY MASS INDEX: 28.17 KG/M2 | HEART RATE: 71 BPM | SYSTOLIC BLOOD PRESSURE: 198 MMHG | OXYGEN SATURATION: 98 % | WEIGHT: 165 LBS | DIASTOLIC BLOOD PRESSURE: 89 MMHG

## 2020-10-29 DIAGNOSIS — M54.50 ACUTE LOW BACK PAIN, UNSPECIFIED BACK PAIN LATERALITY, UNSPECIFIED WHETHER SCIATICA PRESENT: ICD-10-CM

## 2020-10-29 DIAGNOSIS — S32.009A CLOSED FRACTURE OF TRANSVERSE PROCESS OF LUMBAR VERTEBRA, INITIAL ENCOUNTER (H): ICD-10-CM

## 2020-10-29 DIAGNOSIS — M54.16 LUMBAR RADICULOPATHY: Primary | ICD-10-CM

## 2020-10-29 PROCEDURE — 99213 OFFICE O/P EST LOW 20 MIN: CPT | Performed by: NURSE PRACTITIONER

## 2020-10-29 ASSESSMENT — MIFFLIN-ST. JEOR: SCORE: 1319.44

## 2020-10-29 NOTE — LETTER
"    10/29/2020         RE: Linwood Chi  [address] [address] [address]        Dear Colleague,    Thank you for referring your patient, Linwood Chi, to the [facility]. Please see a copy of my visit note below.    Dr. Dawson Jose  [facility] Neurosurgery Clinic Visit      CC: low back pain    Primary care Provider: Sony Canchola    Reason For Visit:   I was asked by Dr. Canchola to consult on the patient for acute low back pain with recent nondisplaced fracture through left L5 transverse process.       HPI: Linwood Chi is a 89 year old male with history of T12 vertebroplasty in 2018 who presents for evaluation of back and left leg pain. He reports his pain initially improved after surgery, but then increased again 6 months ago.  Pain is located in the low back and radiates down \"entire\" left leg to toes, along with numbness/tingling in this pattern. He walks with a walker because he feels weak in his legs. Describes the pain as burning. Pain is worsened with walking. Pain is alleviated with sitting. Tried Tylenol and home therapies, but these modalities do not provide lasting relief. Denies any falls, foot drop, saddle anesthesia, or bladder/bowel incontinence.     Current pain: 5/10   At worst: 10/10    Past Medical History:   Diagnosis Date     CAD (coronary artery disease)      History of tobacco use     Smoked x 50 years, Quit 1996     Hyperlipidemia LDL goal < 100      Hypertension goal BP (blood pressure) < 130/80      Rectal bleeding 10/18/2011    Admit[facility].     Type 2 diabetes, HbA1c goal < 7% (H)        Past Medical History reviewed with patient during visit.    Past Surgical History:   Procedure Laterality Date     BYPASS GRAFT ARTERY CORONARY  2004    3 vessel 2004     left eye cataract Left      OPEN REDUCTION INTERNAL FIXATION PATELLA       Past Surgical History reviewed with patient during visit.    Current " Outpatient Medications   Medication     acetaminophen (TYLENOL) 500 MG tablet     amLODIPine (NORVASC) 5 MG tablet     atorvastatin (LIPITOR) 40 MG tablet     dorzolamide-timolol (COSOPT) 2-0.5 % ophthalmic solution     FLUAD 0.5 ML YOGI     FLUZONE HIGH-DOSE QUADRIVALENT 0.7 ML YOGI injection     latanoprost (XALATAN) 0.005 % ophthalmic solution     lidocaine (XYLOCAINE) 2 % external gel     lisinopril (PRINIVIL/ZESTRIL) 20 MG tablet     metFORMIN (GLUCOPHAGE) 500 MG tablet     metoprolol tartrate (LOPRESSOR) 25 MG tablet     omeprazole (PRILOSEC) 20 MG DR capsule     order for DME     warfarin ANTICOAGULANT (COUMADIN) 2.5 MG tablet     No current facility-administered medications for this visit.        Allergies   Allergen Reactions     Crestor [Rosuvastatin] Cramps     Penicillins      Swelling        Social History     Socioeconomic History     Marital status:      Spouse name: Not on file     Number of children: Not on file     Years of education: Not on file     Highest education level: Not on file   Occupational History     Not on file   Social Needs     Financial resource strain: Not on file     Food insecurity     Worry: Not on file     Inability: Not on file     Transportation needs     Medical: Not on file     Non-medical: Not on file   Tobacco Use     Smoking status: Former Smoker     Years: 50.00     Types: Cigarettes     Quit date: 3/4/1992     Years since quittin.6     Smokeless tobacco: Former User   Substance and Sexual Activity     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      Drug use: No     Sexual activity: Never   Lifestyle     Physical activity     Days per week: Not on file     Minutes per session: Not on file     Stress: Not on file   Relationships     Social connections     Talks on phone: Not on file     Gets together: Not on file     Attends Congregational service: Not on file     Active member of club or organization: Not on file     Attends meetings of clubs or organizations:  "Not on file     Relationship status: Not on file     Intimate partner violence     Fear of current or ex partner: Not on file     Emotionally abused: Not on file     Physically abused: Not on file     Forced sexual activity: Not on file   Other Topics Concern     Parent/sibling w/ CABG, MI or angioplasty before 65F 55M? Not Asked   Social History Narrative     Not on file       Family History   Problem Relation Age of Onset     Asthma Father      Diabetes Paternal Grandmother      Cerebrovascular Disease Brother      Hypertension Brother      Diabetes Sister      Psychotic Disorder Sister          ROS: 10 point ROS neg other than the symptoms noted above in the HPI.    Vital Signs: BP (!) 198/89   Pulse 71   Ht 5' 4\" (1.626 m)   Wt 165 lb (74.8 kg)   SpO2 98%   BMI 28.32 kg/m      Examination:  Constitutional:  Alert, well nourished, NAD.  HEENT: Normocephalic, atraumatic.   Pulmonary:  Without shortness of breath, normal effort.   Lymph: No lymphadenopathy to low back or LE.   Integumentary: Skin is free of rashes or lesions.   Cardiovascular:  No pitting edema of BLE.      Neurological:  Awake  Alert  Oriented x 3  Speech clear  Cranial nerves II - XII grossly intact  PERRL  EOMI  Face symmetric  Tongue midline  Motor exam   Hip Flexor:                 Right: 5/5  Left:  5/5  Quadriceps:               Right:  5/5  Left:  5/5  Hamstrings:               Right:  5/5  Left:  5/5  Gastroc Soleus:         Right:  5/5  Left:  5/5  Tib/Ant:                      Right:  5/5  Left:  5/5  EHL:                          Right:  5/5  Left:  5/5         Sensation normal to bilateral upper and lower extremities.    Reflexes are 2+ in the patellar and Achilles. There is no clonus. Downgoing Babinski.      Musculoskeletal:  Gait: Able to stand from a seated position. Ambulates with walker.    Lumbar examination reveals no tenderness of the spine or paraspinous muscles.  Hip height is symmetrical. Negative SI joint, sciatic " "notch or greater trochanteric tenderness to palpation bilaterally.  Straight leg raise is negative bilaterally.      Imaging:   CT of the lumbar spine from 10/16/20 was reviewed in the office today.   IMPRESSION:  1. Recent fracture nondisplaced through the left L5 transverse process extending slightly into the left lateral L5 vertebral body.  2. Small right-sided L5 lesion not appreciated on the CT hence, most likely benign.  3. Grade 1 spondylitic spondylolisthesis at L5-S1 with moderate-to-severe left-sided foraminal stenosis.  4. L5 degenerative disc and facet disease with secondary moderate-to-severe bilateral neural foraminal stenosis.  5. Previous vertebroplasty for an old T12 burst fracture.    MRI of the  Lumbar spine from 10/14/20 was reviewed in the office today. Reveals severe left foraminal narrowing at L5-S1 secondary to left disc extrusion.     Assessment/Plan:   Linwood Chi is a 89 year old male with history of T12 vertebroplasty in 2018 who presents for evaluation of back and left leg pain. He reports his pain initially improved after surgery, but then increased again 6 months ago. Pain is located in the low back and radiates down \"entire\" left leg to toes, along with numbness/tingling in this pattern. He walks with a walker because he feels weak in his legs. He is neurologically intact on exam. Imaging reviewed in clinic today with Dr. Jose. No need for bracing at this time, but would recommend a lumbar epidural steroid injection. Patient voiced understanding and agreement with this plan. Advised patient to call our clinic if symptoms persist, change, or worsen.      Stephanie Izquierdo, LELAND  [facility] Neurosurgery  [address] [address]  Tel [phone]  Pager [phone]        Again, thank you for allowing me to participate in the care of your patient.        Sincerely,        Stephanie Izquierdo, NP    "

## 2020-10-29 NOTE — NURSING NOTE
"October 29, 2020 11:52 AM   Linwood Chi is a 89 year old male who presents for:    Chief Complaint   Patient presents with     RECHECK     Fracture     Initial Vitals: BP (!) 198/89   Pulse 71   Ht 5' 4\" (1.626 m)   Wt 165 lb (74.8 kg)   SpO2 98%   BMI 28.32 kg/m   Estimated body mass index is 28.32 kg/m  as calculated from the following:    Height as of this encounter: 5' 4\" (1.626 m).    Weight as of this encounter: 165 lb (74.8 kg). Body surface area is 1.84 meters squared.  Data Unavailable Comment: Data Unavailable     Pharmacy name entered into [employer]:    [id number] - [place], [place] - [address] [address] [facility] MA[facility] ORDER    Clinical concerns: Linwood Chi is here today for follow up on a fracture.  Melissa Brown MA  "

## 2020-10-29 NOTE — PROGRESS NOTES
"Dr. Dawson Jose  [facility] Visit      CC: low back pain    Primary care Provider: Sony Canchola    Reason For Visit:   I was asked by Dr. Canchola to consult on the patient for acute low back pain with recent nondisplaced fracture through left L5 transverse process.       HPI: Linwood Chi is a 89 year old male with history of T12 vertebroplasty in 2018 who presents for evaluation of back and left leg pain. He reports his pain initially improved after surgery, but then increased again 6 months ago.  Pain is located in the low back and radiates down \"entire\" left leg to toes, along with numbness/tingling in this pattern. He walks with a walker because he feels weak in his legs. Describes the pain as burning. Pain is worsened with walking. Pain is alleviated with sitting. Tried Tylenol and home therapies, but these modalities do not provide lasting relief. Denies any falls, foot drop, saddle anesthesia, or bladder/bowel incontinence.     Current pain: 5/10   At worst: 10/10    Past Medical History:   Diagnosis Date     CAD (coronary artery disease)      History of tobacco use     Smoked x 50 years, Quit 1996     Hyperlipidemia LDL goal < 100      Hypertension goal BP (blood pressure) < 130/80      Rectal bleeding 10/18/2011    Admit-[facility].     Type 2 diabetes, HbA1c goal < 7% (H)        Past Medical History reviewed with patient during visit.    Past Surgical History:   Procedure Laterality Date     BYPASS GRAFT ARTERY CORONARY  2004    3 vessel 2004     left eye cataract Left      OPEN REDUCTION INTERNAL FIXATION PATELLA       Past Surgical History reviewed with patient during visit.    Current Outpatient Medications   Medication     acetaminophen (TYLENOL) 500 MG tablet     amLODIPine (NORVASC) 5 MG tablet     atorvastatin (LIPITOR) 40 MG tablet     dorzolamide-timolol (COSOPT) 2-0.5 % ophthalmic solution     FLUAD 0.5 ML YOGI     FLUZONE HIGH-DOSE QUADRIVALENT 0.7 ML " YOGI injection     latanoprost (XALATAN) 0.005 % ophthalmic solution     lidocaine (XYLOCAINE) 2 % external gel     lisinopril (PRINIVIL/ZESTRIL) 20 MG tablet     metFORMIN (GLUCOPHAGE) 500 MG tablet     metoprolol tartrate (LOPRESSOR) 25 MG tablet     omeprazole (PRILOSEC) 20 MG DR capsule     order for DME     warfarin ANTICOAGULANT (COUMADIN) 2.5 MG tablet     No current facility-administered medications for this visit.        Allergies   Allergen Reactions     Crestor [Rosuvastatin] Cramps     Penicillins      Swelling        Social History     Socioeconomic History     Marital status:      Spouse name: Not on file     Number of children: Not on file     Years of education: Not on file     Highest education level: Not on file   Occupational History     Not on file   Social Needs     Financial resource strain: Not on file     Food insecurity     Worry: Not on file     Inability: Not on file     Transportation needs     Medical: Not on file     Non-medical: Not on file   Tobacco Use     Smoking status: Former Smoker     Years: 50.00     Types: Cigarettes     Quit date: 3/4/1992     Years since quittin.6     Smokeless tobacco: Former User   Substance and Sexual Activity     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      Drug use: No     Sexual activity: Never   Lifestyle     Physical activity     Days per week: Not on file     Minutes per session: Not on file     Stress: Not on file   Relationships     Social connections     Talks on phone: Not on file     Gets together: Not on file     Attends Judaism service: Not on file     Active member of club or organization: Not on file     Attends meetings of clubs or organizations: Not on file     Relationship status: Not on file     Intimate partner violence     Fear of current or ex partner: Not on file     Emotionally abused: Not on file     Physically abused: Not on file     Forced sexual activity: Not on file   Other Topics Concern     Parent/sibling  "w/ CABG, MI or angioplasty before 65F 55M? Not Asked   Social History Narrative     Not on file       Family History   Problem Relation Age of Onset     Asthma Father      Diabetes Paternal Grandmother      Cerebrovascular Disease Brother      Hypertension Brother      Diabetes Sister      Psychotic Disorder Sister          ROS: 10 point ROS neg other than the symptoms noted above in the HPI.    Vital Signs: BP (!) 198/89   Pulse 71   Ht 5' 4\" (1.626 m)   Wt 165 lb (74.8 kg)   SpO2 98%   BMI 28.32 kg/m      Examination:  Constitutional:  Alert, well nourished, NAD.  HEENT: Normocephalic, atraumatic.   Pulmonary:  Without shortness of breath, normal effort.   Lymph: No lymphadenopathy to low back or LE.   Integumentary: Skin is free of rashes or lesions.   Cardiovascular:  No pitting edema of BLE.      Neurological:  Awake  Alert  Oriented x 3  Speech clear  Cranial nerves II - XII grossly intact  PERRL  EOMI  Face symmetric  Tongue midline  Motor exam   Hip Flexor:                 Right: 5/5  Left:  5/5  Quadriceps:               Right:  5/5  Left:  5/5  Hamstrings:               Right:  5/5  Left:  5/5  Gastroc Soleus:         Right:  5/5  Left:  5/5  Tib/Ant:                      Right:  5/5  Left:  5/5  EHL:                          Right:  5/5  Left:  5/5         Sensation normal to bilateral upper and lower extremities.    Reflexes are 2+ in the patellar and Achilles. There is no clonus. Downgoing Babinski.      Musculoskeletal:  Gait: Able to stand from a seated position. Ambulates with walker.    Lumbar examination reveals no tenderness of the spine or paraspinous muscles.  Hip height is symmetrical. Negative SI joint, sciatic notch or greater trochanteric tenderness to palpation bilaterally.  Straight leg raise is negative bilaterally.      Imaging:   CT of the lumbar spine from 10/16/20 was reviewed in the office today.   IMPRESSION:  1. Recent fracture nondisplaced through the left L5 transverse " "process extending slightly into the left lateral L5 vertebral body.  2. Small right-sided L5 lesion not appreciated on the CT hence, most likely benign.  3. Grade 1 spondylitic spondylolisthesis at L5-S1 with moderate-to-severe left-sided foraminal stenosis.  4. L5 degenerative disc and facet disease with secondary moderate-to-severe bilateral neural foraminal stenosis.  5. Previous vertebroplasty for an old T12 burst fracture.    MRI of the  Lumbar spine from 10/14/20 was reviewed in the office today. Reveals severe left foraminal narrowing at L5-S1 secondary to left disc extrusion.     Assessment/Plan:   Linwood Chi is a 89 year old male with history of T12 vertebroplasty in 2018 who presents for evaluation of back and left leg pain. He reports his pain initially improved after surgery, but then increased again 6 months ago. Pain is located in the low back and radiates down \"entire\" left leg to toes, along with numbness/tingling in this pattern. He walks with a walker because he feels weak in his legs. He is neurologically intact on exam. Imaging reviewed in clinic today with Dr. Jose. No need for bracing at this time, but would recommend a lumbar epidural steroid injection. Patient voiced understanding and agreement with this plan. Advised patient to call our clinic if symptoms persist, change, or worsen.      Stephanie Izquierdo CNP  [facility]  [address] [address]  Tel [phone]  Pager [phone]    "

## 2020-10-30 ENCOUNTER — TELEPHONE (OUTPATIENT)
Dept: PALLIATIVE MEDICINE | Facility: CLINIC | Age: 85
End: 2020-10-30

## 2020-10-30 DIAGNOSIS — I48.91 ATRIAL FIBRILLATION, UNSPECIFIED TYPE (H): Primary | ICD-10-CM

## 2020-10-30 NOTE — TELEPHONE ENCOUNTER
PA pending additional information - please specify exact  laterality and approach of injection.      Maggie GALAN    [facility]

## 2020-10-30 NOTE — TELEPHONE ENCOUNTER
SUBJECTIVE:                                                    Vy Zahng is a 13 year old female who presents to clinic today with mother because of:    Chief Complaint   Patient presents with     Pharyngitis     Since Tuesday , headaches, low grade fever.         HPI:  ENT/Cough Symptoms    Problem started: 3 days ago  Fever: YES  Runny nose: no  Congestion: no  Sore Throat: YES  Cough: no  Eye discharge/redness:  no  Ear Pain: YES, Both ears  Wheeze: no   Sick contacts: None;  Strep exposure: None;  Therapies Tried: none      ROS:  Review of Systems   Constitutional: Negative for chills, fever and malaise/fatigue.   HENT: Positive for sore throat. Negative for congestion and ear pain.    Eyes: Negative for blurred vision, discharge and redness.   Respiratory: Negative for cough, shortness of breath and wheezing.    Cardiovascular: Negative for chest pain and palpitations.   Gastrointestinal: Negative for abdominal pain, diarrhea, nausea and vomiting.   Musculoskeletal: Negative for joint pain and myalgias.   Skin: Negative for rash.   Neurological: Negative for headaches.         PROBLEM LIST:  Patient Active Problem List    Diagnosis Date Noted     Keratosis pilaris 08/21/2015     Priority: Medium      MEDICATIONS:  Current Outpatient Prescriptions   Medication Sig Dispense Refill     ibuprofen (ADVIL,MOTRIN) 100 MG/5ML suspension Take 11 mg/kg by mouth every 4 hours as needed for fever or moderate pain       NO ACTIVE MEDICATIONS .        ALLERGIES:  Allergies   Allergen Reactions     Sulfa Drugs Swelling     Facial swelling form sulfa drops       Problem list and histories reviewed & adjusted, as indicated.    OBJECTIVE:                                                      /78  Pulse 77  Temp 98.8  F (37.1  C) (Oral)  Wt 118 lb (53.5 kg)  SpO2 97%   No height on file for this encounter.    Physical Exam   Constitutional: She is well-developed, well-nourished, and in no distress.   HENT:   Head:  Caudal RODRICK    Kerri Suazo MD    Normocephalic.   Right Ear: Tympanic membrane and ear canal normal.   Left Ear: Tympanic membrane and ear canal normal.   Mouth/Throat: Oropharynx is clear and moist.   A few ulcer type lesions around uvula; otherwise clear   Eyes: Conjunctivae are normal. Pupils are equal, round, and reactive to light.   Cardiovascular: Normal rate, regular rhythm and normal heart sounds.    Pulmonary/Chest: Effort normal and breath sounds normal.   Skin: No rash noted.   Psychiatric:   Alert and cooperative       DIAGNOSTICS: Rapid strep Ag:  negative    ASSESSMENT/PLAN:                                                      1. Viral upper respiratory tract infection  This is likely viral. Continue with supportive care. Get plenty of rest and push fluids. Can use Tylenol and/or ibuprofen as needed for pain and/or fever control. Allow 7-10 days for symptoms to improve. Follow up as needed.      2. Sorethroat    - Strep, Rapid Screen  - Beta strep group A culture       FOLLOW UP: If not improving or if worsening    Fanny Sheldon PA-C

## 2020-10-30 NOTE — TELEPHONE ENCOUNTER
"Screening Questions for Radiology Injections:    Injection to be done at which interventional clinic site? [facility] [place]    If [facility] location, tell patient that this procedure requires a COVID-19 lab test be done within 4 days of the procedure. Would you still like to move forward with scheduling the procedure?  Not Applicable   If YES, let patient know that someone will call them to schedule the COVID-19 test and that they will only receive a call back if the result is positive. Route to nursing to enter order.     Instruct patient to arrive as directed prior to the scheduled appointment time:    [place] minutes before      [place]: 30 minutes before; if IV needed 1 hour before     Dr. Daniel-no IV needed for Cervical RODRICK; please instruct to arrive 30\" early    Procedure ordered by [place]    Procedure ordered? L5-S1 RODRICK    As a reminder, receiving steroids can decrease your body's ability to fight infection.   Would you still like to move forward with scheduling the injection?  Yes      Transforaminal Cervical RODRICK - no pain provider currently performing    What insurance would patient like us to bill for this procedure? [employer]/ Medicare      Worker's comp or MVA (motor vehicle accident) -Any injection DO NOT SCHEDULE and route to Maggie Buitrago.      [employer] insurance - For SI joint injections, DO NOT SCHEDULE and route Maggie Buitrago.       [employer] - Any injection besides hip/shoulder/knee joint DO NOT SCHEDULE and route to Maggie Buitrago. She will obtain PA and call pt back to schedule procedure or notify pt of denial.       [employer]-Route to [place] for review      **BCBS- ALL need to be routed to [place] for review if a PA is needed**      IF SCHEDULING IN [place] AND NEEDS A PA, IT IS OKAY TO SCHEDULE. [place] HANDLES THEIR OWN PA'S AFTER THE PATIENT IS SCHEDULED. PLEASE SCHEDULE AT LEAST 1 WEEK OUT SO A PA CAN BE OBTAINED.    Any chance of pregnancy? Not Applicable "   If YES, do NOT schedule and route to RN pool    Is an  needed? No     Patient has a drive home? (mandatory) YES: Informed    Is patient taking any blood thinners (i.e. plavix, coumadin, jantoven, warfarin, heparin, pradaxa or dabigatran, etc)? Yes - Warfarin  If hold needed, do NOT schedule, route to RN pool     Is patient taking any aspirin products (includes Excedrin and Fiorinal)? No     If more than 325mg/day, OK to schedule; Instruct pt to decrease to less than 325 mg for 7 days AND route to RN pool    For CERVICAL procedures, hold all aspirin products for 6 days.     Tell pt that if aspirin product is not held for 6 days, the procedure WILL BE cancelled.      Does the patient have a bleeding or clotting disorder? No     If YES, okay to schedule AND route to RN nurse pool    For any patients with platelet count <100, must be forwarded to provider    Is patient diabetic?  Yes  If YES, instruct them to bring their glucometer.    Does patient have an active infection or treated for one within the past week? No     Is patient currently taking any antibiotics?  No     For patients on chronic, preventative, or prophylactic antibiotics, procedures may be scheduled.     For patients on antibiotics for active or recent infection:antibiotic course must have been completed for 4 days    Is patient currently taking any steroid medications? (i.e. Prednisone, Medrol)  No     For patients on steroid medications, course must have been completed for 4 days    Is patient actively being treated for cancer or immunocompromised? No  If YES, do NOT schedule and route to RN pool     Are you able to get on and off an exam table with minimal or no assistance? Yes  If NO, do NOT schedule and route to RN pool    Are you able to roll over and lay on your stomach with minimal or no assistance? Yes  If NO, do NOT schedule and route to RN pool     Any allergies to contrast dye, iodine, shellfish, or numbing and steroid  medications? No  If YES, route to RN pool AND add allergy information to appointment notes    Allergies: Crestor [rosuvastatin] and Penicillins      Has the patient had a flu shot or any other vaccinations within 7 days before or after the procedure.  No     Does patient have an MRI/CT?  YES: 2020  Check Procedure Scheduling Grid to see if required.      Was the MRI done within the last 3 years?  Yes    If yes, where was the MRI done i.e.[facility], [facility], [facility], [facility], [facility] etc? [facility]      If no, do not schedule and route to RN pool    If MRI was not done at [facility], [facility] or [facility] do NOT schedule and route to RN pool.      If pt has an imaging disc, the injection MAY be scheduled but pt has to bring disc to appt.     If they show up without the disc the injection cannot be done    Procedure Specific Instructions:      If celiac plexus block, informed patient NPO for 6 hours and that it is okay to take medications with sips of water, especially blood pressure medications  Not Applicable         If this is for a cervical procedure, informed patient that aspirin needs to be held for 6 days.   Not Applicable      If IV needed:    Do not schedule procedures requiring IV placement in the first appointment of the day or first appointment after lunch. Do NOT schedule at 0745, 0815 or 1245.     Instructed pt to arrive 30 minutes early for IV start if required. (Check Procedure Scheduling Grid)  Not Applicable    Reminders:      If you are started on any steroids or antibiotics between now and your appointment, you must contact us because the procedure may need to be cancelled.  Yes      For all procedures except radiofrequency ablations (RFAs) and spinal cord stimulator (SCS) trials, informed patient:    IV sedation is not provided for this procedure.  If you feel that an oral anti-anxiety medication is needed, you can discuss this further with your referring provider or primary care provider.  The  Pain Clinic provider will discuss specifics of what the procedure includes at your appointment.  Most procedures last 10-20 minutes.  We use numbing medications to help with any discomfort during the procedure.  Not Applicable      For patients 85 or older we recommend having an adult stay w/ them for the remainder of the day.       Does the patient have any questions?  NO  Patricia Saldivar  [facility]

## 2020-10-30 NOTE — TELEPHONE ENCOUNTER
[id number].      Auth No / Request ID   Status Auto-Approved  Decision Approved  Effective Date 10/30/2020  Expiration Date 12/14/2020  Decision Date 10/30/2020    JUAN FRANCISCO TO SCHEDULE WITH DR EMELYN GALAN    [facility]

## 2020-10-30 NOTE — TELEPHONE ENCOUNTER
Patient is requesting to schedule an injection with the [facility].     This would require the patient to hold:                 Coumadin (Warfarin)         Hold 5 days prior to procedure.  Check INR prior to procedure.  Ok to resume night of procedure, unless directed otherwise by provider or anticoagulation clinic.      We are requesting your approval to hold the medication for this time frame.    Please keep call open and route back to the PAIN NURSE [[id number]] pool.     If hold approved, we will contact the patient for scheduling.    Thank you.    Routed to Dr Jesika Brambila RN  Care Coordinator  [facility]

## 2020-11-02 NOTE — TELEPHONE ENCOUNTER
Called pt.     Injection scheduled for:  11/9/20  Coumadin hold starts on:  11/4/*20  Is lovenox bridging needed?  Not Applicable  INR order in?: YES  Lab appointment done?  YES  Injection intake questions reviewed?  YES  Infection/antibiotic information reviewed?  YES  Location confirmed: :Yes [facility]  Is pt arriving early?:Yes 1245  COVID test order in? N/A      Marta Chinchilla RN-BSN  [facility]

## 2020-11-07 DIAGNOSIS — E11.21 TYPE 2 DIABETES MELLITUS WITH DIABETIC NEPHROPATHY, WITHOUT LONG-TERM CURRENT USE OF INSULIN (H): ICD-10-CM

## 2020-11-07 DIAGNOSIS — K21.9 GASTROESOPHAGEAL REFLUX DISEASE WITHOUT ESOPHAGITIS: ICD-10-CM

## 2020-11-09 ENCOUNTER — ANCILLARY PROCEDURE (OUTPATIENT)
Dept: RADIOLOGY | Facility: CLINIC | Age: 85
End: 2020-11-09
Attending: PSYCHIATRY & NEUROLOGY
Payer: COMMERCIAL

## 2020-11-09 ENCOUNTER — ANTICOAGULATION THERAPY VISIT (OUTPATIENT)
Dept: FAMILY MEDICINE | Facility: CLINIC | Age: 85
End: 2020-11-09

## 2020-11-09 ENCOUNTER — RADIOLOGY INJECTION OFFICE VISIT (OUTPATIENT)
Dept: PALLIATIVE MEDICINE | Facility: CLINIC | Age: 85
End: 2020-11-09
Payer: COMMERCIAL

## 2020-11-09 VITALS
HEART RATE: 66 BPM | SYSTOLIC BLOOD PRESSURE: 160 MMHG | DIASTOLIC BLOOD PRESSURE: 88 MMHG | RESPIRATION RATE: 16 BRPM | OXYGEN SATURATION: 97 %

## 2020-11-09 DIAGNOSIS — M54.16 LUMBAR RADICULOPATHY: Primary | ICD-10-CM

## 2020-11-09 DIAGNOSIS — I48.20 CHRONIC ATRIAL FIBRILLATION (H): ICD-10-CM

## 2020-11-09 DIAGNOSIS — Z79.01 LONG TERM CURRENT USE OF ANTICOAGULANT THERAPY: ICD-10-CM

## 2020-11-09 DIAGNOSIS — I48.91 ATRIAL FIBRILLATION, UNSPECIFIED TYPE (H): ICD-10-CM

## 2020-11-09 DIAGNOSIS — M54.16 LUMBAR RADICULOPATHY: ICD-10-CM

## 2020-11-09 DIAGNOSIS — I48.91 ATRIAL FIBRILLATION (H): ICD-10-CM

## 2020-11-09 LAB
CAPILLARY BLOOD COLLECTION: NORMAL
INR PPP: 1.1 (ref 0.86–1.14)

## 2020-11-09 PROCEDURE — 85610 PROTHROMBIN TIME: CPT | Performed by: PSYCHIATRY & NEUROLOGY

## 2020-11-09 PROCEDURE — 62323 NJX INTERLAMINAR LMBR/SAC: CPT | Performed by: PSYCHIATRY & NEUROLOGY

## 2020-11-09 PROCEDURE — 99207 PR NO CHARGE NURSE ONLY: CPT | Performed by: FAMILY MEDICINE

## 2020-11-09 PROCEDURE — 36416 COLLJ CAPILLARY BLOOD SPEC: CPT | Performed by: PSYCHIATRY & NEUROLOGY

## 2020-11-09 ASSESSMENT — PAIN SCALES - GENERAL
PAINLEVEL: MODERATE PAIN (5)
PAINLEVEL: MODERATE PAIN (5)

## 2020-11-09 NOTE — PATIENT INSTRUCTIONS
[facility]   Procedure Discharge Instructions    Today you saw: Dr. Nichole Lanier     You had an:  Lumbar Epidural steroid injection       Medications used:  Lidocaine   Dexamethasone Omnipaque  Ropivicaine                If you were holding your blood thinning medication, please restart taking it: Restart your coumadin tomorrow or as directed by your INR clinic.  Please follow closely with your INR clinic for dosing and monitoring.     Be cautious when walking. Numbness and/or weakness in the lower extremities may occur for up to 6-8 hours after the procedure due to effect of the local anesthetic    Do not drive for 6 hours. The effect of the local anesthetic could slow your reflexes.     You may resume your regular activities after 24 hours    Avoid strenuous activity for the first 24 hours    You may shower, however avoid swimming, tub baths or hot tubs for 24 hours following your procedure    You may have a mild to moderate increase in pain for several days following the injection.    It may take up to 14 days for the steroid medication to start working although you may feel the effect as early as a few days after the procedure.       You may use ice packs for 10-15 minutes, 3 to 4 times a day at the injection site for comfort    Do not use heat to painful areas for 6 to 8 hours. This will give the local anesthetic time to wear off and prevent you from accidentally burning your skin.     Unless you have been directed to avoid the use of anti-inflammatory medications (NSAIDS), you may use medications such as ibuprofen, Aleve or Tylenol for pain control if needed.     If you have diabetes, check your blood sugar more frequently than usual as your blood sugar may be higher than normal for 10-14 days following a steroid injection. Contact your doctor who manages your diabetes if your blood sugar is higher than usual    Possible side effects of steroids that you may experience include  flushing, elevated blood pressure, increased appetite, mild headaches and restlessness.  All of these symptoms will get better with time.    If you experience any of the following, call the Pain Clinic during work hours (Mon-Friday 8-4:30 pm) at [phone] or the Provider Line after hours at [phone]:  -Fever over 100 degree F  -Swelling, bleeding, redness, drainage, warmth at the injection site  -Progressive weakness or numbness in your legs   -Loss of bowel or bladder function  -Unusual new onset of pain that is not improving

## 2020-11-09 NOTE — NURSING NOTE
Pre-procedure Intake    Have you been fasting? NA    If yes, for how long? N/A    Are you taking a prescribed blood thinner such as coumadin, Plavix, Xarelto?    Yes -   Coumadin    If yes, when did you take your last dose? 11/02/2020    Do you take aspirin?  No    If cervical procedure, have you held aspirin for 6 days?   NA    Do you have any allergies to contrast dye, iodine, steroid and/or numbing medications?  NO    Are you currently taking antibiotics or have an active infection?  NO    Have you had a fever/elevated temperature within the past week? NO    Are you currently taking oral steroids? NO    Do you have a ? Yes       Are you pregnant or breastfeeding?  NO    Are the vital signs normal?  Yes    Scott Stone MA

## 2020-11-09 NOTE — NURSING NOTE
Discharge Information    IV Discontiued Time:  NA    Amount of Fluid Infused:  NA    Discharge Criteria = When patient returns to baseline or as per MD order    Consciousness:  Pt is fully awake    Circulation:  BP +/- 20% of pre-procedure level    Respiration:  Patient is able to breathe deeply    O2 Sat:  Patient is able to maintain O2 Sat >92% on room air    Activity:  Moves 4 extremities on command    Ambulation:  Patient is able to stand and walk or stand and pivot into wheelchair    Dressing:  Clean/dry or No Dressing    Notes:   Discharge instructions and AVS given to patient    Patient meets criteria for discharge?  YES    Admitted to PCU?  No    Responsible adult present to accompany patient home?  Yes    Signature/Title:    Murtaza Lawson RN  RN Care Coordinator  [facility]

## 2020-11-09 NOTE — PROGRESS NOTES
Pre procedure Diagnosis: lumbar    Post procedure Diagnosis: Same  Procedure performed: L5-S1 interlaminar epidural steroid injection   Anesthesia: none  Complications: none  Operators: Chasity Lanier MD ; Eliel Samaniego DO (fellow)    Indications:   Linwood Chi is a 89 year old male was sent by Stephanie Izquierdo NP for acute on chronic low back pain.  They have a history of nondisplaced fracture through L5 transverse process as well as T12 vertebroplasty, with low back and leg leg radicular pain.  Exam shows positive straight leg raise on the left and they have tried conservative treatment including ice, rest.    MRI was done on 10/14/20 which showed   IMPRESSION:    1. Abnormal edema centered within the left L5 pedicle with possible fracture line in that area. Evaluation for fracture would be better appreciated with lumbar spine CT. Underlying bony mass in this area is also not excluded.  2. Indeterminate 1 cm lesion in the dorsal right aspect of L5  vertebral body. This could represent a primary bone lesion, metastatic disease, or atypical benign venous vascular malformation. Evaluation with CT may be helpful.  3. Severe left neural foraminal narrowing at L5-S1 secondary to degenerative changes and likely left foraminal disc extrusion.  4. Additional degenerative changes throughout the lumbar spine as detailed above with multiple levels of neural foraminal narrowing, next most pronounced at L4-L5.  5. Multiple chronic appearing compression deformities of the lumbar spine that appears similar to prior x-ray 9/9/2019 noting differences in technique.    Options/alternatives, benefits and risks were discussed with the patient including bleeding, infection, no pain relief, tissue trauma, exposure to radiation, reaction to medications, spinal cord injury, dural puncture, weakness, numbness and headache.  Questions were answered to his satisfaction and he agrees to proceed. Voluntary informed consent was obtained  and signed.     Vitals were reviewed:   Yes  Allergies were reviewed:   Yes   Medications were reviewed:   Yes (Last Warfarin 11/4/20)  INR   Date Value Ref Range Status   11/09/2020 1.10 0.86 - 1.14 Final     Comment:     This test is intended for monitoring Coumadin therapy.  Results are not   accurate in patients with prolonged INR due to factor deficiency.         Pre-procedure pain score: 5/10    Procedure:  After getting informed consent, patient was brought into the procedure suite and was placed in a prone position on the procedure table.   A Pause for the Cause was performed.  Patient was prepped and draped in sterile fashion.     The L5-S1 interspace was identified with AP fluoroscopy.  A total of 3ml of 1% lidocaine was used to anesthetize the skin for a left paramedian approach.    A 20gauge 3.5inch Touhy needle was advanced under intermittent fluoroscopy.  A PAT syringe was used to advance the needle into the epidural space.   After loss of resistance, there was no evidence of blood or CSF on aspiration. Location was verified with both AP and lateral fluoroscopic imaging.    A total of 1.5ml of Omnipaque 300 was injected demonstrating appropriate epidural spread and was checked in both the AP and lateral views. 8.5ml was wasted. There was no evidence of intravascular or intrathecal spread.    2 ml of 0.2% ropivacaine with 10mg of dexamethasone and 2ml of preservative free saline was injected.  The needle was removed from the epidural space, flushed with 1% lidocaine and removed.     Hemostasis was achieved, the area was cleaned, and bandaids were placed when appropriate.  The patient tolerated the procedure well, and was taken to the recovery room.    Images were saved to PACS.    Post-procedure pain score: 2/10  Follow-up includes:   -f/u phone call in one week  -f/u with referring provider  -restart Coumadin iman Lanier MD  [facility]

## 2020-11-09 NOTE — PROGRESS NOTES
ANTICOAGULATION FOLLOW-UP CLINIC VISIT    Patient Name:  Linwood Chi  Date:  2020  Contact Type:  Telephone  Had intentional hold for injection done today will increase dose next 3 days and recheck next week.  SUBJECTIVE:  Patient Findings     Comments:    Assessed for S/S bleeding, clotting, medication, diet, health, activity and alcohol changes          Clinical Outcomes     Negatives:  Major bleeding event, Thromboembolic event, Anticoagulation-related hospital admission, Anticoagulation-related ED visit, Anticoagulation-related fatality    Comments:    Assessed for S/S bleeding, clotting, medication, diet, health, activity and alcohol changes             OBJECTIVE    Recent labs: (last 7 days)     20  1234   INR 1.10       ASSESSMENT / PLAN  INR assessment SUB intentioal hold for injection   Recheck INR In: 1 WEEK    INR Location Clinic      Anticoagulation Summary  As of 2020    INR goal:  2.0-3.0   TTR:  73.3 % (1 y)   INR used for dosin.10 (2020)   Warfarin maintenance plan:  1.25 mg (2.5 mg x 0.5) every Mon; 2.5 mg (2.5 mg x 1) all other days   Full warfarin instructions:  11/10: 5 mg; : 5 mg; : 5 mg; Otherwise 1.25 mg every Mon; 2.5 mg all other days   Weekly warfarin total:  16.25 mg   Plan last modified:  Layla Garcia RN (10/7/2020)   Next INR check:  2020   Priority:  Maintenance   Target end date:  Indefinite    Indications    Long-term (current) use of anticoagulants [Z79.01] [Z79.01]  Atrial fibrillation (HCC) [I48.91] [I48.91]  Chronic atrial fibrillation (H) [I48.20]             Anticoagulation Episode Summary     INR check location:      Preferred lab:      Send INR reminders to:  SANDOR LILLY    Comments:        Anticoagulation Care Providers     Provider Role Specialty Phone number    Sony Canchola MD Referring Family Medicine [phone]            See the Encounter Report to view Anticoagulation Flowsheet and Dosing Calendar (Go  to Encounters tab in chart review, and find the Anticoagulation Therapy Visit)    Dosage adjustment made based on physician directed care plan.    Cheri Dang RN

## 2020-11-16 ENCOUNTER — ANTICOAGULATION THERAPY VISIT (OUTPATIENT)
Dept: FAMILY MEDICINE | Facility: CLINIC | Age: 85
End: 2020-11-16

## 2020-11-16 DIAGNOSIS — Z79.01 LONG TERM CURRENT USE OF ANTICOAGULANT THERAPY: ICD-10-CM

## 2020-11-16 DIAGNOSIS — I48.91 ATRIAL FIBRILLATION (H): ICD-10-CM

## 2020-11-16 DIAGNOSIS — I48.20 CHRONIC ATRIAL FIBRILLATION (H): ICD-10-CM

## 2020-11-16 LAB
CAPILLARY BLOOD COLLECTION: NORMAL
INR PPP: 2.4 (ref 0.86–1.14)

## 2020-11-16 PROCEDURE — 99207 PR NO CHARGE NURSE ONLY: CPT | Performed by: FAMILY MEDICINE

## 2020-11-16 PROCEDURE — 85610 PROTHROMBIN TIME: CPT | Performed by: FAMILY MEDICINE

## 2020-11-16 PROCEDURE — 36416 COLLJ CAPILLARY BLOOD SPEC: CPT | Performed by: FAMILY MEDICINE

## 2020-11-16 NOTE — PROGRESS NOTES
ANTICOAGULATION FOLLOW-UP CLINIC VISIT    Patient Name:  Linwood Chi  Date:  2020  Contact Type:  Telephone    SUBJECTIVE:         OBJECTIVE    Recent labs: (last 7 days)     20  1102   INR 2.40*       ASSESSMENT / PLAN  INR assessment THER    Recheck INR In: 2 WEEKS    INR Location Clinic      Anticoagulation Summary  As of 2020    INR goal:  2.0-3.0   TTR:  72.8 % (1 y)   INR used for dosin.40 (2020)   Warfarin maintenance plan:  1.25 mg (2.5 mg x 0.5) every Mon; 2.5 mg (2.5 mg x 1) all other days   Full warfarin instructions:  1.25 mg every Mon; 2.5 mg all other days   Weekly warfarin total:  16.25 mg   No change documented:  Cheri Dang RN   Plan last modified:  Layla Garcia RN (10/7/2020)   Next INR check:  2020   Priority:  Maintenance   Target end date:  Indefinite    Indications    Long-term (current) use of anticoagulants [Z79.01] [Z79.01]  Atrial fibrillation (HCC) [I48.91] [I48.91]  Chronic atrial fibrillation (H) [I48.20]             Anticoagulation Episode Summary     INR check location:      Preferred lab:      Send INR reminders to:  SANDOR LILLY    Comments:        Anticoagulation Care Providers     Provider Role Specialty Phone number    PranavmarcosSony toney MD Referring Family Medicine [phone]            See the Encounter Report to view Anticoagulation Flowsheet and Dosing Calendar (Go to Encounters tab in chart review, and find the Anticoagulation Therapy Visit)        Cheri Dang RN

## 2020-11-30 ENCOUNTER — ANTICOAGULATION THERAPY VISIT (OUTPATIENT)
Dept: FAMILY MEDICINE | Facility: CLINIC | Age: 85
End: 2020-11-30

## 2020-11-30 DIAGNOSIS — I48.91 ATRIAL FIBRILLATION (H): ICD-10-CM

## 2020-11-30 DIAGNOSIS — I48.20 CHRONIC ATRIAL FIBRILLATION (H): ICD-10-CM

## 2020-11-30 DIAGNOSIS — Z79.01 LONG TERM CURRENT USE OF ANTICOAGULANT THERAPY: ICD-10-CM

## 2020-11-30 DIAGNOSIS — M54.16 LUMBAR RADICULOPATHY: ICD-10-CM

## 2020-11-30 LAB
CAPILLARY BLOOD COLLECTION: NORMAL
INR PPP: 2.2 (ref 0.86–1.14)

## 2020-11-30 PROCEDURE — 85610 PROTHROMBIN TIME: CPT | Performed by: FAMILY MEDICINE

## 2020-11-30 PROCEDURE — 36416 COLLJ CAPILLARY BLOOD SPEC: CPT | Performed by: FAMILY MEDICINE

## 2020-11-30 NOTE — PROGRESS NOTES
ANTICOAGULATION MANAGEMENT     Patient Name:  Linwood Chi  Date:  2020    ASSESSMENT /SUBJECTIVE:    Today's INR result of 2.2 is therapeutic. Goal INR of 2.0-3.0      Warfarin dose taken: Warfarin taken as instructed    Diet: No new diet changes affecting INR    Medication changes/ interactions: No new medications/supplements affecting INR    Previous INR: Therapeutic     S/S of bleeding or thromboembolism: No    New injury or illness: No    Upcoming surgery, procedure or cardioversion: No    Additional findings: None      PLAN:    Telephone call with Linwood regarding INR result and instructed:     Warfarin Dosing Instructions: Continue your current warfarin dose 2.5 mg daily (pt reports this is what he has been taking at home). Calendar updated to reflect this.    Instructed patient to follow up no later than: 3 weeks  Lab visit scheduled    Education provided: Target INR goal and significance of current INR result      Fidelia verbalizes understanding and agrees to warfarin dosing plan.    Instructed to call the Anticoagulation Clinic for any changes, questions or concerns. (#[phone])        Cam Rodriguez RN      OBJECTIVE:  Recent labs: (last 7 days)     20  1414   INR 2.20*         No question data found.  Anticoagulation Summary  As of 2020    INR goal:  2.0-3.0   TTR:  75.5 % (1 y)   INR used for dosin.20 (2020)   Warfarin maintenance plan:  2.5 mg (2.5 mg x 1) every day   Full warfarin instructions:  : 2.5 mg; Otherwise 2.5 mg every day   Weekly warfarin total:  17.5 mg   Plan last modified:  Cam Rodriguez RN (2020)   Next INR check:  2020   Priority:  Maintenance   Target end date:  Indefinite    Indications    Long-term (current) use of anticoagulants [Z79.01] [Z79.01]  Atrial fibrillation (HCC) [I48.91] [I48.91]  Chronic atrial fibrillation (H) [I48.20]             Anticoagulation Episode Summary     INR check location:      Preferred lab:       Send INR reminders to:  SANDOR LILLY    Comments:        Anticoagulation Care Providers     Provider Role Specialty Phone number    Sony Canchola MD Referring Family Medicine [phone]

## 2020-12-21 ENCOUNTER — ANTICOAGULATION THERAPY VISIT (OUTPATIENT)
Dept: FAMILY MEDICINE | Facility: CLINIC | Age: 85
End: 2020-12-21

## 2020-12-21 DIAGNOSIS — Z79.01 LONG TERM CURRENT USE OF ANTICOAGULANT THERAPY: ICD-10-CM

## 2020-12-21 DIAGNOSIS — I48.20 CHRONIC ATRIAL FIBRILLATION (H): ICD-10-CM

## 2020-12-21 DIAGNOSIS — I48.91 ATRIAL FIBRILLATION (H): ICD-10-CM

## 2020-12-21 LAB
CAPILLARY BLOOD COLLECTION: NORMAL
INR PPP: 2.1 (ref 0.86–1.14)

## 2020-12-21 PROCEDURE — 85610 PROTHROMBIN TIME: CPT | Performed by: FAMILY MEDICINE

## 2020-12-21 PROCEDURE — 36416 COLLJ CAPILLARY BLOOD SPEC: CPT | Performed by: FAMILY MEDICINE

## 2020-12-21 NOTE — PROGRESS NOTES
ANTICOAGULATION MANAGEMENT     Patient Name:  Linwood Chi  Date:  2020    ASSESSMENT /SUBJECTIVE:    Today's INR result of 2.10 is therapeutic. Goal INR of 2.0-3.0      Warfarin dose taken: Warfarin taken as instructed    Diet: No new diet changes affecting INR    Medication changes/ interactions: No new medications/supplements affecting INR    Previous INR: Therapeutic     S/S of bleeding or thromboembolism: No    New injury or illness: No    Upcoming surgery, procedure or cardioversion: No    Additional findings: None      PLAN:    Telephone call with Linwood regarding INR result and instructed:     Warfarin Dosing Instructions: Continue your current warfarin dose 2.5mg every day.    Instructed patient to follow up no later than: 4 weeks  Lab visit scheduled    Education provided: None required      Linwood verbalizes understanding and agrees to warfarin dosing plan.    Instructed to call the Anticoagulation Clinic for any changes, questions or concerns. (#[phone])        Diaz Egan RN      OBJECTIVE:  Recent labs: (last 7 days)     20  1053   INR 2.10*         No question data found.  Anticoagulation Summary  As of 2020    INR goal:  2.0-3.0   TTR:  75.5 % (1 y)   INR used for dosin.10 (2020)   Warfarin maintenance plan:  2.5 mg (2.5 mg x 1) every day   Full warfarin instructions:  2.5 mg every day   Weekly warfarin total:  17.5 mg   No change documented:  Green, Diaz, RN   Plan last modified:  Cam Rodriguez RN (2020)   Next INR check:  2021   Priority:  Maintenance   Target end date:  Indefinite    Indications    Long-term (current) use of anticoagulants [Z79.01] [Z79.01]  Atrial fibrillation (HCC) [I48.91] [I48.91]  Chronic atrial fibrillation (H) [I48.20]             Anticoagulation Episode Summary     INR check location:      Preferred lab:      Send INR reminders to:  SANDOR LILLY    Comments:        Anticoagulation Care Providers     Provider Role  Specialty Phone number    Sony Canchola MD Referring Family Medicine [phone]

## 2020-12-30 DIAGNOSIS — I48.91 ATRIAL FIBRILLATION, UNSPECIFIED TYPE (H): ICD-10-CM

## 2020-12-30 RX ORDER — WARFARIN SODIUM 2.5 MG/1
TABLET ORAL
Qty: 90 TABLET | Refills: 0 | Status: SHIPPED | OUTPATIENT
Start: 2020-12-30 | End: 2021-04-14

## 2021-01-10 DIAGNOSIS — I48.91 ATRIAL FIBRILLATION, UNSPECIFIED TYPE (H): ICD-10-CM

## 2021-01-11 RX ORDER — WARFARIN SODIUM 2.5 MG/1
TABLET ORAL
Qty: 90 TABLET | Refills: 0 | OUTPATIENT
Start: 2021-01-11

## 2021-01-11 NOTE — TELEPHONE ENCOUNTER
Verified that pharmacy received the 12/30/2020 Rx and refused back.    Coleen Kramer RN    [facility] Anticoagulation [facility]

## 2021-01-14 DIAGNOSIS — N18.30 CKD (CHRONIC KIDNEY DISEASE) STAGE 3, GFR 30-59 ML/MIN (H): ICD-10-CM

## 2021-01-14 DIAGNOSIS — I10 HTN, GOAL BELOW 140/90: Primary | ICD-10-CM

## 2021-01-14 NOTE — LETTER
Dear Linwood,    Your provider has sent a  eugene refill of Amlodipine and Lisinopril. You are due for an appointment for further refills.  Please contact the clinic to schedule an appointment for further refills.     Josiane Michaud CMA

## 2021-01-15 RX ORDER — LISINOPRIL 20 MG/1
TABLET ORAL
Qty: 90 TABLET | Refills: 0 | Status: SHIPPED | OUTPATIENT
Start: 2021-01-15 | End: 2021-04-19

## 2021-01-15 RX ORDER — AMLODIPINE BESYLATE 5 MG/1
TABLET ORAL
Qty: 90 TABLET | Refills: 0 | Status: SHIPPED | OUTPATIENT
Start: 2021-01-15 | End: 2021-04-19

## 2021-01-25 ENCOUNTER — TELEPHONE (OUTPATIENT)
Dept: FAMILY MEDICINE | Facility: CLINIC | Age: 86
End: 2021-01-25

## 2021-01-25 ENCOUNTER — ANTICOAGULATION THERAPY VISIT (OUTPATIENT)
Dept: NURSING | Facility: CLINIC | Age: 86
End: 2021-01-25

## 2021-01-25 DIAGNOSIS — I48.20 CHRONIC ATRIAL FIBRILLATION (H): ICD-10-CM

## 2021-01-25 DIAGNOSIS — I48.91 ATRIAL FIBRILLATION (H): ICD-10-CM

## 2021-01-25 DIAGNOSIS — I48.20 CHRONIC ATRIAL FIBRILLATION (H): Primary | ICD-10-CM

## 2021-01-25 DIAGNOSIS — Z79.01 LONG TERM CURRENT USE OF ANTICOAGULANT THERAPY: ICD-10-CM

## 2021-01-25 LAB
CAPILLARY BLOOD COLLECTION: NORMAL
INR PPP: 2.5 (ref 0.86–1.14)

## 2021-01-25 PROCEDURE — 85610 PROTHROMBIN TIME: CPT | Performed by: FAMILY MEDICINE

## 2021-01-25 PROCEDURE — 36416 COLLJ CAPILLARY BLOOD SPEC: CPT | Performed by: FAMILY MEDICINE

## 2021-01-25 NOTE — TELEPHONE ENCOUNTER
ANTICOAGULATION MANAGEMENT      Linwood Chi due for annual renewal of referral to anticoagulation monitoring. Order pended for your review and signature.      ANTICOAGULATION SUMMARY      Warfarin indication(s)     Atrial fibrillation    Heart valve present?  NO       Current goal range   INR: 2.0-3.0     Goal appropriate for indication? Yes, INR 2-3 appropriate for hx of DVT, PE, hypercoagulable state, Afib, LVAD, or bileaflet AVR without risk factors     Current duration of therapy Indefinite/long term therapy   Time in Therapeutic Range (TTR)  (Goal > 60%) 75.5 %       Office visit with referring provider's group within last year yes on 10/5/2020       Layla Garcia RN

## 2021-01-25 NOTE — PROGRESS NOTES
ANTICOAGULATION MANAGEMENT     Patient Name:  Linwood Chi  Date:  2021    ASSESSMENT /SUBJECTIVE:    Today's INR result of 2.5 is therapeutic. Goal INR of 2.0-3.0      Warfarin dose taken: Warfarin taken as instructed    Diet: No new diet changes affecting INR    Medication changes/ interactions: No new medications/supplements affecting INR    Previous INR: Therapeutic     S/S of bleeding or thromboembolism: No    New injury or illness: No    Upcoming surgery, procedure or cardioversion: No    Additional findings: None      PLAN:    Telephone call with Linwood regarding INR result and instructed:     Warfarin Dosing Instructions: Continue your current warfarin dose 2.5    Instructed patient to follow up no later than: 4 weeks  Lab visit scheduled    Education provided: Target INR goal and significance of current INR result      Linwood verbalizes understanding and agrees to warfarin dosing plan.    Instructed to call the Anticoagulation Clinic for any changes, questions or concerns. (#[phone])        Layla Garcia RN      OBJECTIVE:  Recent labs: (last 7 days)     21  1055   INR 2.50*         No question data found.  Anticoagulation Summary  As of 2021    INR goal:  2.0-3.0   TTR:  75.5 % (1 y)   INR used for dosin.50 (2021)   Warfarin maintenance plan:  2.5 mg (2.5 mg x 1) every day   Full warfarin instructions:  2.5 mg every day   Weekly warfarin total:  17.5 mg   No change documented:  Layla Garcia RN   Plan last modified:  Cam Rodriguez RN (2020)   Next INR check:  2021   Priority:  Maintenance   Target end date:  Indefinite    Indications    Long-term (current) use of anticoagulants [Z79.01] [Z79.01]  Atrial fibrillation (HCC) [I48.91] [I48.91]  Chronic atrial fibrillation (H) [I48.20]             Anticoagulation Episode Summary     INR check location:      Preferred lab:      Send INR reminders to:  SANDOR LILLY    Comments:        Anticoagulation Care  Providers     Provider Role Specialty Phone number    Sony Canchola MD Referring Family Medicine [phone]

## 2021-02-22 ENCOUNTER — ANTICOAGULATION THERAPY VISIT (OUTPATIENT)
Dept: FAMILY MEDICINE | Facility: CLINIC | Age: 86
End: 2021-02-22

## 2021-02-22 DIAGNOSIS — Z79.01 LONG TERM CURRENT USE OF ANTICOAGULANT THERAPY: ICD-10-CM

## 2021-02-22 DIAGNOSIS — I48.20 CHRONIC ATRIAL FIBRILLATION (H): ICD-10-CM

## 2021-02-22 DIAGNOSIS — I48.91 ATRIAL FIBRILLATION (H): ICD-10-CM

## 2021-02-22 LAB
CAPILLARY BLOOD COLLECTION: NORMAL
INR PPP: 2.2 (ref 0.86–1.14)

## 2021-02-22 PROCEDURE — 85610 PROTHROMBIN TIME: CPT | Performed by: FAMILY MEDICINE

## 2021-02-22 PROCEDURE — 36416 COLLJ CAPILLARY BLOOD SPEC: CPT | Performed by: FAMILY MEDICINE

## 2021-02-22 PROCEDURE — 99207 PR NO CHARGE NURSE ONLY: CPT | Performed by: FAMILY MEDICINE

## 2021-02-22 NOTE — PROGRESS NOTES
ANTICOAGULATION FOLLOW-UP CLINIC VISIT    Patient Name:  Linwood Chi  Date:  2021  Contact Type:  Telephone    SUBJECTIVE:  Patient Findings     Comments:    Assessed for S/S bleeding, clotting, medication, diet, health, activity and alcohol changes          Clinical Outcomes     Negatives:  Major bleeding event, Thromboembolic event, Anticoagulation-related hospital admission, Anticoagulation-related ED visit, Anticoagulation-related fatality    Comments:    Assessed for S/S bleeding, clotting, medication, diet, health, activity and alcohol changes             OBJECTIVE    Recent labs: (last 7 days)     21  1039   INR 2.20*       ASSESSMENT / PLAN  INR assessment THER    Recheck INR In: 4 WEEKS    INR Location Clinic      Anticoagulation Summary  As of 2021    INR goal:  2.0-3.0   TTR:  75.5 % (1 y)   INR used for dosin.20 (2021)   Warfarin maintenance plan:  2.5 mg (2.5 mg x 1) every day   Full warfarin instructions:  2.5 mg every day   Weekly warfarin total:  17.5 mg   No change documented:  Cheri Dang RN   Plan last modified:  Cam Rodriguez RN (2020)   Next INR check:  3/8/2021   Priority:  Maintenance   Target end date:  Indefinite    Indications    Long-term (current) use of anticoagulants [Z79.01] [Z79.01]  Atrial fibrillation (HCC) [I48.91] [I48.91]  Chronic atrial fibrillation (H) [I48.20]             Anticoagulation Episode Summary     INR check location:      Preferred lab:      Send INR reminders to:  SANDOR LILLY    Comments:        Anticoagulation Care Providers     Provider Role Specialty Phone number    Jesika Sony Vásquez MD Referring Family Medicine [phone]            See the Encounter Report to view Anticoagulation Flowsheet and Dosing Calendar (Go to Encounters tab in chart review, and find the Anticoagulation Therapy Visit)        Cheri Dang RN

## 2021-03-22 ENCOUNTER — ANTICOAGULATION THERAPY VISIT (OUTPATIENT)
Dept: FAMILY MEDICINE | Facility: CLINIC | Age: 86
End: 2021-03-22

## 2021-03-22 DIAGNOSIS — Z79.01 LONG TERM CURRENT USE OF ANTICOAGULANT THERAPY: ICD-10-CM

## 2021-03-22 DIAGNOSIS — I48.20 CHRONIC ATRIAL FIBRILLATION (H): ICD-10-CM

## 2021-03-22 DIAGNOSIS — I48.91 ATRIAL FIBRILLATION (H): ICD-10-CM

## 2021-03-22 LAB
CAPILLARY BLOOD COLLECTION: NORMAL
INR PPP: 2 (ref 0.86–1.14)

## 2021-03-22 PROCEDURE — 85610 PROTHROMBIN TIME: CPT | Performed by: FAMILY MEDICINE

## 2021-03-22 PROCEDURE — 36416 COLLJ CAPILLARY BLOOD SPEC: CPT | Performed by: FAMILY MEDICINE

## 2021-03-22 NOTE — PROGRESS NOTES
Anticoagulation Management    Unable to reach Linwood (2nd attempt) today.    Left message to continue current dose of warfarin 2.5 mg tonight.      Anticoagulation clinic to follow up    Layla Garcia RN

## 2021-03-22 NOTE — PROGRESS NOTES
Anticoagulation Management    Unable to reach Linwood today.    Today's INR result of 2.0 is therapeutic (goal INR of 2.0-3.0).  Result received from: Clinic Lab    Follow up required to confirm warfarin dose taken and assess for changes    Left message to call [phone]     Plan: continue maintenance dose and recheck INR in 4 weeks.    Anticoagulation clinic to follow up    Layal Garcia RN

## 2021-03-23 NOTE — PROGRESS NOTES
Pt returned call to [facility]. Completed subjective assessment.   Plan to continue with dosing plan of .2.5mg every day.     ANTICOAGULATION MANAGEMENT     Patient Name:  Linwood Chi  Date:  3/23/2021    ASSESSMENT /SUBJECTIVE:    2/22/21 INR result of 2.00 is therapeutic. Goal INR of 2.0-3.0      Warfarin dose taken: Warfarin taken as instructed    Diet: No new diet changes affecting INR    Medication changes/ interactions: No new medications/supplements affecting INR    Previous INR: Therapeutic     S/S of bleeding or thromboembolism: No    New injury or illness: No    Upcoming surgery, procedure or cardioversion: No    Additional findings: None

## 2021-03-23 NOTE — PROGRESS NOTES
ANTICOAGULATION MANAGEMENT     Patient Name:  Linwood Chi  Date:  3/23/2021    ASSESSMENT /SUBJECTIVE:    3/23/21 INR result of 2.00 is therapeutic. Goal INR of 2.0-3.0    PLAN:    Telephone call with Linwood regarding INR result and instructed:     Warfarin Dosing Instructions: Continue your current warfarin dose 2.5mg every day.    Instructed patient to follow up no later than: 6 weeks  Left detailed message with recommended recheck date    Education provided: Please call back if any changes to your diet, medications or how you've been taking warfarin, Target INR goal and significance of current INR result and Importance of therapeutic range    Instructed to call the Anticoagulation Clinic for any changes, questions or concerns. (#[phone])        Diaz Egan RN      OBJECTIVE:  Recent labs: (last 7 days)     21  1059   INR 2.00*         No question data found.  Anticoagulation Summary  As of 3/22/2021    INR goal:  2.0-3.0   TTR:  76.4 % (1 y)   INR used for dosin.00 (3/22/2021)   Warfarin maintenance plan:  2.5 mg (2.5 mg x 1) every day   Full warfarin instructions:  2.5 mg every day   Weekly warfarin total:  17.5 mg   No change documented:  Layla Garcia RN   Plan last modified:  Cam Rodriguez RN (2020)   Next INR check:  2021   Priority:  Maintenance   Target end date:  Indefinite    Indications    Long-term (current) use of anticoagulants [Z79.01] [Z79.01]  Atrial fibrillation (HCC) [I48.91] [I48.91]  Chronic atrial fibrillation (H) [I48.20]             Anticoagulation Episode Summary     INR check location:      Preferred lab:      Send INR reminders to:  SANDOR LILLY    Comments:        Anticoagulation Care Providers     Provider Role Specialty Phone number    Sony Canchola MD Referring Family Medicine [phone]

## 2021-04-07 DIAGNOSIS — I10 HTN, GOAL BELOW 140/90: ICD-10-CM

## 2021-04-07 DIAGNOSIS — E11.21 TYPE 2 DIABETES MELLITUS WITH DIABETIC NEPHROPATHY, WITHOUT LONG-TERM CURRENT USE OF INSULIN (H): ICD-10-CM

## 2021-04-07 NOTE — LETTER
April 16, 2021      Linwood Chi  [id number] [address] [id number]            We have received multiple refill requests. You are due for an appointment for further refills. Please contact the clinic to schedule an appointment for further refills.      Sincerely,       [facility] Hospital[facility] BARTLETT

## 2021-04-09 RX ORDER — AMLODIPINE BESYLATE 5 MG/1
TABLET ORAL
Qty: 90 TABLET | Refills: 0 | OUTPATIENT
Start: 2021-04-09

## 2021-04-09 RX ORDER — LISINOPRIL 20 MG/1
TABLET ORAL
Qty: 90 TABLET | Refills: 0 | OUTPATIENT
Start: 2021-04-09

## 2021-04-14 DIAGNOSIS — I10 BENIGN ESSENTIAL HYPERTENSION: ICD-10-CM

## 2021-04-14 DIAGNOSIS — E78.5 HYPERLIPIDEMIA WITH TARGET LDL LESS THAN 100: ICD-10-CM

## 2021-04-14 DIAGNOSIS — I10 HTN, GOAL BELOW 140/90: ICD-10-CM

## 2021-04-14 DIAGNOSIS — I48.91 ATRIAL FIBRILLATION, UNSPECIFIED TYPE (H): ICD-10-CM

## 2021-04-14 RX ORDER — WARFARIN SODIUM 2.5 MG/1
TABLET ORAL
Qty: 90 TABLET | Refills: 0 | Status: SHIPPED | OUTPATIENT
Start: 2021-04-14 | End: 2021-04-19

## 2021-04-14 RX ORDER — ATORVASTATIN CALCIUM 40 MG/1
40 TABLET, FILM COATED ORAL DAILY
Qty: 90 TABLET | Refills: 1 | Status: SHIPPED | OUTPATIENT
Start: 2021-04-14 | End: 2021-04-19

## 2021-04-14 NOTE — TELEPHONE ENCOUNTER
Patient made an appt for 05/04/21 needs warfarin ANTICOAGULANT (COUMADIN) 2.5 MG tablet and   atorvastatin (LIPITOR) 40 MG tablet before his appt.  Evie Rawls,

## 2021-04-16 RX ORDER — LISINOPRIL 20 MG/1
TABLET ORAL
Qty: 90 TABLET | Refills: 0 | OUTPATIENT
Start: 2021-04-16

## 2021-04-16 RX ORDER — AMLODIPINE BESYLATE 5 MG/1
TABLET ORAL
Qty: 90 TABLET | Refills: 0 | OUTPATIENT
Start: 2021-04-16

## 2021-04-16 NOTE — TELEPHONE ENCOUNTER
Attempt 2, called patient and left VM to call clinic. Please help schedule med check appointment if patient returns call.  Brianne BERMUDEZ CMA ([facility])    
Left message for patient to call back. Please assist with scheduling medication recheck visit with provider.     [id number], MEDICAL ASSISTANT     
Letter mailed to patient.  Brianne BERMUDEZ CMA ([facility])    
Patient needs follow up for refills  
Please mail letter to patient.  
Routing refill request to provider for review/approval because:  Sarah given x1 and patient did not follow up, please advise  BP Readings from Last 3 Encounters:   11/09/20 (!) 160/88   10/29/20 (!) 198/89   09/22/20 (!) 187/83             
Additional Progress Note...

## 2021-04-16 NOTE — TELEPHONE ENCOUNTER
Routing refill request to providers' pool (due to PCP out of office) for review/approval because:  BP    Pending Prescriptions:                       Disp   Refills    amLODIPine (NORVASC) 5 MG tablet [Pharmacy*90 tab*0        Sig: TAKE ONE TABLET BY MOUTH EVERY DAY FOR HYPERTENSION    lisinopril (ZESTRIL) 20 MG tablet [Pharmac*90 tab*0        Sig: TAKE 1 TABLET BY MOUTH DAILY      Dom Sol RN

## 2021-04-19 DIAGNOSIS — I10 HTN, GOAL BELOW 140/90: ICD-10-CM

## 2021-04-19 DIAGNOSIS — E78.5 HYPERLIPIDEMIA WITH TARGET LDL LESS THAN 100: ICD-10-CM

## 2021-04-19 DIAGNOSIS — I10 BENIGN ESSENTIAL HYPERTENSION: ICD-10-CM

## 2021-04-19 DIAGNOSIS — I48.91 ATRIAL FIBRILLATION, UNSPECIFIED TYPE (H): ICD-10-CM

## 2021-04-19 RX ORDER — ATORVASTATIN CALCIUM 40 MG/1
40 TABLET, FILM COATED ORAL DAILY
Qty: 90 TABLET | Refills: 1 | Status: SHIPPED | OUTPATIENT
Start: 2021-04-19

## 2021-04-19 RX ORDER — AMLODIPINE BESYLATE 5 MG/1
TABLET ORAL
Qty: 90 TABLET | Refills: 0 | Status: SHIPPED | OUTPATIENT
Start: 2021-04-19 | End: 2021-05-04

## 2021-04-19 RX ORDER — WARFARIN SODIUM 2.5 MG/1
TABLET ORAL
Qty: 90 TABLET | Refills: 4 | Status: SHIPPED | OUTPATIENT
Start: 2021-04-19

## 2021-04-19 RX ORDER — LISINOPRIL 20 MG/1
TABLET ORAL
Qty: 90 TABLET | Refills: 0 | Status: SHIPPED | OUTPATIENT
Start: 2021-04-19

## 2021-04-19 NOTE — TELEPHONE ENCOUNTER
Reason for Call:  Medication or medication refill:    Do you use a [facility]?  Name of the pharmacy and phone number for the current request:  [facility] in [place]    Name of the medication requested: atorvastatin (LIPITOR) 40 MG tablet    Other request: warfarin ANTICOAGULANT (COUMADIN) 2.5 MG tablet, amLODIPine (NORVASC) 5 MG tablet, and lisinopril (ZESTRIL) 20 MG tablet    Patient was told he needed an appointment before he could get a refill.  He is scheduled to see Dr. Canchola on Tuesday, May 4th at 12 noon, 11:45 for labs.    Took his last warfarin today.    Can we leave a detailed message on this number? YES    Phone number patient can be reached at: Home number on file [phone] (home)    Best Time: anytime    Call taken on 4/19/2021 at 11:15 AM by Kasia Hilario

## 2021-05-03 NOTE — PROGRESS NOTES
"    Assessment & Plan       Linwood is a 89-year-old male with htn, A fib, cad, ckd3, hpld, DM2,., proteinuria, hx AAA s/p repair presenting for follow up    Rash of groin      Rash consistent with intertrigo; will do antifungal and inflammatory combination cream.  - clotrimazole-betamethasone (LOTRISONE) 1-0.05 % external cream; (Apply to affected area for 7 -10 days)    HTN, goal below 140/90  Blood pressure is not at goal.  Discussed increasing the dose of amlodipine to 10 mg daily from 5 mg.  We will continue metoprolol and lisinopril.  Recheck blood pressure in 2 weeks or sooner with concerns.  - amLODIPine (NORVASC) 5 MG tablet; Take 2 tablets (10 mg) by mouth daily For HTN  - Basic metabolic panel    Albuminuria  Previously showed significant amount of albumin in urine; will recheck if still high will refer to nephrology to assess for nephrotic syndrome.  - Albumin Random Urine Quantitative with Creat [id number]     BMI:   Estimated body mass index is 29.18 kg/m  as calculated from the following:    Height as of 10/29/20: 1.626 m (5' 4\").    Weight as of this encounter: 77.1 kg (170 lb).   Weight management plan: Discussed healthy diet and exercise guidelines      Return in about 2 weeks (around 5/18/2021) for Follow up with PCP.    Sony Canchola MD  [facility]    Faisal Palumbo is a 89 year old who presents for the following health issues:      HPI     Chief Complaint   Patient presents with     Infection     in the groin area   Rash x 3 weeks  Rash in the groins; put on some powder    HTN:   ON Lisinopril 20 mg daily, metoprolol 25 mg and Amlodipine 5 mg.  Reviewed prior blood pressures; which have been  BP Readings from Last 3 Encounters:   05/04/21 (!) 158/76   11/09/20 (!) 160/88   10/29/20 (!) 198/89     Review of Systems   Constitutional, HEENT, cardiovascular, pulmonary, gi and gu systems are negative, except as otherwise noted.      Objective    There were no " vitals taken for this visit.  There is no height or weight on file to calculate BMI.  Physical Exam   GENERAL: healthy, alert and no distress  RESP: lungs clear to auscultation - no rales, rhonchi or wheezes  CV: regular rate and rhythm, no murmur, click or rub, no peripheral edema   MS: no gross musculoskeletal defects noted, no edema  PSYCH: mentation appears normal, affect normal/bright

## 2021-05-04 ENCOUNTER — ANTICOAGULATION THERAPY VISIT (OUTPATIENT)
Dept: ANTICOAGULATION | Facility: CLINIC | Age: 86
End: 2021-05-04

## 2021-05-04 ENCOUNTER — OFFICE VISIT (OUTPATIENT)
Dept: FAMILY MEDICINE | Facility: CLINIC | Age: 86
End: 2021-05-04
Payer: COMMERCIAL

## 2021-05-04 VITALS
DIASTOLIC BLOOD PRESSURE: 76 MMHG | WEIGHT: 170 LBS | BODY MASS INDEX: 29.18 KG/M2 | TEMPERATURE: 97.5 F | SYSTOLIC BLOOD PRESSURE: 158 MMHG | HEART RATE: 70 BPM | OXYGEN SATURATION: 99 %

## 2021-05-04 DIAGNOSIS — E08.9 DIABETES MELLITUS DUE TO UNDERLYING CONDITION WITHOUT COMPLICATION, WITHOUT LONG-TERM CURRENT USE OF INSULIN (H): ICD-10-CM

## 2021-05-04 DIAGNOSIS — I48.20 CHRONIC ATRIAL FIBRILLATION (H): ICD-10-CM

## 2021-05-04 DIAGNOSIS — Z79.01 LONG TERM CURRENT USE OF ANTICOAGULANT THERAPY: ICD-10-CM

## 2021-05-04 DIAGNOSIS — E08.9: Primary | ICD-10-CM

## 2021-05-04 DIAGNOSIS — I48.91 ATRIAL FIBRILLATION (H): ICD-10-CM

## 2021-05-04 DIAGNOSIS — R80.9 ALBUMINURIA: ICD-10-CM

## 2021-05-04 DIAGNOSIS — R21 RASH OF GROIN: Primary | ICD-10-CM

## 2021-05-04 DIAGNOSIS — I10 HTN, GOAL BELOW 140/90: ICD-10-CM

## 2021-05-04 LAB
ANION GAP SERPL CALCULATED.3IONS-SCNC: 3 MMOL/L (ref 3–14)
BUN SERPL-MCNC: 30 MG/DL (ref 7–30)
CALCIUM SERPL-MCNC: 8.8 MG/DL (ref 8.5–10.1)
CAPILLARY BLOOD COLLECTION: NORMAL
CHLORIDE SERPL-SCNC: 107 MMOL/L (ref 94–109)
CO2 SERPL-SCNC: 27 MMOL/L (ref 20–32)
CREAT SERPL-MCNC: 1.45 MG/DL (ref 0.66–1.25)
CREAT UR-MCNC: 163 MG/DL
GFR SERPL CREATININE-BSD FRML MDRD: 42 ML/MIN/{1.73_M2}
GLUCOSE SERPL-MCNC: 106 MG/DL (ref 70–99)
INR PPP: 2.1 (ref 0.86–1.14)
MICROALBUMIN UR-MCNC: 6240 MG/L
MICROALBUMIN/CREAT UR: 3828.22 MG/G CR (ref 0–17)
POTASSIUM SERPL-SCNC: 4.4 MMOL/L (ref 3.4–5.3)
SODIUM SERPL-SCNC: 137 MMOL/L (ref 133–144)

## 2021-05-04 PROCEDURE — 80048 BASIC METABOLIC PNL TOTAL CA: CPT | Performed by: FAMILY MEDICINE

## 2021-05-04 PROCEDURE — 99214 OFFICE O/P EST MOD 30 MIN: CPT | Performed by: FAMILY MEDICINE

## 2021-05-04 PROCEDURE — 85610 PROTHROMBIN TIME: CPT | Performed by: FAMILY MEDICINE

## 2021-05-04 PROCEDURE — 36415 COLL VENOUS BLD VENIPUNCTURE: CPT | Performed by: FAMILY MEDICINE

## 2021-05-04 PROCEDURE — 82043 UR ALBUMIN QUANTITATIVE: CPT | Performed by: FAMILY MEDICINE

## 2021-05-04 RX ORDER — CLOTRIMAZOLE AND BETAMETHASONE DIPROPIONATE 10; .64 MG/G; MG/G
CREAM TOPICAL
Qty: 45 G | Refills: 0 | Status: SHIPPED | OUTPATIENT
Start: 2021-05-04

## 2021-05-04 RX ORDER — AMLODIPINE BESYLATE 5 MG/1
10 TABLET ORAL DAILY
Qty: 90 TABLET | Refills: 0
Start: 2021-05-04 | End: 2021-07-09

## 2021-05-04 NOTE — PROGRESS NOTES
ANTICOAGULATION MANAGEMENT     Patient Name:  Linwood Chi  Date:  2021    ASSESSMENT /SUBJECTIVE:    Today's INR result of 2.10 is therapeutic. Goal INR of 2.0-3.0      Warfarin dose taken: Warfarin taken as instructed    Diet: No new diet changes affecting INR    Medication changes/ interactions: Interaction between Lotrisone started on 21 and warfarin may be affecting INR    Previous INR: Therapeutic     S/S of bleeding or thromboembolism: No    New injury or illness: No    Upcoming surgery, procedure or cardioversion: No    Additional findings: Amlodipine dose increased.      PLAN:    Telephone call with Linwood regarding INR result and instructed:     Warfarin Dosing Instructions: Continue your current warfarin dose 2.5 mg daily.    Instructed patient to follow up no later than: 2 weeks  Check at provider office visit    Education provided: Contact [facility] Anticoagulation: [phone]  with any changes, questions or concerns.       Linwood verbalizes understanding and agrees to warfarin dosing plan.    Instructed to call the Anticoagulation Clinic for any changes, questions or concerns. (#[phone])        Elizabeth Marrero RN      OBJECTIVE:  Recent labs: (last 7 days)     21  1118   INR 2.10*         No question data found.  Anticoagulation Summary  As of 2021    INR goal:  2.0-3.0   TTR:  87.0 % (1 y)   INR used for dosin.10 (2021)   Warfarin maintenance plan:  2.5 mg (2.5 mg x 1) every day   Full warfarin instructions:  2.5 mg every day   Weekly warfarin total:  17.5 mg   No change documented:  Elizabeth Marrero RN   Plan last modified:  Cam Rodriguez RN (2020)   Next INR check:  2021   Priority:  Maintenance   Target end date:  Indefinite    Indications    Long-term (current) use of anticoagulants [Z79.01] [Z79.01]  Atrial fibrillation (HCC) [I48.91] [I48.91]  Chronic atrial fibrillation (H) [I48.20]             Anticoagulation  Episode Summary     INR check location:      Preferred lab:      Send INR reminders to:  SANDOR LILLY    Comments:        Anticoagulation Care Providers     Provider Role Specialty Phone number    Sony Canchola MD Referring Family Medicine [phone]

## 2021-05-04 NOTE — PATIENT INSTRUCTIONS
Hypertension Plan:    1. Increase Amlodipine from 5 mg (1 tablet) to 10 mg (2 tablets) daily  2. Continue Lisinopril 20 mg (1 tablet) daily  3. Continue Metoprolol 25 mg (1tablet) daily    Follow up for BP check in 2 weeks

## 2021-05-14 DIAGNOSIS — E11.21 TYPE 2 DIABETES MELLITUS WITH DIABETIC NEPHROPATHY, WITHOUT LONG-TERM CURRENT USE OF INSULIN (H): ICD-10-CM

## 2021-05-18 ENCOUNTER — OFFICE VISIT (OUTPATIENT)
Dept: FAMILY MEDICINE | Facility: CLINIC | Age: 86
End: 2021-05-18
Payer: COMMERCIAL

## 2021-05-18 VITALS
DIASTOLIC BLOOD PRESSURE: 66 MMHG | WEIGHT: 171 LBS | BODY MASS INDEX: 29.35 KG/M2 | SYSTOLIC BLOOD PRESSURE: 134 MMHG | HEART RATE: 76 BPM | OXYGEN SATURATION: 93 %

## 2021-05-18 DIAGNOSIS — I71.40 ABDOMINAL AORTIC ANEURYSM (AAA) WITHOUT RUPTURE (H): ICD-10-CM

## 2021-05-18 DIAGNOSIS — I10 HTN, GOAL BELOW 140/90: ICD-10-CM

## 2021-05-18 DIAGNOSIS — R21 RASH OF GROIN: Primary | ICD-10-CM

## 2021-05-18 DIAGNOSIS — N18.31 STAGE 3A CHRONIC KIDNEY DISEASE (H): ICD-10-CM

## 2021-05-18 DIAGNOSIS — E11.69 TYPE 2 DIABETES MELLITUS WITH OTHER SPECIFIED COMPLICATION, WITHOUT LONG-TERM CURRENT USE OF INSULIN (H): ICD-10-CM

## 2021-05-18 DIAGNOSIS — E78.5 HYPERLIPIDEMIA LDL GOAL <100: ICD-10-CM

## 2021-05-18 LAB
CHOLEST SERPL-MCNC: 162 MG/DL
HBA1C MFR BLD: 6.2 % (ref 0–5.6)
HDLC SERPL-MCNC: 57 MG/DL
LDLC SERPL CALC-MCNC: 84 MG/DL
NONHDLC SERPL-MCNC: 105 MG/DL
TRIGL SERPL-MCNC: 103 MG/DL

## 2021-05-18 PROCEDURE — 99214 OFFICE O/P EST MOD 30 MIN: CPT | Performed by: FAMILY MEDICINE

## 2021-05-18 PROCEDURE — 80061 LIPID PANEL: CPT | Performed by: FAMILY MEDICINE

## 2021-05-18 PROCEDURE — 83036 HEMOGLOBIN GLYCOSYLATED A1C: CPT | Performed by: FAMILY MEDICINE

## 2021-05-18 PROCEDURE — 99207 PR FOOT EXAM NO CHARGE: CPT | Performed by: FAMILY MEDICINE

## 2021-05-18 PROCEDURE — 36415 COLL VENOUS BLD VENIPUNCTURE: CPT | Performed by: FAMILY MEDICINE

## 2021-05-18 NOTE — LETTER
May 24, 2021      Linwood Chi  [address] [address] [facility] [address]        Dear ,    We are writing to inform you of your test results.    Normal Results      Resulted Orders   Lipid panel reflex to direct LDL Fasting   Result Value Ref Range    Cholesterol 162 <200 mg/dL    Triglycerides 103 <150 mg/dL      Comment:      Fasting specimen    HDL Cholesterol 57 >39 mg/dL    LDL Cholesterol Calculated 84 <100 mg/dL      Comment:      Desirable:       <100 mg/dl    Non HDL Cholesterol 105 <130 mg/dL   Hemoglobin A1c   Result Value Ref Range    Hemoglobin A1C 6.2 (H) 0 - 5.6 %      Comment:      Normal <5.7% Prediabetes 5.7-6.4%  Diabetes 6.5% or higher - adopted from ADA   consensus guidelines.         If you have any questions or concerns, please call the clinic at the number listed above.       Sincerely,      Sony Canchola MD/sienna

## 2021-05-18 NOTE — TELEPHONE ENCOUNTER
Routing refill request to provider for review/approval because:  Labs not current:  CR    A1C was done today:     Lab Results   Component Value Date    A1C 6.2 05/18/2021    A1C 6.1 09/16/2020    A1C 6.0 02/04/2020    A1C 5.7 09/09/2019    A1C 6.0 02/06/2019       Creatinine   Date Value Ref Range Status   05/04/2021 1.45 (H) 0.66 - 1.25 mg/dL Final             Pending Prescriptions:                       Disp   Refills    metFORMIN (GLUCOPHAGE) 500 MG tablet [Phar*180 ta*1        Sig: TAKE 1 TABLET BY MOUTH TWICE A DAY        Dom Sol RN

## 2021-05-18 NOTE — PROGRESS NOTES
Assessment & Plan   Linwood is a 89-year-old male with htn, A fib, cad, ckd3, hpld, DM2,., proteinuria, hx AAA presenting for follow up    Rash of groin    -  Resolving well on Lotrisone.    Type 2 diabetes mellitus with other specified complication, without long-term current use of insulin (H)   Well controlled  - FOOT EXAM  - Hemoglobin A1c    HTN, goal below 140/90    -  Well controlled    Stage 3a chronic kidney disease    From [facility] 2 weeks ago. Previously normal   Follow renal panel in 3 months    Hyperlipidemia LDL goal <100    Stable  - Lipid panel reflex to direct LDL Fasting    HX AAA repair: Abdominal aortic aneurysm (AAA) without rupture (H)    Endovascular aortic aneurysm repair with [place] Excluder  Main body right: [id number]  Right iliac limb (18F): 20mmx9.5cm  Left iliac limb (14F): 13asw29ph  2/15/2019    Return in about 3 months (around 8/18/2021) for Routine Visit.    Sony Canchola MD  [facility]    Faisal Palumbo is a 89 year old who presents for the following health issues     HPI     Medication recheck  Rash in groin: healing well.  HTN: Increased dose of Amlodipine to 10 mg daily  CKD: stage 3 Albiuminuria: Has appointment with nephrology  DM2;  Last A1c was 6.1 6months ago             Eye check: [facility]  Last week. Fax     Review of Systems   Constitutional, HEENT, cardiovascular, pulmonary, gi and gu systems are negative, except as otherwise noted.      Objective    /66   Pulse 76   Wt 77.6 kg (171 lb)   SpO2 93%   BMI 29.35 kg/m    Body mass index is 29.35 kg/m .  Physical Exam   GENERAL: healthy, alert and no distress  NECK: no adenopathy and thyroid normal to palpation  RESP: lungs clear to auscultation - no rales, rhonchi or wheezes  CV: regular rate and rhythm, no murmur, click or rub, no peripheral edema  ABDOMEN: soft, nontender, no masses and bowel sounds normal  MS: no gross musculoskeletal defects noted, no  edema  SKIN: Rash inner thigh resolving

## 2021-06-10 LAB — INR PPP: 2 (ref 0.9–1.1)

## 2021-07-02 DIAGNOSIS — I10 HTN, GOAL BELOW 140/90: ICD-10-CM

## 2021-07-06 ENCOUNTER — ANTICOAGULATION THERAPY VISIT (OUTPATIENT)
Dept: ANTICOAGULATION | Facility: CLINIC | Age: 86
End: 2021-07-06

## 2021-07-06 DIAGNOSIS — I48.91 ATRIAL FIBRILLATION (H): ICD-10-CM

## 2021-07-06 DIAGNOSIS — I48.20 CHRONIC ATRIAL FIBRILLATION (H): ICD-10-CM

## 2021-07-06 DIAGNOSIS — Z79.01 LONG TERM CURRENT USE OF ANTICOAGULANT THERAPY: ICD-10-CM

## 2021-07-06 NOTE — PROGRESS NOTES
Anticoagulation Management    Unable to reach Linwood today.    Follow up required to confirm warfarin dose taken and assess for changes. Patient was recently seen at [facility] for Hemoptysis. He was instructed to hold his Coumadin until 6/21/21. He saw a geriatric MD from [facility] on 6/21/21. Writer did not see further dosing instructions in the notes. Writer spoke with SAMIR Fung from [facility] INR clinic. They have not received notice that the patient is transferring to [facility] permanently. For questions may call the [facility] INR Clinic at [phone].    Left VM requesting a call back. Verify dose patient is taking. Schedule next INR. Is the patient going to continue with [facility] or transfer to [facility]?      Anticoagulation clinic to follow up    Elizabeth Marrero RN

## 2021-07-08 DIAGNOSIS — I10 HTN, GOAL BELOW 140/90: ICD-10-CM

## 2021-07-08 DIAGNOSIS — I10 BENIGN ESSENTIAL HYPERTENSION: ICD-10-CM

## 2021-07-08 RX ORDER — LISINOPRIL 20 MG/1
TABLET ORAL
Qty: 90 TABLET | Refills: 0 | OUTPATIENT
Start: 2021-07-08

## 2021-07-08 RX ORDER — METOPROLOL TARTRATE 25 MG/1
TABLET, FILM COATED ORAL
Qty: 90 TABLET | Refills: 1 | Status: CANCELLED | OUTPATIENT
Start: 2021-07-08

## 2021-07-08 RX ORDER — LISINOPRIL 20 MG/1
TABLET ORAL
Qty: 90 TABLET | Refills: 0 | Status: CANCELLED | OUTPATIENT
Start: 2021-07-08

## 2021-07-08 RX ORDER — AMLODIPINE BESYLATE 5 MG/1
10 TABLET ORAL DAILY
Qty: 90 TABLET | Refills: 0 | Status: CANCELLED | OUTPATIENT
Start: 2021-07-08

## 2021-07-08 RX ORDER — AMLODIPINE BESYLATE 5 MG/1
TABLET ORAL
Qty: 90 TABLET | Refills: 0 | OUTPATIENT
Start: 2021-07-08

## 2021-07-08 NOTE — TELEPHONE ENCOUNTER
Maynor from [facility] Tamiko Caldwell called as patient was using [facility] and no longer can per insurance needs all of his medications sent to [facility] but needs the amLODIPine (NORVASC) 5 MG tablet ASAP please.    There Phone # [phone] and Fax# [phone]  Evie Rawls,

## 2021-07-08 NOTE — PROGRESS NOTES
Patient states he is now seeing an [facility] provider and will be getting his INR's through her. Does not have an appointment set up yet. Advised to call and schedule an INR by the end of next week, sooner the better. Patient verbalized understanding    Layla Garcia, SAMIR - [facility]

## 2021-07-09 DIAGNOSIS — I10 HTN, GOAL BELOW 140/90: ICD-10-CM

## 2021-07-09 RX ORDER — AMLODIPINE BESYLATE 5 MG/1
10 TABLET ORAL DAILY
Qty: 180 TABLET | Refills: 0 | Status: SHIPPED | OUTPATIENT
Start: 2021-07-09

## 2021-07-09 RX ORDER — AMLODIPINE BESYLATE 5 MG/1
10 TABLET ORAL DAILY
Qty: 180 TABLET | Refills: 0 | Status: CANCELLED | OUTPATIENT
Start: 2021-07-09

## 2021-07-09 NOTE — TELEPHONE ENCOUNTER
[facility] pharmacy called the clinic requesting refills for patient to be sent as soon as possible.  Patient has 1 dose left so far.    Called and spoke with patient.  He confirmed that he has establish care with a new provider.   Patient was seen for the Medicare Wellness visit on 6/21/21.    PARAMJIT East (Attending)  [phone] (Work)  [phone] (Fax)  [address] [place] [address]  Geriatric Medicine - Internal Medicine

## 2021-07-09 NOTE — TELEPHONE ENCOUNTER
Routing refill request to provider for review/approval because:    Requested Prescriptions   Pending Prescriptions Disp Refills     amLODIPine (NORVASC) 5 MG tablet 180 tablet 0     Sig: Take 2 tablets (10 mg) by mouth daily For HTN       There is no refill protocol information for this order        Mely Matias RN on 7/9/2021 at 1:23 PM

## 2021-07-29 DIAGNOSIS — R80.9 ALBUMINURIA: Primary | ICD-10-CM

## 2021-07-29 DIAGNOSIS — N18.30 CHRONIC RENAL FAILURE, STAGE 3 (MODERATE), UNSPECIFIED WHETHER STAGE 3A OR 3B CKD (H): ICD-10-CM

## 2021-07-29 NOTE — PROGRESS NOTES
Labs ordered per standing order protocol for upcoming appointment. Patient will be notified to get labs done within 1 week prior to appointment.

## 2021-08-30 ENCOUNTER — TELEPHONE (OUTPATIENT)
Dept: GASTROENTEROLOGY | Facility: CLINIC | Age: 86
End: 2021-08-30

## 2021-08-30 DIAGNOSIS — N18.4 CKD (CHRONIC KIDNEY DISEASE) STAGE 4, GFR 15-29 ML/MIN (H): Primary | ICD-10-CM

## 2021-08-30 NOTE — TELEPHONE ENCOUNTER
Called patient to inform him of labs requested by Dr. Bustamante. He requests they be faxed to [facility].    Fax # [phone]  Clinic # [phone]    Faxed at 3:59 PM.

## 2021-09-08 ENCOUNTER — VIRTUAL VISIT (OUTPATIENT)
Dept: NEPHROLOGY | Facility: CLINIC | Age: 86
End: 2021-09-08
Attending: FAMILY MEDICINE
Payer: COMMERCIAL

## 2021-09-08 VITALS — HEIGHT: 64 IN | WEIGHT: 160 LBS | BODY MASS INDEX: 27.31 KG/M2

## 2021-09-08 DIAGNOSIS — D63.1 ANEMIA IN STAGE 3B CHRONIC KIDNEY DISEASE (H): Primary | ICD-10-CM

## 2021-09-08 DIAGNOSIS — N18.32 CHRONIC KIDNEY DISEASE, STAGE 3B (H): ICD-10-CM

## 2021-09-08 DIAGNOSIS — R80.9 NEPHROTIC RANGE PROTEINURIA: ICD-10-CM

## 2021-09-08 DIAGNOSIS — R80.9 ALBUMINURIA: ICD-10-CM

## 2021-09-08 DIAGNOSIS — N18.32 ANEMIA IN STAGE 3B CHRONIC KIDNEY DISEASE (H): Primary | ICD-10-CM

## 2021-09-08 DIAGNOSIS — R04.2 HEMOPTYSIS: ICD-10-CM

## 2021-09-08 DIAGNOSIS — E08.9 DIABETES MELLITUS DUE TO UNDERLYING CONDITION WITHOUT COMPLICATION, WITHOUT LONG-TERM CURRENT USE OF INSULIN (H): ICD-10-CM

## 2021-09-08 DIAGNOSIS — E11.21 TYPE 2 DIABETES MELLITUS WITH DIABETIC NEPHROPATHY, WITHOUT LONG-TERM CURRENT USE OF INSULIN (H): ICD-10-CM

## 2021-09-08 PROCEDURE — 99204 OFFICE O/P NEW MOD 45 MIN: CPT | Mod: 95 | Performed by: INTERNAL MEDICINE

## 2021-09-08 ASSESSMENT — MIFFLIN-ST. JEOR: SCORE: 1291.76

## 2021-09-08 ASSESSMENT — PAIN SCALES - GENERAL: PAINLEVEL: NO PAIN (0)

## 2021-09-08 NOTE — NURSING NOTE
"Chief Complaint   Patient presents with     Albuminuria       Vitals:    09/08/21 1218   Weight: 72.6 kg (160 lb)   Height: 1.626 m (5' 4\")       Body mass index is 27.46 kg/m .     Roberta Conway MA  "

## 2021-09-08 NOTE — PROGRESS NOTES
Linwood is a 89 year old who is being evaluated via a billable telephone visit.      What phone number would you like to be contacted at? [phone]   How would you like to obtain your AVS? Shruthikaylenesissy  Phone call duration: 13 minutes  Total time 35 minutes on day of service reviewing records    Assessment and Plan:  89 year old male with history of diabetes, hypertension, AAA s/p repair, atrial fibrillation, on coumadin who presents for evalautin of CKD and proteinuria  # CKD with significant proteinuria:  His Scr has fluctuated between 1-1.3 but is relatively stable, however he has proteinuria. It was 3 grams over then years ago then was improved then recently about 7 grams improved to 3.8 grams a few months ago  - his BP was quite high and may be the cause of the proteinuria  - his diabetes is well controlled, not sure if he has diabetic nephropathy purely  - will check PLA2R if proteinuria not improving with better blood pressure control  - check cystatin C  - will have him come in person next visit  - had hemoptysis (on coumadin ) and diagnosed with bronchitis, will check ANCA    # Hypertension: blood pressure was high in the Spring, Dr Canchola has followed up on this and last BP was 134/66  He denies dizziness, but not checking BP  - on amlodipine , metoprolol 25mg and lisinopril 20mg- would increase to 40mg if creatinine stable on recheck   - he will check BP and return to clinic in a month or so with labs    # Anemia in chronic renal disease:   - Hgb: mild anemia, hgb 11.9    # Electrolytes:   - Potassium; level: Normal  - Bicarbonate; level: Low- will need to consider bicarbonate supplement if remains low on recheck    # Mineral Bone Disorder:   - Calcium; level is:  Normal     # Hemoptysis- likely due to infection and being on coumadin, will check ANCA    Plan:  - recheck labs  - increase lisinpril if creatinine stable and proteinuria still elevated  - in person  - followup on BP      Assessment and plan was  discussed with patient and he voiced his understanding and agreement.    Consult:  Linwood Chi was seen in consultation at the request of Dr. Canchola for proteinuria.    Reason for Visit:  Linwood Chi is a 89 year old male with CKD and proteinuria, who presents for evaluation.    HPI:  He is a pleasant male with history of hypertension, AAA s/p repair in 2019, diabetes for many years (?>10 years) who is referred for evaluation of kidney function and proteinuria.   He had albuminuria of 3 grams noted back in 2011 and has been elevated since then, but recnetly noted up to 7 grams in Spring 2021 with last check at 3.8 grams.  He is not aware of any recent illnesses, skin rash, shortness of breath or other new symptoms. He feels well and in fact was driving when I called him for his appointment, on his way to the gym.   He denies NSAID use or other OTC medications.     Renal History:   CKD and proteinuria  HTN  AAA  DM    ROS:   A comprehensive review of systems was obtained and negative, except as noted in the HPI or PMH.    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 kidney disease     DM (diabetes mellitus), type 2 with renal complications (H)     A-fib (H)     Osteoarthritis of left knee     Onychomycosis     Long-term (current) use of anticoagulants [Z79.01]     Atrial fibrillation (HCC) [I48.91]     Benign essential hypertension     Gastroesophageal reflux disease without esophagitis     Type 2 diabetes mellitus with diabetic nephropathy, without long-term current use of insulin (H)     Albuminuria     Chronic atrial fibrillation (H)     Abdominal aortic  aneurysm (AAA) without rupture (H)     PMH:   Medical record was reviewed and PMH was discussed with patient and noted below.  Past Medical History:   Diagnosis Date     CAD (coronary artery disease)      History of tobacco use     Smoked x 50 years, Quit      Hyperlipidemia LDL goal < 100      Hypertension goal BP (blood pressure) < 130/80      Rectal bleeding 10/18/2011    Admit[facility].     Type 2 diabetes, HbA1c goal < 7% (H)      PSH:   Past Surgical History:   Procedure Laterality Date     BYPASS GRAFT ARTERY CORONARY      3 vessel      left eye cataract Left      OPEN REDUCTION INTERNAL FIXATION PATELLA         Family Hx:   Family History   Problem Relation Age of Onset     Asthma Father      Diabetes Paternal Grandmother      Cerebrovascular Disease Brother      Hypertension Brother      Diabetes Sister      Psychotic Disorder Sister      Personal Hx:   Social History     Tobacco Use     Smoking status: Former Smoker     Years: 50.00     Types: Cigarettes     Quit date: 3/4/1992     Years since quittin.5     Smokeless tobacco: Former User   Substance Use Topics     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        Allergies:  Allergies   Allergen Reactions     Crestor [Rosuvastatin] Cramps     Pcn [Penicillins]      Swelling        Medications:  Current Outpatient Medications   Medication Sig     acetaminophen (TYLENOL) 500 MG tablet Take 1-2 tablets (500-1,000 mg) by mouth every 8 hours as needed for mild pain ((no more than 3000 mg in 24 hours))     amLODIPine (NORVASC) 5 MG tablet Take 2 tablets (10 mg) by mouth daily For HTN     atorvastatin (LIPITOR) 40 MG tablet Take 1 tablet (40 mg) by mouth daily     clotrimazole-betamethasone (LOTRISONE) 1-0.05 % external cream (Apply to affected area for 7 -10 days)     dorzolamide-timolol (COSOPT) 2-0.5 % ophthalmic solution Place 1 drop Into the left eye 2 times daily     latanoprost (XALATAN) 0.005 % ophthalmic solution Place 1 drop  "Into the left eye daily     lidocaine (XYLOCAINE) 2 % external gel Apply topically 3 times daily     lisinopril (ZESTRIL) 20 MG tablet TAKE 1 TABLET BY MOUTH DAILY     metFORMIN (GLUCOPHAGE) 500 MG tablet TAKE 1 TABLET BY MOUTH TWICE A DAY (Patient taking differently: Taking  1/2 TABLET BY MOUTH TWICE A DAY)     metoprolol tartrate (LOPRESSOR) 25 MG tablet Take one a day     omeprazole (PRILOSEC) 20 MG DR capsule TAKE 1 CAPSULE BY MOUTH EVERY DAY 30 TO 60 MINUTES BEFORE A MEAL     warfarin ANTICOAGULANT (COUMADIN) 2.5 MG tablet TAKE 1 TABLET BY MOUTH DAILY OR AS DIRECTED BY INR NURSE     No current facility-administered medications for this visit.      Vitals:  Ht 1.626 m (5' 4\")   Wt 72.6 kg (160 lb)   BMI 27.46 kg/m      Exam:  General: alert, oriented, conversant  Speaks in full sentences without audible dyspnea or wheeze  Neuro: normal speech  Psych: conversant, normal affect and thought process    LABS:   CMP  Recent Labs   Lab Test 05/04/21  1140 09/16/20  1014 09/09/19  1336 02/06/19  1452    134 137 137   POTASSIUM 4.4 4.0 4.2 4.2   CHLORIDE 107 100 103 104   CO2 27 27 25 26   ANIONGAP 3 7 9 7   * 91 91 99   BUN 30 26 17 11   CR 1.45* 1.14 1.05 0.92   GFRESTIMATED 42* 56* 63 74   GFRESTBLACK 48* 65 72 86   HANS 8.8 8.7 8.9 8.8     Recent Labs   Lab Test 09/09/19  1336 02/12/18  1406 12/26/16  0929 08/31/15  1546 09/04/14  1428   BILITOTAL  --  0.6  --  0.7 0.6   ALKPHOS  --  100  --  90 85   ALT 19 30 23 25 22   AST  --  16  --  16 19     CBC  Recent Labs   Lab Test 02/06/19  1452 11/20/18  1042 05/08/18  1528 04/17/17  0952 07/22/16  1309   HGB 12.8* 12.3* 13.4 14.6 14.0   WBC  --  6.9 7.7 12.3* 8.2   RBC  --  4.04* 4.44 4.68 4.45   HCT  --  37.4* 40.9 42.6 40.8   MCV  --  93 92 91 92   MCH  --  30.4 30.2 31.2 31.5   MCHC  --  32.9 32.8 34.3 34.3   RDW  --  16.2* 16.9* 15.9* 16.0*   PLT  --  293 260 247 260     URINE STUDIES  No lab results found.  No lab results found.  PTH  No lab results " found.  IRON STUDIES  No lab results found.      Deja Bustamante MD

## 2021-09-08 NOTE — PATIENT INSTRUCTIONS
It was a pleasure taking care of you today.  I've included a brief summary of our discussion and care plan from today's visit below.  Please review this information with your primary care provider.  _______________________________________________________________________    My recommendations are summarized as follows:  Monitor blood pressure  We might increase your lisinopril dose to help with protein spilling in the urine  We will see you in person at [facility]    To schedule imaging please call [phone]     To schedule your lab appointment at [facility] 1st floor lab call [phone]      Return to Nephrology Clinic in 1 month to review your progress.    _______________________________________________________________________    Who do I call with any questions after my visit?    Please be in touch if there are any further questions that arise following today's visit.  There are multiple ways to contact your nephrology care team.      During business hours, you may reach your Nephrology RN Care Coordinator, Margo, at [phone].      To schedule or reschedule an appointment, please call [phone].    You can always send a secure message through [employer].  [employer] messages are answered by your nurse or doctor typically within 24 hours.  Please allow extra time on weekends and holidays.      For urgent/emergent questions after business hours, you may reach the on-call Nephrology Fellow by contacting the [facility] at [phone].     How will I get the results of any tests ordered?    You will receive all of your results.  If you have signed up for [employer], any tests ordered at your visit will be available to you after your physician reviews them.  Typically this takes 1-2 weeks.  If there are urgent results that require a change in your care plan, your physician or nurse will call you to discuss the next steps.      What is MyChart?  [employer] is a secure way for you to  access all of your healthcare records from the [facility].  It is a web based computer program, so you can sign on to it from any location.  It also allows you to send secure messages to your care team.  I recommend signing up for [employer] access if you have not already done so and are comfortable with using a computer.      How do I schedule a follow-up visit?  If you did not schedule a follow-up visit today, please call [phone] to schedule a follow-up office visit.        Sincerely,    Deja Bustamante MD  [facility]  Division of Nephrology and Hypertension

## 2021-10-11 ENCOUNTER — OFFICE VISIT (OUTPATIENT)
Dept: NEPHROLOGY | Facility: CLINIC | Age: 86
End: 2021-10-11
Payer: COMMERCIAL

## 2021-10-11 VITALS
DIASTOLIC BLOOD PRESSURE: 86 MMHG | SYSTOLIC BLOOD PRESSURE: 185 MMHG | BODY MASS INDEX: 29.19 KG/M2 | WEIGHT: 171 LBS | HEART RATE: 85 BPM | HEIGHT: 64 IN | OXYGEN SATURATION: 95 %

## 2021-10-11 DIAGNOSIS — R32 URINARY INCONTINENCE, UNSPECIFIED TYPE: ICD-10-CM

## 2021-10-11 DIAGNOSIS — R04.2 HEMOPTYSIS: ICD-10-CM

## 2021-10-11 DIAGNOSIS — N18.31 STAGE 3A CHRONIC KIDNEY DISEASE (H): Primary | ICD-10-CM

## 2021-10-11 DIAGNOSIS — R80.9 ALBUMINURIA: ICD-10-CM

## 2021-10-11 DIAGNOSIS — I10 HYPERTENSION, ESSENTIAL: ICD-10-CM

## 2021-10-11 PROCEDURE — 99215 OFFICE O/P EST HI 40 MIN: CPT | Performed by: PHYSICIAN ASSISTANT

## 2021-10-11 ASSESSMENT — MIFFLIN-ST. JEOR: SCORE: 1341.65

## 2021-10-11 NOTE — PROGRESS NOTES
NEPHROLOGY PROGRESS NOTE  10/11/21    Assessment and Plan:  89 year old male with history of diabetes, hypertension, AAA s/p repair, atrial fibrillation, on coumadin who presents for evalautin of CKD and proteinuria. Last seen in nephrology on 9/8/21 with Dr. Bustamante.  # CKD with significant proteinuria:  His Scr has fluctuated between 1-1.3 but is relatively stable, however he has proteinuria. It was 3 grams over then years ago then was improved then recently about 7 grams improved to 3.8 grams a few months ago  - his BP was quite high and may be the cause of the proteinuria  - his diabetes is well controlled, not sure if he has diabetic nephropathy purely  - will check PLA2R if proteinuria not improving with better blood pressure control  - check cystatin C  - had hemoptysis (on coumadin ) and diagnosed with bronchitis, will check ANCA    # Hypertension: blood pressure was high in the Spring, Dr Canchola has followed up on this and last BP was 134/66  He denies dizziness. BP at home averaging 150's systolic with occasional 170's or 180's noted.   - on amlodipine , metoprolol 25mg and lisinopril 20mg- would increase to 40mg if creatinine stable on recheck   - labs today    # Anemia in chronic renal disease:   - recent Hgb: mild anemia, hgb 11.9    # Electrolytes/Acid/Base:   - Potassium; level: Normal  - Bicarbonate; level: Low- will need to consider bicarbonate supplement if remains low on recheck  - check renal panel    # Mineral Bone Disorder:   - Calcium; level is:  Normal     # Hemoptysis- likely due to infection and being on coumadin, will check ANCA    Plan:  - recheck labs  - increase lisinpril if creatinine stable and proteinuria still elevated  - obtain renal US and refer to urology for evaluation of incontinence      Assessment and plan was discussed with patient and he voiced his understanding and agreement.      Reason for Visit:  Linwood Chi is a 89 year old male with CKD and proteinuria, who  presents for follow up.    HPI:  He is a pleasant male with history of hypertension, AAA s/p repair in 2019, diabetes for many years (?>10 years) who is referred for evaluation of kidney function and proteinuria.   He had albuminuria of 3 grams noted back in 2011 and has been elevated since then, but recnetly noted up to 7 grams in Spring 2021 with last check at 3.8 grams.  He is not aware of any recent illnesses, skin rash, shortness of breath or other new symptoms. He does have a persistent cough and was evaluated with CXR in August which was negative acute. He also has noted urinary incontinence in the past couple of months. Often it happens before he has the urge to void. He has no fevers, chills, dysuria or blood in the urine. He has no swelling in lower extremities.  He denies NSAID use or other OTC medications.     Renal History:   CKD and proteinuria  HTN  AAA  DM    ROS:   A comprehensive review of systems was obtained and negative, except as noted in the HPI or PMH.    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     DM (diabetes mellitus), type 2 with renal complications (H)     A-fib (H)     Osteoarthritis of left knee     Onychomycosis     Long-term (current) use of anticoagulants [Z79.01]     Atrial fibrillation (HCC) [I48.91]     Benign essential hypertension     Gastroesophageal reflux disease without esophagitis     Type 2 diabetes mellitus with diabetic nephropathy, without long-term current use of insulin (H)     Albuminuria     Chronic atrial fibrillation (H)     Abdominal aortic aneurysm (AAA) without rupture (H)     PMH:   Medical  record was reviewed and PMH was discussed with patient and noted below.  Past Medical History:   Diagnosis Date     CAD (coronary artery disease)      History of tobacco use     Smoked x 50 years, Quit      Hyperlipidemia LDL goal < 100      Hypertension goal BP (blood pressure) < 130/80      Rectal bleeding 10/18/2011    Admit-[facility].     Type 2 diabetes, HbA1c goal < 7% (H)      PSH:   Past Surgical History:   Procedure Laterality Date     BYPASS GRAFT ARTERY CORONARY      3 vessel      left eye cataract Left      OPEN REDUCTION INTERNAL FIXATION PATELLA         Family Hx:   Family History   Problem Relation Age of Onset     Asthma Father      Diabetes Paternal Grandmother      Cerebrovascular Disease Brother      Hypertension Brother      Diabetes Sister      Psychotic Disorder Sister      Personal Hx:   Social History     Tobacco Use     Smoking status: Former Smoker     Years: 50.00     Types: Cigarettes     Quit date: 3/4/1992     Years since quittin.6     Smokeless tobacco: Former User   Substance Use Topics     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        Allergies:  Allergies   Allergen Reactions     Crestor [Rosuvastatin] Cramps     Pcn [Penicillins]      Swelling        Medications:  Current Outpatient Medications   Medication Sig     acetaminophen (TYLENOL) 500 MG tablet Take 1-2 tablets (500-1,000 mg) by mouth every 8 hours as needed for mild pain ((no more than 3000 mg in 24 hours))     amLODIPine (NORVASC) 5 MG tablet Take 2 tablets (10 mg) by mouth daily For HTN     atorvastatin (LIPITOR) 40 MG tablet Take 1 tablet (40 mg) by mouth daily     clotrimazole-betamethasone (LOTRISONE) 1-0.05 % external cream (Apply to affected area for 7 -10 days)     dorzolamide-timolol (COSOPT) 2-0.5 % ophthalmic solution Place 1 drop Into the left eye 2 times daily     latanoprost (XALATAN) 0.005 % ophthalmic solution Place 1 drop Into the left eye daily     lidocaine (XYLOCAINE) 2 %  "external gel Apply topically 3 times daily     lisinopril (ZESTRIL) 20 MG tablet TAKE 1 TABLET BY MOUTH DAILY     metFORMIN (GLUCOPHAGE) 500 MG tablet Taking  1/2 TABLET BY MOUTH TWICE A DAY     metoprolol tartrate (LOPRESSOR) 25 MG tablet Take one a day     omeprazole (PRILOSEC) 20 MG DR capsule TAKE 1 CAPSULE BY MOUTH EVERY DAY 30 TO 60 MINUTES BEFORE A MEAL     warfarin ANTICOAGULANT (COUMADIN) 2.5 MG tablet TAKE 1 TABLET BY MOUTH DAILY OR AS DIRECTED BY INR NURSE     No current facility-administered medications for this visit.      Vitals:  BP (!) 185/86 (BP Location: Right arm, Patient Position: Sitting, Cuff Size: Adult Regular)   Pulse 85   Ht 1.626 m (5' 4\")   Wt 77.6 kg (171 lb)   SpO2 95%   BMI 29.35 kg/m      Exam:  General: alert, oriented, conversant  Speaks in full sentences without audible dyspnea or wheeze  Neuro: normal speech  Psych: conversant, normal affect and thought process  Heart: RRR  Lungs: course breath sounds noted, no wheezing  Extremities: no lower extremity edema noted    LABS:   CMP  Recent Labs   Lab Test 05/04/21  1140 09/16/20  1014 09/09/19  1336 02/06/19  1452    134 137 137   POTASSIUM 4.4 4.0 4.2 4.2   CHLORIDE 107 100 103 104   CO2 27 27 25 26   ANIONGAP 3 7 9 7   * 91 91 99   BUN 30 26 17 11   CR 1.45* 1.14 1.05 0.92   GFRESTIMATED 42* 56* 63 74   GFRESTBLACK 48* 65 72 86   HANS 8.8 8.7 8.9 8.8     Recent Labs   Lab Test 09/09/19  1336 02/12/18  1406 12/26/16  0929 08/31/15  1546 09/04/14  1428 09/04/14  1428   BILITOTAL  --  0.6  --  0.7  --  0.6   ALKPHOS  --  100  --  90  --  85   ALT 19 30 23 25   < > 22   AST  --  16  --  16  --  19    < > = values in this interval not displayed.     CBC  Recent Labs   Lab Test 02/06/19  1452 11/20/18  1042 05/08/18  1528 04/17/17  0952 07/22/16  1309 07/22/16  1309   HGB 12.8* 12.3* 13.4 14.6   < > 14.0   WBC  --  6.9 7.7 12.3*  --  8.2   RBC  --  4.04* 4.44 4.68  --  4.45   HCT  --  37.4* 40.9 42.6  --  40.8   MCV  --  " 93 92 91  --  92   MCH  --  30.4 30.2 31.2  --  31.5   MCHC  --  32.9 32.8 34.3  --  34.3   RDW  --  16.2* 16.9* 15.9*  --  16.0*   PLT  --  293 260 247  --  260    < > = values in this interval not displayed.     URINE STUDIES  No lab results found.  No lab results found.  PTH  No lab results found.  IRON STUDIES  No lab results found.      Anita Mendoza PA-C    Face to face time 20 minutes. An additional 20 minutes was spent on date of service in chart review, documentation and other activities as noted.

## 2021-10-11 NOTE — PATIENT INSTRUCTIONS
1. Labs today, schedule kidney and bladder ultrasound.   2. Referral to urology placed.   3. Continue to monitor blood pressure and weight at home.   4. Increase hydration with water.   5. Follow up with Dr. Bustamante in 4 months. Call sooner with any questions or concerns.

## 2021-10-11 NOTE — LETTER
10/11/2021       RE: Linwood Chi  [address] [address] [address]     Dear Colleague,    Thank you for referring your patient, Linwood Chi, to the [facility] at [facility]. Please see a copy of my visit note below.    NEPHROLOGY PROGRESS NOTE  10/11/21    Assessment and Plan:  89 year old male with history of diabetes, hypertension, AAA s/p repair, atrial fibrillation, on coumadin who presents for evalautin of CKD and proteinuria. Last seen in nephrology on 9/8/21 with Dr. Bustamante.  # CKD with significant proteinuria:  His Scr has fluctuated between 1-1.3 but is relatively stable, however he has proteinuria. It was 3 grams over then years ago then was improved then recently about 7 grams improved to 3.8 grams a few months ago  - his BP was quite high and may be the cause of the proteinuria  - his diabetes is well controlled, not sure if he has diabetic nephropathy purely  - will check PLA2R if proteinuria not improving with better blood pressure control  - check cystatin C  - had hemoptysis (on coumadin ) and diagnosed with bronchitis, will check ANCA    # Hypertension: blood pressure was high in the Spring, Dr Canchola has followed up on this and last BP was 134/66  He denies dizziness. BP at home averaging 150's systolic with occasional 170's or 180's noted.   - on amlodipine , metoprolol 25mg and lisinopril 20mg- would increase to 40mg if creatinine stable on recheck   - labs today    # Anemia in chronic renal disease:   - recent Hgb: mild anemia, hgb 11.9    # Electrolytes/Acid/Base:   - Potassium; level: Normal  - Bicarbonate; level: Low- will need to consider bicarbonate supplement if remains low on recheck  - check renal panel    # Mineral Bone Disorder:   - Calcium; level is:  Normal     # Hemoptysis- likely due to infection and being on coumadin, will check ANCA    Plan:  - recheck labs  - increase lisinpril  if creatinine stable and proteinuria still elevated  - obtain renal US and refer to urology for evaluation of incontinence      Assessment and plan was discussed with patient and he voiced his understanding and agreement.      Reason for Visit:  Linwood Chi is a 89 year old male with CKD and proteinuria, who presents for follow up.    HPI:  He is a pleasant male with history of hypertension, AAA s/p repair in 2019, diabetes for many years (?>10 years) who is referred for evaluation of kidney function and proteinuria.   He had albuminuria of 3 grams noted back in 2011 and has been elevated since then, but recnetly noted up to 7 grams in Spring 2021 with last check at 3.8 grams.  He is not aware of any recent illnesses, skin rash, shortness of breath or other new symptoms. He does have a persistent cough and was evaluated with CXR in August which was negative acute. He also has noted urinary incontinence in the past couple of months. Often it happens before he has the urge to void. He has no fevers, chills, dysuria or blood in the urine. He has no swelling in lower extremities.  He denies NSAID use or other OTC medications.     Renal History:   CKD and proteinuria  HTN  AAA  DM    ROS:   A comprehensive review of systems was obtained and negative, except as noted in the HPI or PMH.    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     DM (diabetes mellitus), type 2 with renal complications (H)     A-fib (H)     Osteoarthritis of left knee     Onychomycosis     Long-term (current) use of anticoagulants [Z79.01]     Atrial fibrillation (HCC) [I48.91]      Benign essential hypertension     Gastroesophageal reflux disease without esophagitis     Type 2 diabetes mellitus with diabetic nephropathy, without long-term current use of insulin (H)     Albuminuria     Chronic atrial fibrillation (H)     Abdominal aortic aneurysm (AAA) without rupture (H)     PMH:   Medical record was reviewed and PMH was discussed with patient and noted below.  Past Medical History:   Diagnosis Date     CAD (coronary artery disease)      History of tobacco use     Smoked x 50 years, Quit      Hyperlipidemia LDL goal < 100      Hypertension goal BP (blood pressure) < 130/80      Rectal bleeding 10/18/2011    Admit-[facility].     Type 2 diabetes, HbA1c goal < 7% (H)      PSH:   Past Surgical History:   Procedure Laterality Date     BYPASS GRAFT ARTERY CORONARY      3 vessel      left eye cataract Left      OPEN REDUCTION INTERNAL FIXATION PATELLA         Family Hx:   Family History   Problem Relation Age of Onset     Asthma Father      Diabetes Paternal Grandmother      Cerebrovascular Disease Brother      Hypertension Brother      Diabetes Sister      Psychotic Disorder Sister      Personal Hx:   Social History     Tobacco Use     Smoking status: Former Smoker     Years: 50.00     Types: Cigarettes     Quit date: 3/4/1992     Years since quittin.6     Smokeless tobacco: Former User   Substance Use Topics     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        Allergies:  Allergies   Allergen Reactions     Crestor [Rosuvastatin] Cramps     Pcn [Penicillins]      Swelling        Medications:  Current Outpatient Medications   Medication Sig     acetaminophen (TYLENOL) 500 MG tablet Take 1-2 tablets (500-1,000 mg) by mouth every 8 hours as needed for mild pain ((no more than 3000 mg in 24 hours))     amLODIPine (NORVASC) 5 MG tablet Take 2 tablets (10 mg) by mouth daily For HTN     atorvastatin (LIPITOR) 40 MG tablet Take 1 tablet (40 mg) by mouth daily      "clotrimazole-betamethasone (LOTRISONE) 1-0.05 % external cream (Apply to affected area for 7 -10 days)     dorzolamide-timolol (COSOPT) 2-0.5 % ophthalmic solution Place 1 drop Into the left eye 2 times daily     latanoprost (XALATAN) 0.005 % ophthalmic solution Place 1 drop Into the left eye daily     lidocaine (XYLOCAINE) 2 % external gel Apply topically 3 times daily     lisinopril (ZESTRIL) 20 MG tablet TAKE 1 TABLET BY MOUTH DAILY     metFORMIN (GLUCOPHAGE) 500 MG tablet Taking  1/2 TABLET BY MOUTH TWICE A DAY     metoprolol tartrate (LOPRESSOR) 25 MG tablet Take one a day     omeprazole (PRILOSEC) 20 MG DR capsule TAKE 1 CAPSULE BY MOUTH EVERY DAY 30 TO 60 MINUTES BEFORE A MEAL     warfarin ANTICOAGULANT (COUMADIN) 2.5 MG tablet TAKE 1 TABLET BY MOUTH DAILY OR AS DIRECTED BY INR NURSE     No current facility-administered medications for this visit.      Vitals:  BP (!) 185/86 (BP Location: Right arm, Patient Position: Sitting, Cuff Size: Adult Regular)   Pulse 85   Ht 1.626 m (5' 4\")   Wt 77.6 kg (171 lb)   SpO2 95%   BMI 29.35 kg/m      Exam:  General: alert, oriented, conversant  Speaks in full sentences without audible dyspnea or wheeze  Neuro: normal speech  Psych: conversant, normal affect and thought process  Heart: RRR  Lungs: course breath sounds noted, no wheezing  Extremities: no lower extremity edema noted    LABS:   CMP  Recent Labs   Lab Test 05/04/21  1140 09/16/20  1014 09/09/19  1336 02/06/19  1452    134 137 137   POTASSIUM 4.4 4.0 4.2 4.2   CHLORIDE 107 100 103 104   CO2 27 27 25 26   ANIONGAP 3 7 9 7   * 91 91 99   BUN 30 26 17 11   CR 1.45* 1.14 1.05 0.92   GFRESTIMATED 42* 56* 63 74   GFRESTBLACK 48* 65 72 86   HANS 8.8 8.7 8.9 8.8     Recent Labs   Lab Test 09/09/19  1336 02/12/18  1406 12/26/16  0929 08/31/15  1546 09/04/14  1428 09/04/14  1428   BILITOTAL  --  0.6  --  0.7  --  0.6   ALKPHOS  --  100  --  90  --  85   ALT 19 30 23 25   < > 22   AST  --  16  --  16  --  " 19    < > = values in this interval not displayed.     CBC  Recent Labs   Lab Test 02/06/19  1452 11/20/18  1042 05/08/18  1528 04/17/17  0952 07/22/16  1309 07/22/16  1309   HGB 12.8* 12.3* 13.4 14.6   < > 14.0   WBC  --  6.9 7.7 12.3*  --  8.2   RBC  --  4.04* 4.44 4.68  --  4.45   HCT  --  37.4* 40.9 42.6  --  40.8   MCV  --  93 92 91  --  92   MCH  --  30.4 30.2 31.2  --  31.5   MCHC  --  32.9 32.8 34.3  --  34.3   RDW  --  16.2* 16.9* 15.9*  --  16.0*   PLT  --  293 260 247  --  260    < > = values in this interval not displayed.     URINE STUDIES  No lab results found.  No lab results found.  PTH  No lab results found.  IRON STUDIES  No lab results found.      Rachel Mendoza PA-C    Face to face time 20 minutes. An additional 20 minutes was spent on date of service in chart review, documentation and other activities as noted.       Again, thank you for allowing me to participate in the care of your patient.      Sincerely,    RACHEL ANDREA PA-C

## 2021-10-20 ENCOUNTER — ANCILLARY PROCEDURE (OUTPATIENT)
Dept: ULTRASOUND IMAGING | Facility: CLINIC | Age: 86
End: 2021-10-20
Attending: PHYSICIAN ASSISTANT
Payer: COMMERCIAL

## 2021-10-20 DIAGNOSIS — N18.31 STAGE 3A CHRONIC KIDNEY DISEASE (H): ICD-10-CM

## 2021-10-20 DIAGNOSIS — R32 URINARY INCONTINENCE, UNSPECIFIED TYPE: ICD-10-CM

## 2021-10-20 PROCEDURE — 76770 US EXAM ABDO BACK WALL COMP: CPT | Performed by: RADIOLOGY

## 2021-11-16 ENCOUNTER — OFFICE VISIT (OUTPATIENT)
Dept: UROLOGY | Facility: CLINIC | Age: 86
End: 2021-11-16
Attending: PHYSICIAN ASSISTANT
Payer: COMMERCIAL

## 2021-11-16 VITALS
HEART RATE: 69 BPM | SYSTOLIC BLOOD PRESSURE: 167 MMHG | OXYGEN SATURATION: 94 % | RESPIRATION RATE: 16 BRPM | DIASTOLIC BLOOD PRESSURE: 84 MMHG

## 2021-11-16 DIAGNOSIS — R35.0 FREQUENCY OF MICTURITION: ICD-10-CM

## 2021-11-16 DIAGNOSIS — I15.9 SECONDARY HYPERTENSION: Primary | ICD-10-CM

## 2021-11-16 LAB
ALBUMIN UR-MCNC: >=300 MG/DL
APPEARANCE UR: CLEAR
BACTERIA #/AREA URNS HPF: ABNORMAL /HPF
BILIRUB UR QL STRIP: NEGATIVE
COLOR UR AUTO: YELLOW
GLUCOSE UR STRIP-MCNC: NEGATIVE MG/DL
HGB UR QL STRIP: ABNORMAL
KETONES UR STRIP-MCNC: NEGATIVE MG/DL
LEUKOCYTE ESTERASE UR QL STRIP: NEGATIVE
NITRATE UR QL: NEGATIVE
PH UR STRIP: 7 [PH] (ref 5–7)
RBC #/AREA URNS AUTO: ABNORMAL /HPF
SP GR UR STRIP: 1.02 (ref 1–1.03)
UROBILINOGEN UR STRIP-ACNC: 0.2 E.U./DL
WBC #/AREA URNS AUTO: ABNORMAL /HPF

## 2021-11-16 PROCEDURE — 51798 US URINE CAPACITY MEASURE: CPT | Performed by: UROLOGY

## 2021-11-16 PROCEDURE — 81001 URINALYSIS AUTO W/SCOPE: CPT | Performed by: UROLOGY

## 2021-11-16 PROCEDURE — 99203 OFFICE O/P NEW LOW 30 MIN: CPT | Mod: 25 | Performed by: UROLOGY

## 2021-11-16 NOTE — PROGRESS NOTES
3-4 coffee  3-4 water  3-4 milk  Bladder scan performed 0ml detected in bladder. Brianne Bear, CMA

## 2021-11-16 NOTE — LETTER
November 16, 2021      Linwood Chi  [address] [address]  [place] [address]        Dear ,    We are writing to inform you of your test results.    Your urine test is normal.     Resulted Orders   UA reflex to Microscopic and Culture [[id number]]   Result Value Ref Range    Color Urine Yellow Colorless, Straw, Light Yellow, Yellow    Appearance Urine Clear Clear    Glucose Urine Negative Negative mg/dL    Bilirubin Urine Negative Negative    Ketones Urine Negative Negative mg/dL    Specific Gravity Urine 1.025 1.003 - 1.035    Blood Urine Trace (A) Negative    pH Urine 7.0 5.0 - 7.0    Protein Albumin Urine >=300 (A) Negative mg/dL    Urobilinogen Urine 0.2 0.2, 1.0 E.U./dL    Nitrite Urine Negative Negative    Leukocyte Esterase Urine Negative Negative   Urine Microscopic   Result Value Ref Range    Bacteria Urine Few (A) None Seen /HPF    RBC Urine 0-2 0-2 /HPF /HPF    WBC Urine 0-5 0-5 /HPF /HPF    Narrative    Urine Culture not indicated       If you have any questions or concerns, please call the clinic at the number listed above.       Sincerely,      Igor Chandra MD

## 2021-11-16 NOTE — PROGRESS NOTES
S: Linwood Chi is a pleasant  89 year old male who was requested to be seen by  Anita Mendoza for a consult with regard to patient's urinary complaints.  Patient complains of nocturia x 3-4 recently.  Since then symptoms have improved and he only gets up 2 x at night.  He has no dysuria or hematuria.  He has no obstructive voiding symptoms.  His AUA Symptom Score:  10.  His QOL score:  1.  He has some post void dribble.    Current Outpatient Medications   Medication Sig Dispense Refill     acetaminophen (TYLENOL) 500 MG tablet Take 1-2 tablets (500-1,000 mg) by mouth every 8 hours as needed for mild pain ((no more than 3000 mg in 24 hours)) 60 tablet 1     amLODIPine (NORVASC) 5 MG tablet Take 2 tablets (10 mg) by mouth daily For  tablet 0     atorvastatin (LIPITOR) 40 MG tablet Take 1 tablet (40 mg) by mouth daily 90 tablet 1     clotrimazole-betamethasone (LOTRISONE) 1-0.05 % external cream (Apply to affected area for 7 -10 days) 45 g 0     dorzolamide-timolol (COSOPT) 2-0.5 % ophthalmic solution Place 1 drop Into the left eye 2 times daily  6     latanoprost (XALATAN) 0.005 % ophthalmic solution Place 1 drop Into the left eye daily  3     lidocaine (XYLOCAINE) 2 % external gel Apply topically 3 times daily 60 mL 1     lisinopril (ZESTRIL) 20 MG tablet TAKE 1 TABLET BY MOUTH DAILY 90 tablet 0     metFORMIN (GLUCOPHAGE) 500 MG tablet Taking  1/2 TABLET BY MOUTH TWICE A  tablet 1     metoprolol tartrate (LOPRESSOR) 25 MG tablet Take one a day 90 tablet 1     omeprazole (PRILOSEC) 20 MG DR capsule TAKE 1 CAPSULE BY MOUTH EVERY DAY 30 TO 60 MINUTES BEFORE A MEAL 90 capsule 2     warfarin ANTICOAGULANT (COUMADIN) 2.5 MG tablet TAKE 1 TABLET BY MOUTH DAILY OR AS DIRECTED BY INR NURSE 90 tablet 4     Allergies   Allergen Reactions     Crestor [Rosuvastatin] Cramps     Pcn [Penicillins]      Swelling      Past Medical History:   Diagnosis Date     CAD (coronary artery disease)      History of  tobacco use     Smoked x 50 years, Quit      Hyperlipidemia LDL goal < 100      Hypertension goal BP (blood pressure) < 130/80      Rectal bleeding 10/18/2011    [facility]mit-[facility].     Type 2 diabetes, HbA1c goal < 7% (H)      Past Surgical History:   Procedure Laterality Date     BYPASS GRAFT ARTERY CORONARY      3 vessel 2004     left eye cataract Left      OPEN REDUCTION INTERNAL FIXATION PATELLA        Family History   Problem Relation Age of Onset     Asthma Father      Diabetes Paternal Grandmother      Cerebrovascular Disease Brother      Hypertension Brother      Diabetes Sister      Psychotic Disorder Sister      He does not have a family history of prostate cancer.  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 Not Asked   Social History Narrative     None     Social Determinants of Health     Financial Resource Strain: Not on file   Food Insecurity: Not on file   Transportation Needs: Not on file   Physical Activity: Not on file   Stress: Not on file   Social Connections: Not on file   Intimate Partner Violence: Not on file   Housing Stability: Not on file        REVIEW OF SYSTEMS  =================  C: NEGATIVE for fever, chills, change in weight  I: NEGATIVE for worrisome rashes, moles or lesions  E/M: NEGATIVE for ear, mouth and throat problems  R: NEGATIVE for significant cough or SHORTNESS OF BREATH  CV:   NEGATIVE for chest pain, palpitations or peripheral edema  GI: NEGATIVE for nausea, abdominal pain, heartburn, or change in bowel habits  NEURO: NEGATIVE numbness/weakness  : see HPI  PSYCH: NEGATIVE depression/anxiety  LYmph: no new enlarged lymph nodes  Ortho: no new trauma/movements           O: Exam:BP (!) 167/84 (BP Location: Left arm, Patient Position: Chair, Cuff Size: Adult Large)   Pulse 69   Resp 16   SpO2 94%    Constitutional: healthy, alert and no distress  Cardiovascular: negative, PMI normal.   Respiratory: negative, no evidence of respiratory distress  Gastrointestinal: Abdomen soft, non-tender. BS normal. No masses, organomegaly  : penis no discharge. Testis no masses.  No scrotal skin lesion.  Prostate small.  PVR zero ml.  Musculoskeletal: extremities normal- no gross deformities noted, gait normal and normal muscle tone  Skin: no suspicious lesions or rashes  Neurologic: Alert and oriented  Psychiatric: mentation appears normal. and affect normal/bright  Hematologic/Lymphatic/Immunologic: normal ant/post cervical, axillary, supraclavicular and inguinal nodes    Assessment/Plan:   (R35.0) Frequency of micturition  Comment: symptoms improve  Plan: check UA today.  F/u if symptoms worsen    HTN: Linwood to follow up with Primary Care provider regarding elevated blood pressure.

## 2022-05-14 DIAGNOSIS — K21.9 GASTROESOPHAGEAL REFLUX DISEASE WITHOUT ESOPHAGITIS: ICD-10-CM

## 2022-05-16 NOTE — TELEPHONE ENCOUNTER
Prescription denied, patient now seeing provider with [facility] Downs, [place], [place]    [address] [place], [place] [address] [phone] (Work)    [phone] (Fax)    Saloni Green RN

## 2022-06-03 NOTE — PROGRESS NOTES
SUBJECTIVE:   Linwood Chi is a 87 year old male who presents to clinic today for the following health issues:      Diabetes Follow-up      Patient is checking blood sugars: rarely.      Diabetic concerns: None     Symptoms of hypoglycemia (low blood sugar): none     Paresthesias (numbness or burning in feet) or sores: Yes tingle one toe     Date of last diabetic eye exam: 01/5/16    Hyperlipidemia Follow-Up      Rate your low fat/cholesterol diet?: good    Taking statin?  Yes, no muscle aches from statin    Other lipid medications/supplements?:  none    Hypertension Follow-up      Outpatient blood pressures are not being checked.    Low Salt Diet: no added salt    BP Readings from Last 2 Encounters:   04/17/17 142/62   12/26/16 138/80     Hemoglobin A1C (%)   Date Value   12/26/2016 6.3 (H)   07/22/2016 6.3 (H)     LDL Cholesterol Calculated (mg/dL)   Date Value   12/26/2016 76   12/31/2015 71       Amount of exercise or physical activity: 2-3 days/week for an average of 15-30 minutes    Problems taking medications regularly: No    Medication side effects: none    Diet: low salt, low fat/cholesterol and diabetic             Problem list and histories reviewed & adjusted, as indicated.  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     DM (diabetes mellitus), type 2 with renal complications (H)     A-fib (H)     Osteoarthritis of left knee     Onychomycosis     Long-term (current) use of anticoagulants [Z79.01]     Atrial fibrillation (HCC) [I48.91]     Benign essential hypertension     Gastroesophageal reflux disease without esophagitis      Type 2 diabetes mellitus with diabetic nephropathy, without long-term current use of insulin (H)     Past Surgical History:   Procedure Laterality Date     BYPASS GRAFT ARTERY CORONARY  2004    3 vessel 2004     OPEN REDUCTION INTERNAL FIXATION PATELLA         Social History   Substance Use Topics     Smoking status: Former Smoker     Years: 50.00     Types: Cigarettes     Quit date: 3/4/1992     Smokeless tobacco: Former User     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     Family History   Problem Relation Age of Onset     Asthma Father      DIABETES Paternal Grandmother      CEREBROVASCULAR DISEASE Brother      Hypertension Brother      DIABETES Sister      Psychotic Disorder Sister          Current Outpatient Prescriptions   Medication Sig Dispense Refill     omeprazole (PRILOSEC) 20 MG CR capsule TAKE 1 CAPSULE BY MOUTH EVERY DAY 30 TO 60 MINUTES BEFORE A MEAL 90 capsule 4     lisinopril (PRINIVIL/ZESTRIL) 20 MG tablet TAKE 1 TABLET(20 MG) BY MOUTH DAILY 90 tablet 4     atorvastatin (LIPITOR) 40 MG tablet TAKE 1 TABLET(40 MG) BY MOUTH DAILY 90 tablet 4     metFORMIN (GLUCOPHAGE) 500 MG tablet TAKE 1 TABLET BY MOUTH TWICE DAILY WITH MEALS 180 tablet 4     amLODIPine (NORVASC) 5 MG tablet TAKE 1 TABLET(5 MG) BY MOUTH DAILY 90 tablet 4     metoprolol tartrate (LOPRESSOR) 25 MG tablet Take one a day 90 tablet 4     warfarin (COUMADIN) 5 MG tablet TAKE ONE-HALF TABLET BY MOUTH ON DAILY EXCEPT TAKE 1 TABLET DAILY ON MONDAYS 50 tablet 11     dorzolamide-timolol (COSOPT) 2-0.5 % ophthalmic solution Place 1 drop Into the left eye 2 times daily  6     latanoprost (XALATAN) 0.005 % ophthalmic solution Place 1 drop Into the left eye daily  3     order for DME Equipment being ordered: Mild compression hose 1 each 0     aspirin 81 MG tablet Take 1 tablet by mouth daily.  3     [DISCONTINUED] lisinopril (PRINIVIL/ZESTRIL) 20 MG tablet TAKE 1 TABLET(20 MG) BY MOUTH DAILY 30 tablet 0     [DISCONTINUED] atorvastatin  (LIPITOR) 40 MG tablet TAKE 1 TABLET(40 MG) BY MOUTH DAILY 90 tablet 0     [DISCONTINUED] metFORMIN (GLUCOPHAGE) 500 MG tablet TAKE 1 TABLET BY MOUTH TWICE DAILY WITH MEALS 180 tablet 0     [DISCONTINUED] amLODIPine (NORVASC) 5 MG tablet TAKE 1 TABLET(5 MG) BY MOUTH DAILY 90 tablet 0     [DISCONTINUED] metoprolol (LOPRESSOR) 25 MG tablet Take one a day 90 tablet 4     Allergies   Allergen Reactions     Crestor [Rosuvastatin] Cramps     Pcn [Bicillin C-R,]      Swelling      Recent Labs   Lab Test  02/12/18   1406  12/26/16   0929  07/22/16   1309  12/31/15   1356   08/31/15   1546  01/16/15   0943  01/15/15   1418  09/04/14   1428   A1C  6.3*  6.3*  6.3*   --    < >  6.1*   --   6.4*   --    LDL   --   76   --   71   --    --   73   --    --    HDL   --   49   --   41   --    --   40*   --    --    TRIG   --   135   --   116   --    --   169*   --    --    ALT   --   23   --    --    --   25   --    --   22   CR   --   1.51*  1.27*  1.17   --   1.17   --   1.31*  1.34*   GFRESTIMATED   --   44*  54*  59*   --   59*   --   52*  51*   GFRESTBLACK   --   53*  65  72   --   72   --   63  61   POTASSIUM   --   4.4  4.7  4.6   --   4.7   --   4.5  4.7   TSH   --    --   1.53   --    --    --    --   1.75   --     < > = values in this interval not displayed.      BP Readings from Last 3 Encounters:   02/12/18 132/70   04/17/17 142/62   12/26/16 138/80    Wt Readings from Last 3 Encounters:   02/12/18 191 lb (86.6 kg)   04/17/17 190 lb (86.2 kg)   12/26/16 189 lb 8 oz (86 kg)                  Labs reviewed in EPIC    Reviewed and updated as needed this visit by clinical staff       Reviewed and updated as needed this visit by Provider         ROS:  This 87 year old male is here today for diabetes check. He doesn't check his blood sugars but his weight stays stable and he eats about the same things every day.   C: NEGATIVE for fever, chills, change in weight  E/M: NEGATIVE for ear, mouth and throat problems  R: NEGATIVE for  "significant cough or SOB  CV: NEGATIVE for chest pain, palpitations or peripheral edema    OBJECTIVE:     /70  Pulse 67  Temp 97.8  F (36.6  C)  Resp 14  Ht 5' 6\" (1.676 m)  Wt 191 lb (86.6 kg)  SpO2 97%  BMI 30.83 kg/m2  Body mass index is 30.83 kg/(m^2).  GENERAL: healthy, alert and no distress  NECK: no adenopathy, no asymmetry, masses, or scars and thyroid normal to palpation  RESP: lungs clear to auscultation - no rales, rhonchi or wheezes  CV: regular rate and rhythm, normal S1 S2, no S3 or S4, no murmur, click or rub, no peripheral edema and peripheral pulses strong  ABDOMEN: soft, nontender, no hepatosplenomegaly, no masses and bowel sounds normal  MS: no gross musculoskeletal defects noted, no edema  Diabetic foot exam: normal DP and PT pulses, no trophic changes or ulcerative lesions, normal sensory exam and normal monofilament exam but feet are very dry   Well hydrated  Well nourished  Well groomed  Alert and oriented X 3  Good spirits  Very brisk gait with no shortness of breath   Has rapid speech    Diagnostic Test Results:  Results for orders placed or performed in visit on 02/12/18 (from the past 24 hour(s))   HEMOGLOBIN A1C   Result Value Ref Range    Hemoglobin A1C 6.3 (H) 4.3 - 6.0 %       ASSESSMENT/PLAN:              1. Type 2 diabetes mellitus with diabetic nephropathy, without long-term current use of insulin (H)  Good control   - FOOT EXAM  NO CHARGE [58553.114]  - HEMOGLOBIN A1C  - Lipid panel reflex to direct LDL Fasting  - Comprehensive metabolic panel  - metFORMIN (GLUCOPHAGE) 500 MG tablet; TAKE 1 TABLET BY MOUTH TWICE DAILY WITH MEALS  Dispense: 180 tablet; Refill: 4    2. CKD (chronic kidney disease) stage 3, GFR 30-59 ml/min  Good control   - Comprehensive metabolic panel  - lisinopril (PRINIVIL/ZESTRIL) 20 MG tablet; TAKE 1 TABLET(20 MG) BY MOUTH DAILY  Dispense: 90 tablet; Refill: 4  - amLODIPine (NORVASC) 5 MG tablet; TAKE 1 TABLET(5 MG) BY MOUTH DAILY  Dispense: 90 " tablet; Refill: 4    3. Benign essential hypertension  Good control   - Comprehensive metabolic panel  - atorvastatin (LIPITOR) 40 MG tablet; TAKE 1 TABLET(40 MG) BY MOUTH DAILY  Dispense: 90 tablet; Refill: 4  - metoprolol tartrate (LOPRESSOR) 25 MG tablet; Take one a day  Dispense: 90 tablet; Refill: 4    4. Hyperlipidemia with target LDL less than 100  Good control, he is only fasting 4 hours today   - Lipid panel reflex to direct LDL Fasting  - atorvastatin (LIPITOR) 40 MG tablet; TAKE 1 TABLET(40 MG) BY MOUTH DAILY  Dispense: 90 tablet; Refill: 4    5. Gastroesophageal reflux disease without esophagitis  Good control   - omeprazole (PRILOSEC) 20 MG CR capsule; TAKE 1 CAPSULE BY MOUTH EVERY DAY 30 TO 60 MINUTES BEFORE A MEAL  Dispense: 90 capsule; Refill: 4    6. Screening for diabetic peripheral neuropathy  due  - FOOT EXAM  NO CHARGE [56065.114]    7. Dyshidrosis  Encouraged lotion on his feet     8. At risk for falling  At risk as he walks fast      Return to clinic 3 months    LORENZO DONAHUE MD  [facility]     5